# Patient Record
Sex: MALE | Race: WHITE | HISPANIC OR LATINO | Employment: OTHER | URBAN - METROPOLITAN AREA
[De-identification: names, ages, dates, MRNs, and addresses within clinical notes are randomized per-mention and may not be internally consistent; named-entity substitution may affect disease eponyms.]

---

## 2018-08-09 ENCOUNTER — HOSPITAL ENCOUNTER (EMERGENCY)
Facility: CLINIC | Age: 83
Discharge: HOME | End: 2018-08-09
Attending: EMERGENCY MEDICINE | Admitting: EMERGENCY MEDICINE
Payer: MEDICAID

## 2018-08-09 ENCOUNTER — APPOINTMENT (OUTPATIENT)
Dept: ULTRASOUND IMAGING | Facility: CLINIC | Age: 83
End: 2018-08-09
Attending: EMERGENCY MEDICINE
Payer: MEDICAID

## 2018-08-09 ENCOUNTER — APPOINTMENT (OUTPATIENT)
Dept: GENERAL RADIOLOGY | Facility: CLINIC | Age: 83
End: 2018-08-09
Attending: EMERGENCY MEDICINE
Payer: MEDICAID

## 2018-08-09 VITALS
RESPIRATION RATE: 18 BRPM | TEMPERATURE: 98.9 F | SYSTOLIC BLOOD PRESSURE: 119 MMHG | OXYGEN SATURATION: 97 % | DIASTOLIC BLOOD PRESSURE: 65 MMHG

## 2018-08-09 DIAGNOSIS — I50.9 CHRONIC CONGESTIVE HEART FAILURE, UNSPECIFIED CONGESTIVE HEART FAILURE TYPE: ICD-10-CM

## 2018-08-09 DIAGNOSIS — R79.1 SUBTHERAPEUTIC INTERNATIONAL NORMALIZED RATIO (INR): ICD-10-CM

## 2018-08-09 DIAGNOSIS — R60.0 PERIPHERAL EDEMA: ICD-10-CM

## 2018-08-09 LAB
ALBUMIN SERPL-MCNC: 2.6 G/DL (ref 3.4–5)
ALP SERPL-CCNC: 114 U/L (ref 40–150)
ALT SERPL W P-5'-P-CCNC: 20 U/L (ref 0–70)
ANION GAP SERPL CALCULATED.3IONS-SCNC: 5 MMOL/L (ref 3–14)
AST SERPL W P-5'-P-CCNC: 13 U/L (ref 0–45)
BASOPHILS # BLD AUTO: 0 10E9/L (ref 0–0.2)
BASOPHILS NFR BLD AUTO: 0.3 %
BILIRUB SERPL-MCNC: 0.1 MG/DL (ref 0.2–1.3)
BUN SERPL-MCNC: 13 MG/DL (ref 7–30)
CALCIUM SERPL-MCNC: 8 MG/DL (ref 8.5–10.1)
CHLORIDE SERPL-SCNC: 103 MMOL/L (ref 94–109)
CO2 SERPL-SCNC: 30 MMOL/L (ref 20–32)
CREAT SERPL-MCNC: 0.74 MG/DL (ref 0.66–1.25)
DIFFERENTIAL METHOD BLD: ABNORMAL
EOSINOPHIL # BLD AUTO: 0.1 10E9/L (ref 0–0.7)
EOSINOPHIL NFR BLD AUTO: 1.7 %
ERYTHROCYTE [DISTWIDTH] IN BLOOD BY AUTOMATED COUNT: 13.9 % (ref 10–15)
GFR SERPL CREATININE-BSD FRML MDRD: >90 ML/MIN/1.7M2
GLUCOSE SERPL-MCNC: 85 MG/DL (ref 70–99)
HCT VFR BLD AUTO: 32.5 % (ref 40–53)
HGB BLD-MCNC: 10.2 G/DL (ref 13.3–17.7)
IMM GRANULOCYTES # BLD: 0.1 10E9/L (ref 0–0.4)
IMM GRANULOCYTES NFR BLD: 1.8 %
INR PPP: 1.57 (ref 0.86–1.14)
LYMPHOCYTES # BLD AUTO: 1.5 10E9/L (ref 0.8–5.3)
LYMPHOCYTES NFR BLD AUTO: 18.8 %
MCH RBC QN AUTO: 30.2 PG (ref 26.5–33)
MCHC RBC AUTO-ENTMCNC: 31.4 G/DL (ref 31.5–36.5)
MCV RBC AUTO: 96 FL (ref 78–100)
MONOCYTES # BLD AUTO: 1.1 10E9/L (ref 0–1.3)
MONOCYTES NFR BLD AUTO: 14.3 %
NEUTROPHILS # BLD AUTO: 4.9 10E9/L (ref 1.6–8.3)
NEUTROPHILS NFR BLD AUTO: 63.1 %
NRBC # BLD AUTO: 0 10*3/UL
NRBC BLD AUTO-RTO: 0 /100
NT-PROBNP SERPL-MCNC: 1878 PG/ML (ref 0–1800)
PLATELET # BLD AUTO: 343 10E9/L (ref 150–450)
POTASSIUM SERPL-SCNC: 3.5 MMOL/L (ref 3.4–5.3)
PROT SERPL-MCNC: 6 G/DL (ref 6.8–8.8)
RBC # BLD AUTO: 3.38 10E12/L (ref 4.4–5.9)
SODIUM SERPL-SCNC: 138 MMOL/L (ref 133–144)
WBC # BLD AUTO: 7.8 10E9/L (ref 4–11)

## 2018-08-09 PROCEDURE — 99285 EMERGENCY DEPT VISIT HI MDM: CPT | Mod: 25

## 2018-08-09 PROCEDURE — 83880 ASSAY OF NATRIURETIC PEPTIDE: CPT | Performed by: EMERGENCY MEDICINE

## 2018-08-09 PROCEDURE — 96374 THER/PROPH/DIAG INJ IV PUSH: CPT

## 2018-08-09 PROCEDURE — 85025 COMPLETE CBC W/AUTO DIFF WBC: CPT | Performed by: EMERGENCY MEDICINE

## 2018-08-09 PROCEDURE — 93005 ELECTROCARDIOGRAM TRACING: CPT

## 2018-08-09 PROCEDURE — 71046 X-RAY EXAM CHEST 2 VIEWS: CPT

## 2018-08-09 PROCEDURE — 94640 AIRWAY INHALATION TREATMENT: CPT

## 2018-08-09 PROCEDURE — 93970 EXTREMITY STUDY: CPT

## 2018-08-09 PROCEDURE — 85610 PROTHROMBIN TIME: CPT | Performed by: EMERGENCY MEDICINE

## 2018-08-09 PROCEDURE — 25000128 H RX IP 250 OP 636: Performed by: EMERGENCY MEDICINE

## 2018-08-09 PROCEDURE — 80053 COMPREHEN METABOLIC PANEL: CPT | Performed by: EMERGENCY MEDICINE

## 2018-08-09 PROCEDURE — 25000125 ZZHC RX 250: Performed by: EMERGENCY MEDICINE

## 2018-08-09 RX ORDER — IPRATROPIUM BROMIDE AND ALBUTEROL SULFATE 2.5; .5 MG/3ML; MG/3ML
3 SOLUTION RESPIRATORY (INHALATION) ONCE
Status: COMPLETED | OUTPATIENT
Start: 2018-08-09 | End: 2018-08-09

## 2018-08-09 RX ORDER — FUROSEMIDE 10 MG/ML
40 INJECTION INTRAMUSCULAR; INTRAVENOUS ONCE
Status: COMPLETED | OUTPATIENT
Start: 2018-08-09 | End: 2018-08-09

## 2018-08-09 RX ADMIN — FUROSEMIDE 40 MG: 10 INJECTION, SOLUTION INTRAVENOUS at 20:22

## 2018-08-09 RX ADMIN — IPRATROPIUM BROMIDE AND ALBUTEROL SULFATE 3 ML: .5; 3 SOLUTION RESPIRATORY (INHALATION) at 18:03

## 2018-08-09 NOTE — ED AVS SNAPSHOT
[facility] Emergency Department    [address]    [facility] [address]    Phone:  [phone]    Fax:  [phone]                                       Thierry Love   MRN: [id number]    Department:  [facility] Emergency Department   Date of Visit:  8/9/2018           After Visit Summary Signature Page     I have received my discharge instructions, and my questions have been answered. I have discussed any challenges I see with this plan with the nurse or doctor.    ..........................................................................................................................................  Patient/Patient Representative Signature      ..........................................................................................................................................  Patient Representative Print Name and Relationship to Patient    ..................................................               ................................................  Date                                            Time    ..........................................................................................................................................  Reviewed by Signature/Title    ...................................................              ..............................................  Date                                                            Time

## 2018-08-09 NOTE — ED PROVIDER NOTES
History     Chief Complaint:  Fall and Leg Swelling      HPI   Thierry Love is a pleasant 83 year old male  with a history of CHF, COPD with a pacemaker, on Warfarin who presents to the emergency department with his daughter for evaluation of leg swelling. The patient came in [facility] where he lives exactly 1 week ago and has been complaining of right sided leg pain and swelling since the flight to [place]. The patient does have swollen legs at baseline but reports that this is worse than normal. He also notes pain in the right leg at the calf which is intermittent, non-radiating and aggravated by touch. The patient denies known history of blood clot. He takes warfarin daily. He denies any dyspnea or chest pain. He notes that he checks INR every month.     Allergies:  No Known Allergies     Medications:    Losartan  Atorvastatin  KCl  Furosemide  Warfarin  Omeprazole  Albuterol   Microzide  Levaquin     Past Medical History:    COPD  Hypertension     Past Surgical History:    Appendectomy  Cardiac surgery    Family History:    No family history on file.    Social History:  The patient  reports that he has quit smoking. He has never used smokeless tobacco. He reports that he drinks alcohol. He reports that he does not use illicit drugs.   Marital Status:   [2]     Review of Systems   HENT: Negative.    Cardiovascular: Positive for leg swelling.   All other systems reviewed and are negative.    Physical Exam   First Vitals:  BP: 119/68  Heart Rate: 70  Temp: 98.9  F (37.2  C)  Resp: 18  SpO2: 96 %      Physical Exam    General:   Pleasant, age appropriate male resting comfortably.  HEENT:    Oropharynx is moist, without lesions or trismus.  Eyes:    Conjunctiva normal  Neck:    Supple, no meningismus.     CV:     Regular rate and rhythm.      No murmurs, rubs or gallops.       No unilateral leg swelling.       2+ radial pulses bilateral.       2-3+ bilateral lower extremity edema.  PULM:    Trace late  expiratory wheezing     No respiratory distress.      Good air exchange.     No rales     No stridor.  ABD:    Soft, non-tender, non-distended.       No pulsatile masses.       No rebound, guarding or rigidity.  MSK:     No gross deformity to all four extremities.   LYMPH:   No cervical lymphadenopathy.  NEURO:   Alert. Good muscle tone, no atrophy.  Skin:    Warm, dry     Superficial ulceration to the right lower leg and left anterior knee  Psych:    Mood is good and affect is appropriate.        Emergency Department Course   ECG (17:56:01):  Rate 69 bpm. NJ interval *. QRS duration 134. QT/QTc 418/447. P-R-T axes * -47 6. Atrial fibrillation. Left axis deviation. Right bundle branch block. Abnormal EKG. Interpreted at 1758 by Juni Pulido MD.       Imaging:  Chest XR,  PA & LAT   Preliminary Result   IMPRESSION: A single lead pacemaker module implanted over the right   chest with the lead in the right ventricle is again noted without   identifiable change.      Severe cardiomegaly again noted. The lungs are clear. There is no   pleural effusion or pneumothorax. There is no evidence for pulmonary   edema. No evidence for rib fracture on these two views.      US Lower Extremity Venous Duplex Bilateral   Final Result   IMPRESSION: No DVT demonstrated.      YAMEL CUADRA MD             Laboratory:  CBC: RBC 3.38L, HGB 10.2L, *HCT 32.5L, otherwise within normal limits   CMP: Calcium 8.0L, Bilirubin Total 0.1L, Albumin 2.6L, Protein Total 6.0L, otherwise within normal limits  BNP: 1878H  INR: 1.57h    Interventions:  1803: Duo neb 3mL.  2022: Lasix 40mg IV    Emergency Department Course:  Past medical records, nursing notes, and vitals reviewed.  1722: I performed an exam of the patient and obtained history, as documented above.       IV inserted and blood drawn for the above work up to be conducted.     The patient was sent for imaging studies while in the emergency department, findings above.    Rechecked the  patient, findings and plan explained to the patient. Patient discharged home, status improved, with instructions regarding supportive care, medications, and reasons to return as well as the importance of close follow-up was reviewed.    Impression & Plan    Medical Decision Makin-year-old male with history of CHF presented to the ED with his daughter for progressively worsening bilateral lower extremity edema.  He has no evidence of developing liver or renal failure.  Doppler ultrasound done to rule out DVT.  He does have a history of CHF in which he has no evidence of pulmonary edema on chest x-ray.  Peripheral edema is clearly related to his CHF and possible some degree of lymphedema.  Patient is currently on make maintained on 40 mg of Lasix twice a day.  Patient was given additional additional dose of IV Lasix.  He will take 80 mg of Lasix the next 2 mornings and 40 mg at night.  He will be instructed to closely follow-up his primary care physician in the next 2-4 days for recheck of his peripheral edema as well as renal function and electrolytes.  He was incidentally noted to have a subtherapeutic INR.  Patient will take 5 mg of warfarin over the next 3 days then may resume his normal schedule.  Again close follow-up PCP for INR recheck.  Patient safe for discharge home.    Critical Care time:  none    Diagnosis:    ICD-10-CM    1. Chronic congestive heart failure, unspecified congestive heart failure type (H) I50.9    2. Peripheral edema R60.9    3. Subtherapeutic international normalized ratio (INR) R79.1        Disposition:  discharged to home    Jennifer SUN am serving as a scribe at 5:22 PM on 2018 to document services personally performed by Juni Pulido MD based on my observations and the provider's statements to me.    [facility] EMERGENCY DEPARTMENT       Juni Pulido MD  [phone]

## 2018-08-09 NOTE — ED AVS SNAPSHOT
[facility] Emergency Department    [address] [place] [address]    Phone:  [phone]    Fax:  [phone]                                       Thierry Love   MRN: [id number]    Department:  [facility] Emergency Department   Date of Visit:  8/9/2018           Patient Information     Date Of Birth          1935        Your diagnoses for this visit were:     Chronic congestive heart failure, unspecified congestive heart failure type (H)     Peripheral edema     Subtherapeutic international normalized ratio (INR)        You were seen by Juni Pulido MD.        Discharge Instructions       Please take an extra dose of furosemide the next 2 mornings.  You will take 2 tablets of the furosemide on Friday and Saturday morning and continue to take 1 tablet at night then resume your normal schedule.    Your INR was low today at 1.57.  Please take the full dose of 5 mg for the next 3 days.    Please make an appointment to follow up with [facility] [phone] in 2-4 days even if entirely better.        Discharge References/Attachments     HEART FAILURE, WHAT IS (Frisian)    LEG SWELLING IN BOTH LEGS (Frisian)      24 Hour Appointment Hotline       To make an appointment at any [facility], call [phone] ([phone]). If you don't have a family doctor or clinic, we will help you find one. [facility] are conveniently located to serve the needs of you and your family.             Review of your medicines      Our records show that you are taking the medicines listed below. If these are incorrect, please call your family doctor or clinic.        Dose / Directions Last dose taken    albuterol 108 (90 Base) MCG/ACT Inhaler   Commonly known as:  PROAIR HFA/PROVENTIL HFA/VENTOLIN HFA   Dose:  2 puff   Quantity:  1 Inhaler        Inhale 2 puffs into the lungs every 6 hours as needed for shortness of breath / dyspnea   Refills:  0         ALBUTEROL IN   Dose:  2 puff        Inhale 2 puffs into the lungs every 6 hours as needed   Refills:  0        ENALAPRIL MALEATE PO   Dose:  10 mg        Take 10 mg by mouth   Refills:  0        FLUTICASONE-SALMETEROL IN        Refills:  0        FUROSEMIDE PO   Dose:  40 mg        Take 40 mg by mouth   Refills:  0        hydrochlorothiazide 12.5 MG capsule   Commonly known as:  MICROZIDE   Dose:  12.5 mg        Take 12.5 mg by mouth daily   Refills:  0        levofloxacin 750 MG tablet   Commonly known as:  LEVAQUIN   Dose:  750 mg   Quantity:  6 tablet        Take 1 tablet (750 mg) by mouth daily   Refills:  0        LIPITOR PO   Dose:  10 mg        Take 10 mg by mouth   Refills:  0        OMEPRAZOLE PO   Dose:  20 mg        Take 20 mg by mouth   Refills:  0        tiotropium 18 MCG capsule   Commonly known as:  SPIRIVA   Dose:  18 mcg        Inhale 18 mcg into the lungs daily   Refills:  0        WARFARIN SODIUM   Dose:  5 mg        5 mg daily   Refills:  0                Procedures and tests performed during your visit     BNP    CBC + differential    Cardiac Continuous Monitoring    Chest XR,  PA & LAT    Comprehensive metabolic panel    EKG 12 lead    INR    Peripheral IV catheter    US Lower Extremity Venous Duplex Bilateral      Orders Needing Specimen Collection     None      Pending Results     Date and Time Order Name Status Description    8/9/2018 1732 Chest XR,  PA & LAT Preliminary     8/9/2018 1732 EKG 12 lead Preliminary             Pending Culture Results     No orders found from 8/7/2018 to 8/10/2018.            Pending Results Instructions     If you had any lab results that were not finalized at the time of your Discharge, you can call the ED Lab Result RN at [phone]. You will be contacted by this team for any positive Lab results or changes in treatment. The nurses are available 7 days a week from 10A to 6:30P.  You can leave a message 24 hours per day and they will return your call.         Test Results From Your Hospital Stay        8/9/2018  6:12 PM      Component Results     Component Value Ref Range & Units Status    WBC 7.8 4.0 - 11.0 10e9/L Final    RBC Count 3.38 (L) 4.4 - 5.9 10e12/L Final    Hemoglobin 10.2 (L) 13.3 - 17.7 g/dL Final    Hematocrit 32.5 (L) 40.0 - 53.0 % Final    MCV 96 78 - 100 fl Final    MCH 30.2 26.5 - 33.0 pg Final    MCHC 31.4 (L) 31.5 - 36.5 g/dL Final    RDW 13.9 10.0 - 15.0 % Final    Platelet Count 343 150 - 450 10e9/L Final    Diff Method Automated Method  Final    % Neutrophils 63.1 % Final    % Lymphocytes 18.8 % Final    % Monocytes 14.3 % Final    % Eosinophils 1.7 % Final    % Basophils 0.3 % Final    % Immature Granulocytes 1.8 % Final    Nucleated RBCs 0 0 /100 Final    Absolute Neutrophil 4.9 1.6 - 8.3 10e9/L Final    Absolute Lymphocytes 1.5 0.8 - 5.3 10e9/L Final    Absolute Monocytes 1.1 0.0 - 1.3 10e9/L Final    Absolute Eosinophils 0.1 0.0 - 0.7 10e9/L Final    Absolute Basophils 0.0 0.0 - 0.2 10e9/L Final    Abs Immature Granulocytes 0.1 0 - 0.4 10e9/L Final    Absolute Nucleated RBC 0.0  Final         8/9/2018  6:30 PM      Component Results     Component Value Ref Range & Units Status    Sodium 138 133 - 144 mmol/L Final    Potassium 3.5 3.4 - 5.3 mmol/L Final    Chloride 103 94 - 109 mmol/L Final    Carbon Dioxide 30 20 - 32 mmol/L Final    Anion Gap 5 3 - 14 mmol/L Final    Glucose 85 70 - 99 mg/dL Final    Urea Nitrogen 13 7 - 30 mg/dL Final    Creatinine 0.74 0.66 - 1.25 mg/dL Final    GFR Estimate >90 >60 mL/min/1.7m2 Final    Non  GFR Calc    GFR Estimate If Black >90 >60 mL/min/1.7m2 Final    African American GFR Calc    Calcium 8.0 (L) 8.5 - 10.1 mg/dL Final    Bilirubin Total 0.1 (L) 0.2 - 1.3 mg/dL Final    Albumin 2.6 (L) 3.4 - 5.0 g/dL Final    Protein Total 6.0 (L) 6.8 - 8.8 g/dL Final    Alkaline Phosphatase 114 40 - 150 U/L Final    ALT 20 0 - 70 U/L Final    AST 13 0 - 45 U/L Final         8/9/2018  6:30 PM       Component Results     Component Value Ref Range & Units Status    N-Terminal Pro BNP Inpatient 1878 (H) 0 - 1800 pg/mL Final       Reference range shown and results flagged as abnormal are suggested inpatient   cut points for confirming diagnosis if CHF in an acute setting. Establishing a   baseline value for each individual patient is useful for follow-up. An   inpatient or emergency department NT-proPBNP <300 pg/mL effectively rules out   acute CHF, with 99% negative predictive value.  The outpatient non-acute reference range for ruling out CHF is:   0-125 pg/mL (age 18 to less than 75)   0-450 pg/mL (age 75 yrs and older)           8/9/2018  6:29 PM      Component Results     Component Value Ref Range & Units Status    INR 1.57 (H) 0.86 - 1.14 Final         8/9/2018  7:47 PM      Narrative     CHEST TWO VIEWS  8/9/2018 7:08 PM     COMPARISON: Two view chest x-ray 8/15/2013    HISTORY: Fall, left rib pain; dyspnea with history of congestive heart  failure.         Impression     IMPRESSION: A single lead pacemaker module implanted over the right  chest with the lead in the right ventricle is again noted without  identifiable change.    Severe cardiomegaly again noted. The lungs are clear. There is no  pleural effusion or pneumothorax. There is no evidence for pulmonary  edema. No evidence for rib fracture on these two views.         8/9/2018  7:10 PM      Narrative     ULTRASOUND VENOUS LOWER EXTREMITY BILATERAL 8/9/2018 7:05 PM     HISTORY: bilateral leg swelling, bilateral leg swelling;     COMPARISON: None.    TECHNIQUE: Ultrasound gray scale, Color Doppler flow, and spectral  Doppler waveform analysis performed.    FINDINGS: The bilateral common femoral, superficial femoral, popliteal  and posterior tibial veins are patent and fully compressible and  demonstrate normal venous Doppler flow.        Impression     IMPRESSION: No DVT demonstrated.    YAMEL CUADRA MD                Clinical Quality Measure: Blood  Pressure Screening     Your blood pressure was checked while you were in the emergency department today. The last reading we obtained was  BP: 114/59 . Please read the guidelines below about what these numbers mean and what you should do about them.  If your systolic blood pressure (the top number) is less than 120 and your diastolic blood pressure (the bottom number) is less than 80, then your blood pressure is normal. There is nothing more that you need to do about it.  If your systolic blood pressure (the top number) is 120-139 or your diastolic blood pressure (the bottom number) is 80-89, your blood pressure may be higher than it should be. You should have your blood pressure rechecked within a year by a primary care provider.  If your systolic blood pressure (the top number) is 140 or greater or your diastolic blood pressure (the bottom number) is 90 or greater, you may have high blood pressure. High blood pressure is treatable, but if left untreated over time it can put you at risk for heart attack, stroke, or kidney failure. You should have your blood pressure rechecked by a primary care provider within the next 4 weeks.  If your provider in the emergency department today gave you specific instructions to follow-up with your doctor or provider even sooner than that, you should follow that instruction and not wait for up to 4 weeks for your follow-up visit.        Thank you for choosing [facility]       Thank you for choosing [facility] for your care. Our goal is always to provide you with excellent care. Hearing back from our patients is one way we can continue to improve our services. Please take a few minutes to complete the written survey that you may receive in the mail after you visit with us. Thank you!        [employer] Information     [employer] lets you send messages to your doctor, view your test results, renew your prescriptions, schedule appointments and more. To sign up, go to [email or web address]  "Click on \"Log in\" on the left side of the screen, which will take you to the Welcome page. Then click on \"Sign up Now\" on the right side of the page.     You will be asked to enter the access code listed below, as well as some personal information. Please follow the directions to create your username and password.     Your access code is: [email or web address]  Expires: 2018  8:52 PM     Your access code will  in 90 days. If you need help or a new code, please call your [facility] clinic or [phone].        Care EveryWhere ID     This is your Care EveryWhere ID. This could be used by other organizations to access your [facility] medical records  [id number]        Equal Access to Services     JERRY HAWKINS : Bang Dupree, wajusta herrera, gary kaalpj heaton, shabbir kruger. So [facility] [place] [facility] [phone].    ATENCIÓN: Si habla español, tiene a garner disposición servicios gratuitos de asistencia lingüística. Llame al [phone].    We comply with applicable federal civil rights laws and [place] laws. We do not discriminate on the basis of race, color, national origin, age, disability, sex, sexual orientation, or gender identity.            After Visit Summary       This is your record. Keep this with you and show to your community pharmacist(s) and doctor(s) at your next visit.                  "

## 2018-08-10 LAB — INTERPRETATION ECG - MUSE: NORMAL

## 2018-08-10 NOTE — DISCHARGE INSTRUCTIONS
Please take an extra dose of furosemide the next 2 mornings.  You will take 2 tablets of the furosemide on Friday and Saturday morning and continue to take 1 tablet at night then resume your normal schedule.    Your INR was low today at 1.57.  Please take the full dose of 5 mg for the next 3 days.    Please make an appointment to follow up with [facility] [phone] in 2-4 days even if entirely better.

## 2018-08-10 NOTE — ED NOTES
Dr. Pulido requested AMBROCIO hose. However, the daughter is concerned about this plan as the patient does not tolerate even normal socks and especially removal of socks is very uncomfortable. Discussed this with Dr. Pulido who indicates to not do the TEDS because of this. Open wounds on the legs to be dressed by ERT.

## 2018-09-25 ENCOUNTER — AMBULATORY - HEALTHEAST (OUTPATIENT)
Dept: VASCULAR SURGERY | Facility: CLINIC | Age: 83
End: 2018-09-25

## 2018-09-25 DIAGNOSIS — I87.2 DERMATITIS, STASIS: ICD-10-CM

## 2018-09-27 ENCOUNTER — HOSPITAL ENCOUNTER (INPATIENT)
Facility: CLINIC | Age: 83
LOS: 3 days | Discharge: HOME | End: 2018-09-30
Attending: EMERGENCY MEDICINE | Admitting: INTERNAL MEDICINE
Payer: MEDICAID

## 2018-09-27 ENCOUNTER — APPOINTMENT (OUTPATIENT)
Dept: GENERAL RADIOLOGY | Facility: CLINIC | Age: 83
End: 2018-09-27
Attending: EMERGENCY MEDICINE
Payer: MEDICAID

## 2018-09-27 DIAGNOSIS — M62.81 GENERALIZED MUSCLE WEAKNESS: ICD-10-CM

## 2018-09-27 DIAGNOSIS — J18.9 PNEUMONIA OF BOTH LOWER LOBES DUE TO INFECTIOUS ORGANISM: ICD-10-CM

## 2018-09-27 DIAGNOSIS — Z78.9 FAILURE OF OUTPATIENT TREATMENT: ICD-10-CM

## 2018-09-27 DIAGNOSIS — J44.1 COPD EXACERBATION (H): ICD-10-CM

## 2018-09-27 DIAGNOSIS — I50.33 ACUTE ON CHRONIC DIASTOLIC CONGESTIVE HEART FAILURE (H): Primary | ICD-10-CM

## 2018-09-27 DIAGNOSIS — R06.02 SOB (SHORTNESS OF BREATH): ICD-10-CM

## 2018-09-27 DIAGNOSIS — R05.9 COUGH: ICD-10-CM

## 2018-09-27 DIAGNOSIS — H04.123 DRY EYES: ICD-10-CM

## 2018-09-27 LAB
ANION GAP SERPL CALCULATED.3IONS-SCNC: 6 MMOL/L (ref 3–14)
BASOPHILS # BLD AUTO: 0 10E9/L (ref 0–0.2)
BASOPHILS NFR BLD AUTO: 0.3 %
BUN SERPL-MCNC: 14 MG/DL (ref 7–30)
CALCIUM SERPL-MCNC: 8 MG/DL (ref 8.5–10.1)
CHLORIDE SERPL-SCNC: 106 MMOL/L (ref 94–109)
CO2 SERPL-SCNC: 26 MMOL/L (ref 20–32)
CREAT SERPL-MCNC: 0.76 MG/DL (ref 0.66–1.25)
DIFFERENTIAL METHOD BLD: ABNORMAL
EOSINOPHIL # BLD AUTO: 0.3 10E9/L (ref 0–0.7)
EOSINOPHIL NFR BLD AUTO: 4.7 %
ERYTHROCYTE [DISTWIDTH] IN BLOOD BY AUTOMATED COUNT: 14.2 % (ref 10–15)
GFR SERPL CREATININE-BSD FRML MDRD: >90 ML/MIN/1.7M2
GLUCOSE SERPL-MCNC: 131 MG/DL (ref 70–99)
HCT VFR BLD AUTO: 37.2 % (ref 40–53)
HGB BLD-MCNC: 11.5 G/DL (ref 13.3–17.7)
IMM GRANULOCYTES # BLD: 0 10E9/L (ref 0–0.4)
IMM GRANULOCYTES NFR BLD: 0.1 %
INR PPP: 2.07 (ref 0.86–1.14)
LYMPHOCYTES # BLD AUTO: 0.9 10E9/L (ref 0.8–5.3)
LYMPHOCYTES NFR BLD AUTO: 13.1 %
MCH RBC QN AUTO: 29.6 PG (ref 26.5–33)
MCHC RBC AUTO-ENTMCNC: 30.9 G/DL (ref 31.5–36.5)
MCV RBC AUTO: 96 FL (ref 78–100)
MONOCYTES # BLD AUTO: 1 10E9/L (ref 0–1.3)
MONOCYTES NFR BLD AUTO: 13.8 %
NEUTROPHILS # BLD AUTO: 4.9 10E9/L (ref 1.6–8.3)
NEUTROPHILS NFR BLD AUTO: 68 %
NRBC # BLD AUTO: 0 10*3/UL
NRBC BLD AUTO-RTO: 0 /100
NT-PROBNP SERPL-MCNC: 1421 PG/ML (ref 0–1800)
PLATELET # BLD AUTO: 230 10E9/L (ref 150–450)
POTASSIUM SERPL-SCNC: 4.3 MMOL/L (ref 3.4–5.3)
RBC # BLD AUTO: 3.89 10E12/L (ref 4.4–5.9)
SODIUM SERPL-SCNC: 138 MMOL/L (ref 133–144)
TROPONIN I SERPL-MCNC: <0.015 UG/L (ref 0–0.04)
WBC # BLD AUTO: 7.2 10E9/L (ref 4–11)

## 2018-09-27 PROCEDURE — 94640 AIRWAY INHALATION TREATMENT: CPT | Mod: 76

## 2018-09-27 PROCEDURE — 85610 PROTHROMBIN TIME: CPT | Performed by: EMERGENCY MEDICINE

## 2018-09-27 PROCEDURE — 99223 1ST HOSP IP/OBS HIGH 75: CPT | Mod: AI | Performed by: INTERNAL MEDICINE

## 2018-09-27 PROCEDURE — 96365 THER/PROPH/DIAG IV INF INIT: CPT

## 2018-09-27 PROCEDURE — 25000128 H RX IP 250 OP 636: Performed by: INTERNAL MEDICINE

## 2018-09-27 PROCEDURE — 40000275 ZZH STATISTIC RCP TIME EA 10 MIN

## 2018-09-27 PROCEDURE — 25000128 H RX IP 250 OP 636: Performed by: EMERGENCY MEDICINE

## 2018-09-27 PROCEDURE — 93005 ELECTROCARDIOGRAM TRACING: CPT

## 2018-09-27 PROCEDURE — 12000007 ZZH R&B INTERMEDIATE

## 2018-09-27 PROCEDURE — 25000125 ZZHC RX 250: Performed by: EMERGENCY MEDICINE

## 2018-09-27 PROCEDURE — 25000132 ZZH RX MED GY IP 250 OP 250 PS 637: Performed by: INTERNAL MEDICINE

## 2018-09-27 PROCEDURE — 85025 COMPLETE CBC W/AUTO DIFF WBC: CPT | Performed by: EMERGENCY MEDICINE

## 2018-09-27 PROCEDURE — 94640 AIRWAY INHALATION TREATMENT: CPT

## 2018-09-27 PROCEDURE — 25000125 ZZHC RX 250: Performed by: INTERNAL MEDICINE

## 2018-09-27 PROCEDURE — 84484 ASSAY OF TROPONIN QUANT: CPT | Performed by: EMERGENCY MEDICINE

## 2018-09-27 PROCEDURE — 99285 EMERGENCY DEPT VISIT HI MDM: CPT | Mod: 25

## 2018-09-27 PROCEDURE — 40000274 ZZH STATISTIC RCP CONSULT EA 30 MIN

## 2018-09-27 PROCEDURE — 83880 ASSAY OF NATRIURETIC PEPTIDE: CPT | Performed by: EMERGENCY MEDICINE

## 2018-09-27 PROCEDURE — 71046 X-RAY EXAM CHEST 2 VIEWS: CPT

## 2018-09-27 PROCEDURE — 80048 BASIC METABOLIC PNL TOTAL CA: CPT | Performed by: EMERGENCY MEDICINE

## 2018-09-27 PROCEDURE — 99207 ZZC CDG-MDM COMPONENT: MEETS MODERATE - UP CODED: CPT | Performed by: INTERNAL MEDICINE

## 2018-09-27 RX ORDER — AZITHROMYCIN 250 MG/1
TABLET, FILM COATED ORAL
Status: ON HOLD | COMMUNITY
Start: 2018-09-26 | End: 2018-09-30

## 2018-09-27 RX ORDER — ACETAMINOPHEN 325 MG/1
650 TABLET ORAL EVERY 4 HOURS PRN
Status: DISCONTINUED | OUTPATIENT
Start: 2018-09-27 | End: 2018-09-30 | Stop reason: HOSPADM

## 2018-09-27 RX ORDER — IPRATROPIUM BROMIDE AND ALBUTEROL SULFATE 2.5; .5 MG/3ML; MG/3ML
3 SOLUTION RESPIRATORY (INHALATION)
Status: DISCONTINUED | OUTPATIENT
Start: 2018-09-27 | End: 2018-09-28

## 2018-09-27 RX ORDER — DOCUSATE SODIUM 100 MG/1
100 CAPSULE, LIQUID FILLED ORAL 2 TIMES DAILY PRN
Status: DISCONTINUED | OUTPATIENT
Start: 2018-09-27 | End: 2018-09-30 | Stop reason: HOSPADM

## 2018-09-27 RX ORDER — WARFARIN SODIUM 5 MG/1
5 TABLET ORAL
Status: ON HOLD | COMMUNITY
End: 2021-05-26

## 2018-09-27 RX ORDER — POTASSIUM CL/LIDO/0.9 % NACL 10MEQ/0.1L
10 INTRAVENOUS SOLUTION, PIGGYBACK (ML) INTRAVENOUS
Status: DISCONTINUED | OUTPATIENT
Start: 2018-09-27 | End: 2018-09-30 | Stop reason: HOSPADM

## 2018-09-27 RX ORDER — ALBUTEROL SULFATE 0.83 MG/ML
2.5 SOLUTION RESPIRATORY (INHALATION)
Status: COMPLETED | OUTPATIENT
Start: 2018-09-27 | End: 2018-09-27

## 2018-09-27 RX ORDER — ONDANSETRON 4 MG/1
4 TABLET, ORALLY DISINTEGRATING ORAL EVERY 6 HOURS PRN
Status: DISCONTINUED | OUTPATIENT
Start: 2018-09-27 | End: 2018-09-30 | Stop reason: HOSPADM

## 2018-09-27 RX ORDER — WARFARIN SODIUM 5 MG/1
5 TABLET ORAL
Status: DISCONTINUED | OUTPATIENT
Start: 2018-09-27 | End: 2018-09-27

## 2018-09-27 RX ORDER — LOSARTAN POTASSIUM 25 MG/1
25 TABLET ORAL DAILY
COMMUNITY
End: 2021-08-30

## 2018-09-27 RX ORDER — NALOXONE HYDROCHLORIDE 0.4 MG/ML
.1-.4 INJECTION, SOLUTION INTRAMUSCULAR; INTRAVENOUS; SUBCUTANEOUS
Status: DISCONTINUED | OUTPATIENT
Start: 2018-09-27 | End: 2018-09-30 | Stop reason: HOSPADM

## 2018-09-27 RX ORDER — FUROSEMIDE 10 MG/ML
40 INJECTION INTRAMUSCULAR; INTRAVENOUS EVERY 12 HOURS
Status: DISCONTINUED | OUTPATIENT
Start: 2018-09-27 | End: 2018-09-29

## 2018-09-27 RX ORDER — ALBUTEROL SULFATE 0.83 MG/ML
2.5 SOLUTION RESPIRATORY (INHALATION)
Status: DISCONTINUED | OUTPATIENT
Start: 2018-09-27 | End: 2018-09-30 | Stop reason: HOSPADM

## 2018-09-27 RX ORDER — WARFARIN SODIUM 2.5 MG/1
2.5 TABLET ORAL
Status: COMPLETED | OUTPATIENT
Start: 2018-09-27 | End: 2018-09-27

## 2018-09-27 RX ORDER — POTASSIUM CHLORIDE 1500 MG/1
20-40 TABLET, EXTENDED RELEASE ORAL
Status: DISCONTINUED | OUTPATIENT
Start: 2018-09-27 | End: 2018-09-30 | Stop reason: HOSPADM

## 2018-09-27 RX ORDER — POTASSIUM CHLORIDE 29.8 MG/ML
20 INJECTION INTRAVENOUS
Status: DISCONTINUED | OUTPATIENT
Start: 2018-09-27 | End: 2018-09-30 | Stop reason: HOSPADM

## 2018-09-27 RX ORDER — ONDANSETRON 2 MG/ML
4 INJECTION INTRAMUSCULAR; INTRAVENOUS EVERY 6 HOURS PRN
Status: DISCONTINUED | OUTPATIENT
Start: 2018-09-27 | End: 2018-09-30 | Stop reason: HOSPADM

## 2018-09-27 RX ORDER — WARFARIN SODIUM 2.5 MG/1
2.5-5 TABLET ORAL DAILY
Status: ON HOLD | COMMUNITY
End: 2021-05-28

## 2018-09-27 RX ORDER — POTASSIUM CHLORIDE 7.45 MG/ML
10 INJECTION INTRAVENOUS
Status: DISCONTINUED | OUTPATIENT
Start: 2018-09-27 | End: 2018-09-30 | Stop reason: HOSPADM

## 2018-09-27 RX ORDER — CEFTRIAXONE 2 G/1
2 INJECTION, POWDER, FOR SOLUTION INTRAMUSCULAR; INTRAVENOUS ONCE
Status: COMPLETED | OUTPATIENT
Start: 2018-09-27 | End: 2018-09-27

## 2018-09-27 RX ORDER — ALBUTEROL SULFATE 0.83 MG/ML
2.5 SOLUTION RESPIRATORY (INHALATION) EVERY 6 HOURS PRN
Status: ON HOLD | COMMUNITY
End: 2021-05-26

## 2018-09-27 RX ORDER — AZITHROMYCIN 250 MG/1
250 TABLET, FILM COATED ORAL DAILY
Status: COMPLETED | OUTPATIENT
Start: 2018-09-28 | End: 2018-09-30

## 2018-09-27 RX ORDER — CEFTRIAXONE 1 G/1
1 INJECTION, POWDER, FOR SOLUTION INTRAMUSCULAR; INTRAVENOUS EVERY 24 HOURS
Status: DISCONTINUED | OUTPATIENT
Start: 2018-09-28 | End: 2018-09-30 | Stop reason: HOSPADM

## 2018-09-27 RX ORDER — POTASSIUM CHLORIDE 1.5 G/1.58G
20-40 POWDER, FOR SOLUTION ORAL
Status: DISCONTINUED | OUTPATIENT
Start: 2018-09-27 | End: 2018-09-30 | Stop reason: HOSPADM

## 2018-09-27 RX ADMIN — ALBUTEROL SULFATE 2.5 MG: 2.5 SOLUTION RESPIRATORY (INHALATION) at 12:52

## 2018-09-27 RX ADMIN — WARFARIN SODIUM 2.5 MG: 2.5 TABLET ORAL at 17:24

## 2018-09-27 RX ADMIN — IPRATROPIUM BROMIDE AND ALBUTEROL SULFATE 3 ML: .5; 3 SOLUTION RESPIRATORY (INHALATION) at 19:14

## 2018-09-27 RX ADMIN — CEFTRIAXONE SODIUM 2 G: 2 INJECTION, POWDER, FOR SOLUTION INTRAMUSCULAR; INTRAVENOUS at 13:28

## 2018-09-27 RX ADMIN — ALBUTEROL SULFATE 2.5 MG: 2.5 SOLUTION RESPIRATORY (INHALATION) at 12:47

## 2018-09-27 RX ADMIN — FUROSEMIDE 40 MG: 10 INJECTION, SOLUTION INTRAVENOUS at 17:24

## 2018-09-27 RX ADMIN — ALBUTEROL SULFATE 2.5 MG: 2.5 SOLUTION RESPIRATORY (INHALATION) at 11:17

## 2018-09-27 RX ADMIN — IPRATROPIUM BROMIDE AND ALBUTEROL SULFATE 3 ML: .5; 3 SOLUTION RESPIRATORY (INHALATION) at 17:30

## 2018-09-27 ASSESSMENT — ENCOUNTER SYMPTOMS
COUGH: 0
UNEXPECTED WEIGHT CHANGE: 0
DIARRHEA: 0
FEVER: 0
ABDOMINAL PAIN: 0
VOMITING: 0

## 2018-09-27 ASSESSMENT — ACTIVITIES OF DAILY LIVING (ADL): ADLS_ACUITY_SCORE: 16

## 2018-09-27 NOTE — LETTER
Transition Communication Hand-off for Care Transitions to Next Level of Care Provider    Hand-off for Care Transitions to Next Level of Care Provider  Name: Thierry Samuel  : 1935  MRN #: [id number]  Reason for Hospitalization:  Cough [R05]  SOB (shortness of breath) [R06.02]  Generalized muscle weakness [M62.81]  COPD exacerbation (H) [J44.1]  Failure of outpatient treatment [Z78.9]  Pneumonia of both lower lobes due to infectious organism [J18.9]  Admit Date/Time: 2018 10:29 AM  Discharge Date: 2018    Reason for Communication Hand-off Referral: Admission diagnoses: CHF    Discharge Plan:  Discharged to: Home with support                   Patient agreeable to post-hospital support suggestions:  Yes    Patient is on new medications:   Yes    MTM follow up recommended: No    Tel-Assurance program:  Already recommended a TA program    Follow-up specialty is recommended: Yes.  Follow up with [facility] as scheduled. Also, follow up with [facility] enrollment as scheduled.     Follow-up plan:  Future Appointments  Date Time Provider Department Center   10/5/2018 7:30 AM [facility]   10/5/2018 8:10 AM [facility]   10/24/2018 8:15 AM RU LAB RUL[facility]   10/24/2018 9:45 AM Deion Huffman MD [facility]     Any outstanding tests or procedures:  No. Recommend a repeat INR in 2-3 days.       Key Recommendations:  Pt admits to indiscretions with low sodium diet. Discussed benefits of fresh/frozen vs prepackaged items. He has a working scale so educated pt/dtr on daily weights and what to watch for if retaining fluids. He would benefit in reinforcement of participating at [facility].     Communicated handoff via [employer] to [facility]'s CC at [phone].     Malu Mix, RN, BSN, CTS  [facility]  [phone]    AVS/Discharge Summary is the source of truth; this is a  helpful guide for improved communication of patient story

## 2018-09-27 NOTE — ED PROVIDER NOTES
History     Chief Complaint:  Shortness of Breath      HPI   History is provided by the patient's daughter who is present at the bed side. The patient does not speak any English but his daughter translates for the patient.     Thierry Love is a pleasant 83 year old male  with a history of CHF, COPD with a pacemaker, on Warfarin who presents to the emergency department with his daughter for evaluation of shortness of breath and a productive cough with blood in the sputum. The patient started to notice blood in his sputum on Monday, today being Thursday, and went in to have his INR checked the following day. The daughter reports that this came back therapeutic at 3.0 . The patient was advised to eat more vegetables and fruit in an attempt to bring this number down. The patient's warfarin has been held since yesterday by his family because they are concerned about this bleeding. He has not taken his Warfarin yesterday and today but bleeding has continued. Additionally the patient has been more weak than his baseline. He has not had any new weight changes. Yesterday, the patient had X rays and his blood was checked both of which came back without any significant acute findings (per his daughter). He denies fevers, abdominal pain, nausea, vomiting or diarrhea. The patient has not had any weight changes. Of note: The patient sleeps slightly elevated due to this shortness of breath last night. The patient has been using his inhalers .      Allergies:  No Known Allergies      Medications:    Albuterol   Lipitor PO  Enalapril Maleate PO  Fluticasone-Salmeterol In  Furosemide PO  Microzide  Levaquin  Omeprazole PO  Spiriva  Warfarin Sodium     Past Medical History:    COPD  CHF  Chronic leg swelling  Hypertension      Past Surgical History:    Appendectomy  Cardiac surgery     Family History:    No family history on file.       Social History:  The patient  reports that he quit smoking. He has never used smokeless tobacco.  He reports that he drinks alcohol. He reports that he does not use illicit drugs.   Marital Status:   [2]      Review of Systems   Constitutional: Negative for fever and unexpected weight change.   Respiratory: Negative for cough (blood in sputum).    Gastrointestinal: Negative for abdominal pain, diarrhea and vomiting.   All other systems reviewed and are negative.      Physical Exam   First Vitals:  BP: 129/60  Pulse: 65  Heart Rate: 63  Temp: 98.4  F (36.9  C)  Resp: 22  SpO2: 96 %      Physical Exam  General: Resting on the bed.  Head: No obvious trauma to head.  Ears, Nose, Throat:  External ears normal.  Nose normal.    Eyes:  Conjunctivae clear.  Pupils are equal, round, and reactive.   Neck: Normal range of motion.  Neck supple.   CV: Regular rate and rhythm.  No murmurs.      Respiratory: Effort normal with diffuse bilateral wheezing  Gastrointestinal: Soft.  No distension. There is no tenderness.    Musculoskeletal: bilateral pitting edema, non tender  Neuro: Alert. Moving all extremities appropriately.  Normal speech.    Skin: Skin is warm and dry.  No rash noted.   Emergency Department Course   ECG (10:54:38):  Rate 66 bpm. OH interval *. QRS duration 130. QT/QTc 418/438. P-R-T axes * -51 -6. Atrial fibrillation with frequent ventricular-paced complexes. Left axis deviation. Right bundle branch block. Inferior infarct, age undetermined. Abnormal EKG. Interpreted at 1130 by Melissa Anglin MD.     Imaging:  XR Chest 2 Views   Final Result   IMPRESSION: Mild bibasilar atelectasis/consolidation, may represent   aspiration or infection. Possible trace bilateral pleural effusions.   No pneumothorax. Implanted cardiac pacer over the RIGHT chest in   unchanged position.      KAYLYN KEYES MD         I communicated the results of the imaging studies with the patient's daughter who expressed understanding of these findings.  The patient's daughter translated these findings to the patient who also  expressed understanding of these findings.     Laboratory:  CBC: RBC 3.89, HGB 11.5, 'HCT 37.2, MCHC 30.9, otherwise within normal limits   BMP: Glucose 131, Ca 8.0, otherwise within normal limits   Troponin <0.015  INR 2.07  Nt probnp inpatient 1421    Interventions:  1117: Albuterol neb 2.5mg   1328: Rocephin 2g IV    Emergency Department Course:  Past medical records, nursing notes, and vitals reviewed.  1038: I performed an exam of the patient and obtained history, as documented above.       The patient was sent for a CXR while in the emergency department, findings above.    Findings and plan explained to the Patient and daughter who consents to admission. The daughter translates the plan to the patient.     1329: Discussed the patient with Dr. Sorto, who will admit the patient to an Adult Med / surg bed for further monitoring, evaluation, and treatment.    Impression & Plan    Medical Decision Makin-year-old male with history of COPD, CHF, A. fib on warfarin presenting with shortness of breath and bloody sputum.  Vital signs unremarkable.  Broad differential pursued including not limited to COPD, supratherapeutic warfarin, electrolyte metabolic or renal dysfunction, sepsis, CHF, acute coronary syndrome, etc.  Considered PE but patient is therapeutic on his warfarin does not appear to have any pleuritic chest pain, this is not appear to be significant for PE.  INR is within normal range.  EKG shows A. fib versus a flutter, no acute ischemic changes, rhythm and rate controlled.  Patient does have a pacemaker.  Troponin is negative.  Patient denies any chest pain.  BNP is within normal limits not concerning for acute CHF.  BMP shows no acute electrolyte metabolic or renal dysfunction.  CBC shows no leukocytosis and chronic ongoing anemia.  Platelets are normal.  Chest x-ray shows mild bibasilar consolidation.  Given that patient is having cough that is productive of nasty sputum, worsening shortness of  breath, symptoms consistent with likely pneumonia.  He is recently taken azithromycin and has taken 3 tablets of this.  At this point he appears to have failed outpatient management.  Suspect that there is some underlying COPD exacerbation as well.  3 duo nebs were administered in addition to ceftriaxone.  We will not give further atypical coverage as patient has Jose taking this.  Given the patient is feeling weak and cough and his age opted to admit, hospitalist graciously accepted.  Critical Care time:  none    Diagnosis:    ICD-10-CM    1. Pneumonia of both lower lobes due to infectious organism J18.9    2. COPD exacerbation (H) J44.1    3. Generalized muscle weakness M62.81    4. Failure of outpatient treatment Z78.9    5. SOB (shortness of breath) R06.02    6. Cough R05        Disposition:  Admitted to Hospitalist service.     Jennifer SUN, am serving as a scribe at 10:38 AM on 9/27/2018 to document services personally performed by Melissa Anglin MD based on my observations and the provider's statements to me.    [facility] EMERGENCY DEPARTMENT       Melissa Anglin MD  09/27/18 [id number]

## 2018-09-27 NOTE — PROGRESS NOTES
"[facility] RCAT     Date: 9/27/18  Admission Dx:  CHF  Pulmonary History: COPD  Home Nebulizer/MDI Use: Albuterol Q6 prn/ MDI, Spiriva Qd  Home Oxygen: NA  Acuity Level (RCAT flow sheet):  3  Aerosol Therapy initiated: Duoneb QID, Albuterol Q2 prn      Pulmonary Hygiene initiated: Deep breathe and cough- TID      Volume Expansion initiated: IS- TID      Current Oxygen Requirements:  Room air   Current SpO2: 98%    Re-evaluation date: 9/30/18    Patient Education: Will explain RCAT process to patient.      See \"RT Assessments\" flow sheet for patient assessment scoring and Acuity Level Details.             "

## 2018-09-27 NOTE — PHARMACY-ANTICOAGULATION SERVICE
Clinical Pharmacy - Warfarin Dosing Consult     Pharmacy has been consulted to manage this patient s warfarin therapy.  Indication: Atrial Fibrillation  Therapy Goal: INR 2-2.5  Warfarin Prior to Admission: Yes  Warfarin PTA Regimen: 2.5mg on Sat/Sun/Wed  Significant drug interactions: Azithromycin  Recent documented change in oral intake/nutrition: Unknown  Dose Comments: Per chart review previous INR goal was 2-3 back in 2013.     INR   Date Value Ref Range Status   09/27/2018 2.07 (H) 0.86 - 1.14 Final   08/09/2018 1.57 (H) 0.86 - 1.14 Final       Recommend warfarin 2.5 mg today.  Pharmacy will monitor Thierry Teliz Lizzie daily and order warfarin doses to achieve specified goal.      Please contact pharmacy as soon as possible if the warfarin needs to be held for a procedure or if the warfarin goals change.

## 2018-09-27 NOTE — ED NOTES
[facility]  ED Nurse Handoff Report    Thierry Samuel is a 83 year old male   ED Chief complaint: Shortness of Breath  . ED Diagnosis:   Final diagnoses:   Pneumonia of both lower lobes due to infectious organism   COPD exacerbation (H)   Generalized muscle weakness   Failure of outpatient treatment   SOB (shortness of breath)   Cough     Allergies: No Known Allergies    Code Status: Full Code  Activity level - Baseline/Home:  Stand with Assist. Activity Level - Current:   Stand with Assist. Lift room needed: No. Bariatric: No   Needed: Yes   Isolation: No. Infection: Not Applicable.     Vital Signs:   Vitals:    09/27/18 1430 09/27/18 1445 09/27/18 1500 09/27/18 1515   BP: 112/56 125/61 117/58 120/69   Pulse:       Resp: 17 11 16 23   Temp:       TempSrc:       SpO2: 94% 93% 95% 95%       Cardiac Rhythm:  ,      Pain level: 0-10 Pain Scale: 0  Patient confused: No. Patient Falls Risk: Yes.   Elimination Status: Has voided   Patient Report - Initial Complaint: shortness of breath. Focused Assessment: respTests Performed:   Labs Ordered and Resulted from Time of ED Arrival Up to the Time of Departure from the ED   CBC WITH PLATELETS DIFFERENTIAL - Abnormal; Notable for the following:        Result Value    RBC Count 3.89 (*)     Hemoglobin 11.5 (*)     Hematocrit 37.2 (*)     MCHC 30.9 (*)     All other components within normal limits   BASIC METABOLIC PANEL - Abnormal; Notable for the following:     Glucose 131 (*)     Calcium 8.0 (*)     All other components within normal limits   INR - Abnormal; Notable for the following:     INR 2.07 (*)     All other components within normal limits   TROPONIN I   NT PROBNP INPATIENT   Abnormal Results: bnp   Treatments provided: x3 resp tx, cxr, labs iv abx   Family Comments: daughter virginia will interpret  OBS brochure/video discussed/provided to patient:  No  ED Medications:   Medications   albuterol neb solution 2.5 mg (2.5 mg Nebulization  Given 9/27/18 1252)   cefTRIAXone (ROCEPHIN) 2 g vial to attach to  ml bag for ADULTS or NS 50 ml bag for PEDS (0 g Intravenous Stopped 9/27/18 1422)     Drips infusing:  Yes  For the majority of the shift, the patient's behavior Green. Interventions performed were nebs, abx, cxr.     Severe Sepsis OR Septic Shock Diagnosis Present: No    Patient has a wound on right lower leg that daughter has been doing dressing changes . Patient could benefit from wound WOC nurse consult      ED Nurse Name/Phone Number: Viola Lott,   3:20 PM  RECEIVING UNIT ED HANDOFF REVIEW    Above ED Nurse Handoff Report was reviewed: Yes  Reviewed by: TIFFANIE ALVARADO on September 27, 2018 at 4:10 PM

## 2018-09-27 NOTE — LETTER
Transition Communication Hand-off for Care Transitions to Next Level of Care Provider    Name: Thierry Samuel  : 1935  MRN #: [id number]  Primary Care Provider: [facility]  Primary Care MD Name: Daquan RUIZ  Primary Clinic: [address] [place] [id number]  Primary Care Clinic Name: [facility]  Reason for Hospitalization:  Cough [R05]  SOB (shortness of breath) [R06.02]  Generalized muscle weakness [M62.81]  COPD exacerbation (H) [J44.1]  Failure of outpatient treatment [Z78.9]  Pneumonia of both lower lobes due to infectious organism [J18.9]  Admit Date/Time: 2018 10:29 AM    Key Recommendations:  Pt admits to indiscretions with low sodium diet. Discussed benefits of fresh/frozen vs prepackaged items. He has a working scale so educated pt/dtr on daily weights and what to watch for if retaining fluids.     Ellen Mix    AVS/Discharge Summary is the source of truth; this is a helpful guide for improved communication of patient story

## 2018-09-27 NOTE — PHARMACY-ADMISSION MEDICATION HISTORY
Admission medication history interview status for this patient is complete. See [facility] admission navigator for allergy information, prior to admission medications and immunization status.     Medication history interview source(s):Patient and Family  Medication history resources (including written lists, pill bottles, clinic record):pill bottles    Changes made to PTA medication list:  Added: azithromycin, losartan, Serflu inhaler, potassium chloride  Deleted: enalapril, hydrochlorothiazide, levaquin, omeprazole  Changed: warfarin from 5 mg daily to 5 mg on Mon, Tues, Thurs, Fri and 2.5 mg on Sun, Wed, Sat    Actions taken by pharmacist (provider contacted, etc):None     Additional medication history information:Pt's daughter went through medications and stated that both spiriva and Serflu were 1 puff once a day. There was no prescription label on the inhalers.     Medication reconciliation/reorder completed by provider prior to medication history? No    Do you take OTC medications (eg tylenol, ibuprofen, fish oil, eye/ear drops, etc)? N(Y/N)    For patients on insulin therapy: N (Y/N)    Time spent in this activity: 20 min    Prior to Admission medications    Medication Sig Last Dose Taking? Auth Provider   albuterol (2.5 MG/3ML) 0.083% neb solution Take 2.5 mg by nebulization every 6 hours as needed for shortness of breath / dyspnea or wheezing  at prn Yes Unknown, Entered By History   albuterol (PROAIR HFA, PROVENTIL HFA, VENTOLIN HFA) 108 (90 BASE) MCG/ACT inhaler Inhale 2 puffs into the lungs every 6 hours as needed for shortness of breath / dyspnea  at prn Yes Daquan Pickens MD   Atorvastatin Calcium (LIPITOR PO) Take 10 mg by mouth daily  9/27/2018 at am Yes Reported, Patient   azithromycin (ZITHROMAX) 250 MG tablet Take two tablets (500 mg) on day one and 1 tablet (250 mg) daily on days 2-5 9/27/2018 at am Yes Unknown, Entered By History   FUROSEMIDE PO Take 40 mg by mouth 2 times daily In the morning and the  afternoon 9/27/2018 at am Yes Reported, Patient   losartan (COZAAR) 25 MG tablet Take 25 mg by mouth daily 9/27/2018 at am Yes Unknown, Entered By History   POTASSIUM CHLORIDE PO Take 500 mg by mouth daily (medication name is cloruro de potasico 500 mg) 9/27/2018 at am Yes Unknown, Entered By History   tiotropium (SPIRIVA) 18 MCG inhalation capsule Inhale 18 mcg into the lungs daily 9/27/2018 at am Yes Reported, Patient   UNABLE TO FIND MEDICATION NAME: Serflu' (salmeterol xinafoate 25 mcg fluticasone propionate 250 mcg)  Inhaler 1 puff every morning 9/27/2018 at am Yes Unknown, Entered By History   warfarin (COUMADIN) 2.5 MG tablet Take 2.5 mg by mouth Take 2.5 mg (1/2 of 5 mg tablet) on Sun, Wed and Sat 9/23/2018 Yes Unknown, Entered By History   warfarin (COUMADIN) 5 MG tablet Take 5 mg by mouth Take 5 mg on Mon, Tue, Thurs, Fri 9/25/2018 Yes Unknown, Entered By History

## 2018-09-27 NOTE — IP AVS SNAPSHOT
[place] [address] [facility]    [address]    [facility] [address]    Phone:  [phone]    Fax:  [phone]                                       After Visit Summary   9/27/2018    Thierry Samuel    MRN: [id number]           After Visit Summary Signature Page     I have received my discharge instructions, and my questions have been answered. I have discussed any challenges I see with this plan with the nurse or doctor.    ..........................................................................................................................................  Patient/Patient Representative Signature      ..........................................................................................................................................  Patient Representative Print Name and Relationship to Patient    ..................................................               ................................................  Date                                   Time    ..........................................................................................................................................  Reviewed by Signature/Title    ...................................................              ..............................................  Date                                               Time          22EPIC Rev 08/18

## 2018-09-27 NOTE — IP AVS SNAPSHOT
MRN:[id number]                      After Visit Summary   9/27/2018    Thierry Samuel    MRN: [id number]           Thank you!     Thank you for choosing [facility] for your care. Our goal is always to provide you with excellent care. Hearing back from our patients is one way we can continue to improve our services. Please take a few minutes to complete the written survey that you may receive in the mail after you visit. If you would like to speak to someone directly about your visit please contact Patient Relations at [phone]. Thank you!          Patient Information     Date Of Birth          1935        Designated Caregiver       Most Recent Value    Caregiver    Will someone help with your care after discharge? yes    Name of designated caregiver [place]    Phone number of caregiver [phone]    Caregiver address SAME AS PT      About your hospital stay     You were admitted on:  September 27, 2018 You last received care in the:  [place] [address] [facility]    You were discharged on:  September 30, 2018        Reason for your hospital stay       Acute on chronic diastolic CHF exacerbation                  Who to Call     For medical emergencies, please call 911.  For non-urgent questions about your medical care, please call your primary care provider or clinic, [phone]          Attending Provider     Provider Specialty    Melissa Anglin MD Emergency Medicine    [facility], Rocio Romeo MD Internal Medicine       Primary Care Provider Office Phone # Fax #    [facility] [phone]      After Care Instructions     Activity       Your activity upon discharge: activity as tolerated            Diet       Follow this diet upon discharge: Orders Placed This Encounter      Fluid restriction 1500 ML FLUID      2 Gram Sodium Diet No Caffeine for 24 hours (once tests completed, may have caffeine)                   Follow-up Appointments     Follow-up and recommended labs and tests        Follow up with primary care provider, [facility], within 7 days  Outpatient pulmonary evaluation  Follow up INR in 2-3 days  Follow up with CORE clinic in two weeks                  Your next 10 appointments already scheduled     Oct 05, 2018  7:30 AM CDT   LAB with COTTER LAB   [facility] ([facility])    [address]  [place] [address]   [phone]           Please do not eat 10-12 hours before your appointment if you are coming in fasting for labs on lipids, cholesterol, or glucose (sugar). This does not apply to pregnant women. Water, hot tea and black coffee (with nothing added) are okay. Do not drink other fluids, diet soda or chew gum.            Oct 05, 2018  8:10 AM CDT   CORE Enrollment with Carrie De Los Santos PA-C   [facility] ([facility])    [address]  [place] [address]   [phone] OPT 2            Oct 24, 2018  8:15 AM CDT   LAB with RU LAB   [facility] ([facility])    [address]  [place] [address] [phone]           Please do not eat 10-12 hours before your appointment if you are coming in fasting for labs on lipids, cholesterol, or glucose (sugar). This does not apply to pregnant women. Water, hot tea and black coffee (with nothing added) are okay. Do not drink other fluids, diet soda or chew gum.            Oct 24, 2018  9:45 AM CDT   Core MD New with Deion Huffman MD   [facility] ([facility])    [address]  [place] [address]   [phone]              Warfarin Instruction     You have started taking a medicine called warfarin. This is a blood-thinning medicine (anticoagulant). It helps  "prevent and treat blood clots.      Before leaving the hospital, make sure you know how much to take and how long to take it.      You will need regular blood tests to make sure your blood is clotting safely. It is very important to see your doctor for regular blood tests.    Talk to your doctor before taking any new medicine (this includes over-the-counter drugs and herbal products). Many medicines can interact with warfarin. This may cause more bleeding or too much clotting.     Eating a lot of vitamin K--found in green, leafy vegetables--can change the way warfarin works in your body. Do NOT avoid these foods. Instead, try to eat the same amount each day.     Bleeding is the most common side-effect of warfarin. You may notice bleeding gums, a bloody nose, bruises and bleeding longer when you cut yourself. See a doctor at once if:   o You cough up blood  o You find blood in your stool (poop)  o You have a deep cut, or a cut that bleeds longer than 10 minutes   o You have a bad cut, hard fall, accident or hit your head (go to urgent care or the emergency room).    For women who can get pregnant: This medicine can harm an unborn baby. Be very careful not to get pregnant while taking this medicine. If you think you might be pregnant, call your doctor right away.    For more information, read \"Guide to Warfarin Therapy,  the booklet you received in the hospital.        Pending Results     Date and Time Order Name Status Description    9/29/2018 1324 Sputum Culture Aerobic Bacterial Preliminary             Statement of Approval     Ordered          09/30/18 2751  I have reviewed and agree with all the recommendations and orders detailed in this document.  EFFECTIVE NOW     Approved and electronically signed by:  Dane Leon MD           09/30/18 8196  I have reviewed and agree with all the recommendations and orders detailed in this document.  EFFECTIVE NOW     Approved and electronically signed by:  " "Dane Leon MD             Admission Information     Date & Time Provider Department Dept. Phone    2018 Rocio Sorto MD [place] [address] [facility] [phone]      Your Vitals Were     Blood Pressure Pulse Temperature Respirations Weight Pulse Oximetry    117/63 (BP Location: Left arm) 69 97.8  F (36.6  C) (Oral) 20 80.2 kg (176 lb 12.9 oz) 98%    BMI (Body Mass Index)                   32.34 kg/m2           [facility] Information     [employer] lets you send messages to your doctor, view your test results, renew your prescriptions, schedule appointments and more. To sign up, go to [email or web address] . Click on \"Log in\" on the left side of the screen, which will take you to the Welcome page. Then click on \"Sign up Now\" on the right side of the page.     You will be asked to enter the access code listed below, as well as some personal information. Please follow the directions to create your username and password.     Your access code is: [email or web address]  Expires: 2018  8:52 PM     Your access code will  in 90 days. If you need help or a new code, please call your [place] clinic or [phone].        Care EveryWhere ID     This is your Care EveryWhere ID. This could be used by other organizations to access your [facility] medical records  [id number]        Equal Access to Services     JERRY HAWKINS AH: Hadii maninder Dupree, waaxda lukristineadaha, qaybta kaalmada florina, shabbir kruger. So [facility] [phone].    ATENCIÓN: Si habla español, tiene a garner disposición servicios gratuitos de asistencia lingüística. Llame al [phone].    We comply with applicable federal civil rights laws and [place] laws. We do not discriminate on the basis of race, color, national origin, age, disability, sex, sexual orientation, or gender identity.               Review of your medicines      START taking        Dose / Directions    cephALEXin 500 MG " capsule   Commonly known as:  KEFLEX   Used for:  Pneumonia of both lower lobes due to infectious organism        Dose:  500 mg   Take 1 capsule (500 mg) by mouth 2 times daily for 3 days   Quantity:  6 capsule   Refills:  0       guaiFENesin 20 mg/mL Soln solution   Commonly known as:  ROBITUSSIN   Used for:  Cough        Dose:  10 mL   Take 10 mLs by mouth every 4 hours as needed for cough   Quantity:  118 mL   Refills:  0       hypromellose-dextran 0.1-0.3 % Soln ophthalmic solution   Commonly known as:  ARTIFICAL TEARS   Used for:  Dry eyes        Dose:  2-3 drop   Apply 2-3 drops to eye every hour as needed for dry eyes   Quantity:  30 mL   Refills:  0       predniSONE 20 MG tablet   Commonly known as:  DELTASONE   Used for:  COPD exacerbation (H)        Dose:  20 mg   Start taking on:  10/1/2018   Take 1 tablet (20 mg) by mouth daily for 3 days   Quantity:  3 tablet   Refills:  0         CONTINUE these medicines which may have CHANGED, or have new prescriptions. If we are uncertain of the size of tablets/capsules you have at home, strength may be listed as something that might have changed.        Dose / Directions    furosemide 80 MG tablet   Commonly known as:  LASIX   This may have changed:    - medication strength  - how much to take  - when to take this  - additional instructions        Dose:  80 mg   Take 1 tablet (80 mg) by mouth 2 times daily   Quantity:  30 tablet   Refills:  0         CONTINUE these medicines which have NOT CHANGED        Dose / Directions    * albuterol (2.5 MG/3ML) 0.083% neb solution        Dose:  2.5 mg   Take 2.5 mg by nebulization every 6 hours as needed for shortness of breath / dyspnea or wheezing   Refills:  0       * albuterol 108 (90 Base) MCG/ACT inhaler   Commonly known as:  PROAIR HFA/PROVENTIL HFA/VENTOLIN HFA        Dose:  2 puff   Inhale 2 puffs into the lungs every 6 hours as needed for shortness of breath / dyspnea   Quantity:  1 Inhaler   Refills:  0       LIPITOR  PO        Dose:  10 mg   Take 10 mg by mouth daily   Refills:  0       losartan 25 MG tablet   Commonly known as:  COZAAR        Dose:  25 mg   Take 25 mg by mouth daily   Refills:  0       POTASSIUM CHLORIDE PO        Dose:  500 mg   Take 500 mg by mouth daily (medication name is cloruro de potasico 500 mg)   Refills:  0       tiotropium 18 MCG capsule   Commonly known as:  SPIRIVA        Dose:  18 mcg   Inhale 18 mcg into the lungs daily   Refills:  0       UNABLE TO FIND        MEDICATION NAME: Serflu' (salmeterol xinafoate 25 mcg fluticasone propionate 250 mcg) Inhaler 1 puff every morning   Refills:  0       * warfarin 5 MG tablet   Commonly known as:  COUMADIN        Dose:  5 mg   Take 5 mg by mouth Take 5 mg on Mon, Tue, Thurs, Fri   Refills:  0       * warfarin 2.5 MG tablet   Commonly known as:  COUMADIN        Dose:  2.5 mg   Take 2.5 mg by mouth Take 2.5 mg (1/2 of 5 mg tablet) on Sun, Wed and Sat   Refills:  0       * Notice:  This list has 4 medication(s) that are the same as other medications prescribed for you. Read the directions carefully, and ask your doctor or other care provider to review them with you.      STOP taking     azithromycin 250 MG tablet   Commonly known as:  ZITHROMAX                Where to get your medicines      Some of these will need a paper prescription and others can be bought over the counter. Ask your nurse if you have questions.     Bring a paper prescription for each of these medications     cephALEXin 500 MG capsule    furosemide 80 MG tablet    guaiFENesin 20 mg/mL Soln solution    hypromellose-dextran 0.1-0.3 % Soln ophthalmic solution    predniSONE 20 MG tablet                Protect others around you: Learn how to safely use, store and throw away your medicines at www.disposemymeds.org.        ANTIBIOTIC INSTRUCTION     You've Been Prescribed an Antibiotic - Now What?  Your healthcare team thinks that you or your loved one might have an infection. Some infections can  be treated with antibiotics, which are powerful, life-saving drugs. Like all medications, antibiotics have side effects and should only be used when necessary. There are some important things you should know about your antibiotic treatment.      Your healthcare team may run tests before you start taking an antibiotic.    Your team may take samples (e.g., from your blood, urine or other areas) to run tests to look for bacteria. These test can be important to determine if you need an antibiotic at all and, if you do, which antibiotic will work best.      Within a few days, your healthcare team might change or even stop your antibiotic.    Your team may start you on an antibiotic while they are working to find out what is making you sick.    Your team might change your antibiotic because test results show that a different antibiotic would be better to treat your infection.    In some cases, once your team has more information, they learn that you do not need an antibiotic at all. They may find out that you don't have an infection, or that the antibiotic you're taking won't work against your infection. For example, an infection caused by a virus can't be treated with antibiotics. Staying on an antibiotic when you don't need it is more likely to be harmful than helpful.      You may experience side effects from your antibiotic.    Like all medications, antibiotics have side effects. Some of these can be serious.    Let you healthcare team know if you have any known allergies when you are admitted to the hospital.    One significant side effect of nearly all antibiotics is the risk of severe and sometimes deadly diarrhea caused by Clostridium difficile (C. Difficile). This occurs when a person takes antibiotics because some good germs are destroyed. Antibiotic use allows C. diificile to take over, putting patients at high risk for this serious infection.    As a patient or caregiver, it is important to understand your or  your loved one's antibiotic treatment. It is especially important for caregivers to speak up when patients can't speak for themselves. Here are some important questions to ask your healthcare team.    What infection is this antibiotic treating and how do you know I have that infection?    What side effects might occur from this antibiotic?    How long will I need to take this antibiotic?    Is it safe to take this antibiotic with other medications or supplements (e.g., vitamins) that I am taking?     Are there any special directions I need to know about taking this antibiotic? For example, should I take it with food?    How will I be monitored to know whether my infection is responding to the antibiotic?    What tests may help to make sure the right antibiotic is prescribed for me?      Information provided by:  www.cdc.gov/getsmart  U.S. Department of Health and Human Services  Centers for disease Control and Prevention  National Center for Emerging and Zoonotic Infectious Diseases  Division of Healthcare Quality Promotion             Medication List: This is a list of all your medications and when to take them. Check marks below indicate your daily home schedule. Keep this list as a reference.      Medications           Morning Afternoon Evening Bedtime As Needed    * albuterol (2.5 MG/3ML) 0.083% neb solution   Take 2.5 mg by nebulization every 6 hours as needed for shortness of breath / dyspnea or wheezing   Last time this was given:  2.5 mg on 9/30/2018 11:13 AM                                   * albuterol 108 (90 Base) MCG/ACT inhaler   Commonly known as:  PROAIR HFA/PROVENTIL HFA/VENTOLIN HFA   Inhale 2 puffs into the lungs every 6 hours as needed for shortness of breath / dyspnea                                   cephALEXin 500 MG capsule   Commonly known as:  KEFLEX   Take 1 capsule (500 mg) by mouth 2 times daily for 3 days                                      furosemide 80 MG tablet   Commonly known as:   LASIX   Take 1 tablet (80 mg) by mouth 2 times daily   Last time this was given:  80 mg on 9/30/2018  8:51 AM                                      guaiFENesin 20 mg/mL Soln solution   Commonly known as:  ROBITUSSIN   Take 10 mLs by mouth every 4 hours as needed for cough   Last time this was given:  10 mLs on 9/30/2018  8:54 AM                                   hypromellose-dextran 0.1-0.3 % Soln ophthalmic solution   Commonly known as:  ARTIFICAL TEARS   Apply 2-3 drops to eye every hour as needed for dry eyes                                   LIPITOR PO   Take 10 mg by mouth daily   Last time this was given:  10 mg on 9/30/2018  8:51 AM                                   losartan 25 MG tablet   Commonly known as:  COZAAR   Take 25 mg by mouth daily   Last time this was given:  25 mg on 9/30/2018  8:52 AM                                   POTASSIUM CHLORIDE PO   Take 500 mg by mouth daily (medication name is cloruro de potasico 500 mg)                                   predniSONE 20 MG tablet   Commonly known as:  DELTASONE   Take 1 tablet (20 mg) by mouth daily for 3 days   Start taking on:  10/1/2018   Last time this was given:  20 mg on 9/30/2018  8:51 AM                                   tiotropium 18 MCG capsule   Commonly known as:  SPIRIVA   Inhale 18 mcg into the lungs daily                                   UNABLE TO FIND   MEDICATION NAME: Serflu' (salmeterol xinafoate 25 mcg fluticasone propionate 250 mcg) Inhaler 1 puff every morning                                   * warfarin 5 MG tablet   Commonly known as:  COUMADIN   Take 5 mg by mouth Take 5 mg on Mon, Tue, Thurs, Fri   Last time this was given:  6 mg on 9/29/2018  4:02 PM                                   * warfarin 2.5 MG tablet   Commonly known as:  COUMADIN   Take 2.5 mg by mouth Take 2.5 mg (1/2 of 5 mg tablet) on Sun, Wed and Sat   Last time this was given:  6 mg on 9/29/2018  4:02 PM                                   * Notice:  This list has  4 medication(s) that are the same as other medications prescribed for you. Read the directions carefully, and ask your doctor or other care provider to review them with you.

## 2018-09-27 NOTE — H&P
Admitted:     09/27/2018      PRIMARY CARE CLINIC:  [facility]      The patient is non-English speaking.  Information obtained from his daughter, [place] at his bedside, who is the .        CHIEF COMPLAINT:  Increased shortness of breath, cough and bloody phlegm.      HISTORY OF PRESENT ILLNESS:  Mr. Ivan Samuel is an 83-year-old gentleman who is a resident of [facility] and has been [place] visiting [place] since 08/2018.  He has a past history significant for COPD, atrial fibrillation, status post permanent pacemaker on chronic anticoagulation, congestive heart failure, hypertension, dyslipidemia, chronic leg swelling, ex-smoker, who was last evaluated in the emergency room a month ago for increased leg swelling, diagnosed as congestive heart failure and prescribed increased Lasix that he was taking 80 mg twice a day up to 2 weeks ago and now is on 40 mg twice a day.  The patient has had chronic cough with clear phlegm until about 2 days ago when he started to have increase in his cough with bloody phlegm and increased shortness of breath associated with orthopnea.  His daughter does admit that the patient was up [place] visiting friends and [place]  She denies any excessive salt on that, but does admit that he ate a sandwich with ham early this morning.      PAST MEDICAL HISTORY:   1.  COPD.     2.  Obstructive sleep apnea, on CPAP.   3.  Congestive heart failure/cardiomegaly, details not known.   4.  Status post pacemaker for arrhythmias, placed about 10 years ago.   5.  Obesity.   6.  History of tobacco use, quit in 1971.   7.  Chronic leg swelling.   8.  Dyslipidemia.   9.  Atrial fibrillation on chronic anticoagulation with warfarin.   10.  Hypertension.   11.  History of GI bleed, status post EGD back home in 06/2018, was told he had old ulcers with no active bleeding.  He was advised omeprazole, which his physician here at the [facility] advised him to stop it.   12.  Right lower extremity wound.      PAST SURGICAL HISTORY:   1.  Appendectomy.     2.  Permanent pacemaker.   3.  Left ankle surgery for a chronic nonhealing wound years ago.      SOCIAL HISTORY:     1.  He is , has 3 children, 1 of his daughters lives in the [place].  He is from [facility] and is in the [place] visiting his daughter since 08/2018.     2.  History of tobacco use, quit in 1971, smoked 2 packs per day for about 15 years.     3.  Positive alcohol use, about 1 alcoholic drink twice a week.      CODE STATUS:  FULL CODE.      FAMILY HISTORY:  Noncontributory.      MEDICATIONS:   1.  Lasix 40 mg twice a day.   2.  Lipitor 10 mg daily.   3.  Losartan 50 mg daily.   4.  Warfarin daily, last dose was on Tuesday when his INR was 3.   5.  Spiriva once a day.   6.  Albuterol inhaler 4 times a day.   7.  Albuterol nebulizer p.r.n.   8.  Potassium daily.   9.  Fluticasone salmeterol inhaler daily.      ALLERGIES TO MEDICATIONS:  NO KNOWN DRUG ALLERGIES.      REVIEW OF SYSTEMS:  As in history of present illness, otherwise the 10-point review of systems was reviewed and includes patient usually uses either a walker or a cane at home.  He used to walk around about a block about a week ago and now his difficulty ambulating due to shortness of breath.      PHYSICAL EXAMINATION:   GENERAL:  The patient is obese, awake, alert, oriented to time, place and person, in no acute respiratory distress, lying on the  gurney with the head of bed elevated at 30 degrees.  Speech is coherent.   VITAL SIGNS:  Temperature 98.4, pulse 65, respirations 22, blood pressure 129/60, O2 saturation on room air is 96%.   SKIN:  Warm, dry.  He has a well-healed scar over the medial aspect of his left foot.  There is stasis dermatitis in both lower extremities with superficial nonhealing wound over the lower 3rd of his right leg.   NECK:  Short, difficult to visualize elevated JVD.  I do not  feel thyromegaly or lymphadenopathy.   EYES:  Anicteric, PERRLA, EOMI.   EARS, NOSE AND THROAT:  Clear.   LUNGS:  Rales at bilateral lower 1/3 without any wheezing heard.   HEART:  Irregular S1, S2, without any murmurs, gallops or rubs.   ABDOMEN:  Distended, soft, nontender, difficult to feel any organomegaly or masses.  Bowel sounds are present.   EXTREMITIES:  There is 1+ pitting edema, especially on the right compared to the left, which is chronic, and a nonhealing superficial wound on the right lower leg as described above.      LABORATORY DATA:  EKG showed atrial fibrillation with frequent PVCs, left axis deviation, right bundle branch block, old inferior infarct.  Chest x-ray reported as mild bibasilar atelectasis or consolidation may represent aspiration or infection.  Possible trace bilateral pleural effusions, no pneumothorax.  There is an implanted cardiac pacer over the right chest.  Troponin is less than 0.015.  ProBNP is 1421, creatinine 0.76.  White count is 7.2, hemoglobin 11.5.  INR is 2.07.      IMPRESSION AND PLAN:  Mr. Ivan aSmuel is an 83-year-old gentleman who is visiting this country from [facility] since the past 3 months.  He has a history of COPD, atrial fibrillation on chronic anticoagulation, status post pacemaker, congestive heart failure, chronic leg swelling, hypertension, obstructive sleep apnea on CPAP.  He was last seen a month ago for increased leg edema and diagnosed with congestive heart failure and advised to increase dose of Lasix until about 2 weeks ago.  He now presents with increased shortness of breath, cough and bloody phlegm.   1.  Acute exacerbation of chronic congestive heart failure.  Question if this is due to inadequate medications associated with dietary noncompliance with salt.  ProBNP is elevated.  He does have underlying congestive heart failure, cardiomegaly, unclear etiology.  His EKG shows chronic atrial fibrillation with a controlled ventricular rate.  His  troponin is normal.  The patient will be admitted to receive IV Lasix 40 mg IV b.i.d. in place of his oral 40 mg b.i.d. and continue prior to admission losartan.  We will get an echo in the morning, thyroid function tests, place him on a 1500 mL fluid with strict I's and O's, daily weights and CHF education.     2.  Possible bilateral community-acquired pneumonia, as his chest x-ray shows bibasilar atelectasis or consolidation.  We will continue with antibiotic treatment started in the emergency room with IV Rocephin and prior to admission azithromycin and complete the course.  He will have DuoNeb given 4 times a day.  Continue prior to admission fluticasone salmeterol inhaler, hold the Spiriva for now and have albuterol nebulizer available every 2 hours p.r.n.  I do not think he needs a steroid burst, but we will consider that.  Encourage incentive spirometry.   3.  Chronic atrial fibrillation with a controlled ventricular rate, status post permanent pacemaker.  The patient will continue with warfarin, with pharmacy to dose.  His last dose of Coumadin was 2 days ago when his INR was supratherapeutic.  INR today is 2.07.   4.  Hypertension.  Blood pressure is acceptable.  Continue his prior to admission losartan, but we will hold if systolic less than 100, as we are diuresing him.   5.  Hemoptysis, likely secondary to his CHF and/or respiratory infection in the presence of chronic anticoagulation.  Given his history of tobacco use, concern for possible underlying cancer is always a possibility.  My plan is to treat the congestive heart failure and pneumonia and repeat his chest x-ray and/or do a CT of the chest as an outpatient.   6.  Obstructive sleep apnea, on CPAP, continue.   7.  Dyslipidemia.  Continue prior to admission atorvastatin.   8.  History of GI bleed, status post endoscopy showing healed ulcers in 06/2018, currently asymptomatic.  Continue to monitor.   9.  DVT prophylaxis, on chronic anticoagulation  with Coumadin.   10.  CODE STATUS:  FULL CODE as discussed with the patient and his daughter who is interpreting for us.        DISPOSITION:  The patient will be admitted as an inpatient, as he requires more than 2 midnight stay.  We will have PT to evaluate for discharge planning.         RONN ZUNIGA MD             D: 2018   T: 2018   MT: GERMAN      Name:     MONSERRAT MILLER   MRN:      [id number]        Account:      [id number]   :      1935        Admitted:     2018                   Document: [id number]       cc: [facility]

## 2018-09-28 ENCOUNTER — APPOINTMENT (OUTPATIENT)
Dept: CARDIOLOGY | Facility: CLINIC | Age: 83
End: 2018-09-28
Attending: INTERNAL MEDICINE
Payer: MEDICAID

## 2018-09-28 ENCOUNTER — OFFICE VISIT (OUTPATIENT)
Dept: INTERPRETER SERVICES | Facility: CLINIC | Age: 83
End: 2018-09-28
Payer: MEDICAID

## 2018-09-28 ENCOUNTER — CARE COORDINATION (OUTPATIENT)
Dept: CARDIOLOGY | Facility: CLINIC | Age: 83
End: 2018-09-28

## 2018-09-28 DIAGNOSIS — R06.02 SOB (SHORTNESS OF BREATH): ICD-10-CM

## 2018-09-28 DIAGNOSIS — I50.32 CHRONIC DIASTOLIC CONGESTIVE HEART FAILURE (H): Primary | ICD-10-CM

## 2018-09-28 LAB
ANION GAP SERPL CALCULATED.3IONS-SCNC: 6 MMOL/L (ref 3–14)
BUN SERPL-MCNC: 15 MG/DL (ref 7–30)
CALCIUM SERPL-MCNC: 8.7 MG/DL (ref 8.5–10.1)
CHLORIDE SERPL-SCNC: 104 MMOL/L (ref 94–109)
CO2 SERPL-SCNC: 27 MMOL/L (ref 20–32)
CREAT SERPL-MCNC: 0.75 MG/DL (ref 0.66–1.25)
GFR SERPL CREATININE-BSD FRML MDRD: >90 ML/MIN/1.7M2
GLUCOSE SERPL-MCNC: 86 MG/DL (ref 70–99)
INR PPP: 1.71 (ref 0.86–1.14)
INTERPRETATION ECG - MUSE: NORMAL
POTASSIUM SERPL-SCNC: 4.3 MMOL/L (ref 3.4–5.3)
SODIUM SERPL-SCNC: 137 MMOL/L (ref 133–144)
TSH SERPL DL<=0.005 MIU/L-ACNC: 1.21 MU/L (ref 0.4–4)

## 2018-09-28 PROCEDURE — 25000132 ZZH RX MED GY IP 250 OP 250 PS 637: Performed by: INTERNAL MEDICINE

## 2018-09-28 PROCEDURE — 40000275 ZZH STATISTIC RCP TIME EA 10 MIN

## 2018-09-28 PROCEDURE — 25000128 H RX IP 250 OP 636: Performed by: INTERNAL MEDICINE

## 2018-09-28 PROCEDURE — 12000007 ZZH R&B INTERMEDIATE

## 2018-09-28 PROCEDURE — 36415 COLL VENOUS BLD VENIPUNCTURE: CPT | Performed by: INTERNAL MEDICINE

## 2018-09-28 PROCEDURE — T1013 SIGN LANG/ORAL INTERPRETER: HCPCS | Mod: U3

## 2018-09-28 PROCEDURE — 99207 ZZC CDG-MDM COMPONENT: MEETS MODERATE - UP CODED: CPT | Performed by: INTERNAL MEDICINE

## 2018-09-28 PROCEDURE — G0463 HOSPITAL OUTPT CLINIC VISIT: HCPCS | Mod: 25

## 2018-09-28 PROCEDURE — 25500064 ZZH RX 255 OP 636: Performed by: INTERNAL MEDICINE

## 2018-09-28 PROCEDURE — 94640 AIRWAY INHALATION TREATMENT: CPT

## 2018-09-28 PROCEDURE — 94640 AIRWAY INHALATION TREATMENT: CPT | Mod: 76

## 2018-09-28 PROCEDURE — 97602 WOUND(S) CARE NON-SELECTIVE: CPT

## 2018-09-28 PROCEDURE — 85610 PROTHROMBIN TIME: CPT | Performed by: INTERNAL MEDICINE

## 2018-09-28 PROCEDURE — 84443 ASSAY THYROID STIM HORMONE: CPT | Performed by: INTERNAL MEDICINE

## 2018-09-28 PROCEDURE — 80048 BASIC METABOLIC PNL TOTAL CA: CPT | Performed by: INTERNAL MEDICINE

## 2018-09-28 PROCEDURE — 99233 SBSQ HOSP IP/OBS HIGH 50: CPT | Performed by: INTERNAL MEDICINE

## 2018-09-28 PROCEDURE — 40000893 ZZH STATISTIC PT IP EVAL DEFER: Performed by: PHYSICAL THERAPIST

## 2018-09-28 PROCEDURE — 25000125 ZZHC RX 250: Performed by: INTERNAL MEDICINE

## 2018-09-28 PROCEDURE — 93306 TTE W/DOPPLER COMPLETE: CPT | Mod: 26 | Performed by: INTERNAL MEDICINE

## 2018-09-28 RX ORDER — LOSARTAN POTASSIUM 25 MG/1
25 TABLET ORAL DAILY
Status: DISCONTINUED | OUTPATIENT
Start: 2018-09-28 | End: 2018-09-30 | Stop reason: HOSPADM

## 2018-09-28 RX ORDER — ALBUTEROL SULFATE 0.83 MG/ML
2.5 SOLUTION RESPIRATORY (INHALATION) 4 TIMES DAILY
Status: DISCONTINUED | OUTPATIENT
Start: 2018-09-28 | End: 2018-09-30 | Stop reason: HOSPADM

## 2018-09-28 RX ORDER — ATORVASTATIN CALCIUM 10 MG/1
10 TABLET, FILM COATED ORAL DAILY
Status: DISCONTINUED | OUTPATIENT
Start: 2018-09-28 | End: 2018-09-30 | Stop reason: HOSPADM

## 2018-09-28 RX ORDER — PREDNISONE 20 MG/1
20 TABLET ORAL DAILY
Status: DISCONTINUED | OUTPATIENT
Start: 2018-09-28 | End: 2018-09-30 | Stop reason: HOSPADM

## 2018-09-28 RX ORDER — SILVER SULFADIAZINE 10 MG/G
CREAM TOPICAL DAILY
Status: DISCONTINUED | OUTPATIENT
Start: 2018-09-28 | End: 2018-09-30 | Stop reason: HOSPADM

## 2018-09-28 RX ORDER — WARFARIN SODIUM 5 MG/1
5 TABLET ORAL
Status: COMPLETED | OUTPATIENT
Start: 2018-09-28 | End: 2018-09-28

## 2018-09-28 RX ADMIN — ALBUTEROL SULFATE 2.5 MG: 2.5 SOLUTION RESPIRATORY (INHALATION) at 15:42

## 2018-09-28 RX ADMIN — FUROSEMIDE 40 MG: 10 INJECTION, SOLUTION INTRAVENOUS at 05:40

## 2018-09-28 RX ADMIN — ALBUTEROL SULFATE 2.5 MG: 2.5 SOLUTION RESPIRATORY (INHALATION) at 12:23

## 2018-09-28 RX ADMIN — Medication: at 12:06

## 2018-09-28 RX ADMIN — IPRATROPIUM BROMIDE AND ALBUTEROL SULFATE 3 ML: .5; 3 SOLUTION RESPIRATORY (INHALATION) at 08:04

## 2018-09-28 RX ADMIN — ATORVASTATIN CALCIUM 10 MG: 10 TABLET, FILM COATED ORAL at 08:59

## 2018-09-28 RX ADMIN — LOSARTAN POTASSIUM 25 MG: 25 TABLET ORAL at 08:59

## 2018-09-28 RX ADMIN — WARFARIN SODIUM 5 MG: 5 TABLET ORAL at 17:21

## 2018-09-28 RX ADMIN — CEFTRIAXONE SODIUM 1 G: 1 INJECTION, POWDER, FOR SOLUTION INTRAMUSCULAR; INTRAVENOUS at 12:07

## 2018-09-28 RX ADMIN — ALBUTEROL SULFATE 2.5 MG: 2.5 SOLUTION RESPIRATORY (INHALATION) at 06:29

## 2018-09-28 RX ADMIN — PREDNISONE 20 MG: 20 TABLET ORAL at 12:07

## 2018-09-28 RX ADMIN — SILVER SULFADIAZINE: 10 CREAM TOPICAL at 13:00

## 2018-09-28 RX ADMIN — HUMAN ALBUMIN MICROSPHERES AND PERFLUTREN 3 ML: 10; .22 INJECTION, SOLUTION INTRAVENOUS at 09:00

## 2018-09-28 RX ADMIN — ALBUTEROL SULFATE 2.5 MG: 2.5 SOLUTION RESPIRATORY (INHALATION) at 19:15

## 2018-09-28 RX ADMIN — AZITHROMYCIN MONOHYDRATE 250 MG: 250 TABLET ORAL at 08:59

## 2018-09-28 ASSESSMENT — ACTIVITIES OF DAILY LIVING (ADL)
ADLS_ACUITY_SCORE: 15
ADLS_ACUITY_SCORE: 15
ADLS_ACUITY_SCORE: 16
ADLS_ACUITY_SCORE: 15

## 2018-09-28 NOTE — PLAN OF CARE
Problem: Patient Care Overview  Goal: Plan of Care/Patient Progress Review      Discharge Planner PT   Patient plan for discharge: Home with daughter  Current status: PT/CR: PT and cardiac rehab orders received. Per chart review, discussion with Rn, and discussion with pt the pt has no mobility concerns at this time and is mobilizing at/near baseline. Pt does use a walker or cane when feeling weak at home, but declines the need for either at this time. Re: cardiac rehab the pt's CHF is not a new diagnosis and does not require IP cardiac rehab at this time. Pt and daughter in agreement.   Barriers to return to prior living situation: None from a mobility point of view  Recommendations for discharge: Home with daughter  Rationale for recommendations: Pt appears to mobilizing at/near baseline. NO IPPT needs identified. Will complete orders.        Entered by: Carrie Barfield 09/28/2018 11:33 AM

## 2018-09-28 NOTE — PLAN OF CARE
Problem: Cardiac: Heart Failure (Adult)  Goal: Signs and Symptoms of Listed Potential Problems Will be Absent, Minimized or Managed (Cardiac: Heart Failure)  Signs and symptoms of listed potential problems will be absent, minimized or managed by discharge/transition of care (reference Cardiac: Heart Failure (Adult) CPG).   Outcome: Improving  Heart Failure Care Pathway  GOALS TO BE MET BEFORE DISCHARGE:    1. Decrease congestion and/or edema with diuretic therapy to achieve near      optimal volume status.            Dyspnea improved:  Yes improving but still sl. SOB, wears CPAP when asleep/napping             Edema improved:     Yes        Net I/O and Weights since admission:          08/29 2300 - 09/28 2259  In: 1325 [P.O.:1325]  Out: 3270 [Urine:3270]  Net: -1945            Vitals:    09/27/18 1633 09/28/18 0647   Weight: 81.6 kg (180 lb) 78.9 kg (173 lb 15.1 oz)       2.  O2 sats > 92% on RA or at prior home O2 therapy level.          Current oxygenation status:       SpO2: 94 %         O2 Device: None (Room air),            Able to wean O2 this shift to keep sats > 92%:  Yes       Does patient use Home O2? No    3.  Tolerates ambulation and mobility near baseline: No, please explain: still Sl. SOB but improving , up in room , PT ordered but pretty much at baseline         How many times did the patient ambulate with nursing staff this shift? 1- up with daughter   [place] [facility]  ( spouse in [place]- had a stroke & is with other kids)   Care cord. Consulting for ongoing /F/[facility] .   Wound RN consulting for rt shin cellulitis wound-   Cards consulting for CHF     Please review the Heart Failure Care Pathway for additional HF goal outcomes.    Nia Desai RN  9/28/2018

## 2018-09-28 NOTE — PLAN OF CARE
Problem: Patient Care Overview  Goal: Plan of Care/Patient Progress Review  Outcome: No Change  A&O, language barrier present, daughter at bedside. 94% on CPAP at night, O2 sat drops with ambulation, c/o SOB, PRN neb given, VSS. Tele V-Paced with underlying afib. LS coarse. 1+/2+ BLE edema. 1500 mL fluid restriction. On IV lasix, good UOP. Up with SBA. [facility] clinic, cardiac rehab, PT following. Plan for echo today, continue with POC.    /62 (BP Location: Left arm)  Pulse 65  Temp 98.5  F (36.9  C) (Axillary)  Resp 20  Wt 78.9 kg (173 lb 15.1 oz)  SpO2 96%  BMI 31.81 kg/m2     Heart Failure Care Pathway  GOALS TO BE MET BEFORE DISCHARGE:    1. Decrease congestion and/or edema with diuretic therapy to achieve near      optimal volume status.            Dyspnea improved:  No, please explain: SOB            Edema improved:     No, please explain: 1+/2+ BLE edema        Net I/O and Weights since admission:          08/29 1500 - 09/28 1459  In: 625 [P.O.:625]  Out: 2170 [Urine:2170]  Net: -1545            Vitals:    09/27/18 1633 09/28/18 0647   Weight: 81.6 kg (180 lb) 78.9 kg (173 lb 15.1 oz)       2.  O2 sats > 92% on RA or at prior home O2 therapy level.          Current oxygenation status:       SpO2: 96 %         O2 Device: None (Room air),            Able to wean O2 this shift to keep sats > 92%:  Yes       Does patient use Home O2? No    3.  Tolerates ambulation and mobility near baseline: SBA          How many times did the patient ambulate with nursing staff this shift? 2    Please review the Heart Failure Care Pathway for additional HF goal outcomes.    Elayne Severino RN  9/28/2018

## 2018-09-28 NOTE — CONSULTS
"NUTRITION ASSESSMENT & EDUCATION NOTE    REASON FOR ASSESSMENT  Thierry Samuel is a 83 year old male seen by Registered Dietitian for Provider Order - Nutrition Education - Congestive Heart Failure (CHF) - Dietitian to instruct patient on 2 gram sodium diet     Nutrition History  Nutrition History:  - Information obtained from patient's daughter at bedside, EMR.   - [place] [facility] [place] [facility] been [place] visiting [place] since 8/2018. PMH COPD, afib, s/p permanent pacemaker on chronic anticoagulation, CHF, hypertension, chronic leg swelling, ex smoker.   - Daughter does the grocery shopping and food prep.   - She notes that they have not gotten much education on the low sodium diet and do not really understand the recommendations. They have not been using the salt shaker but aren't sure what else needs to be changed. She was surprised to hear that a ham sandwich was high in salt.   - Thierry and his family eat ham sandwiches, beans, rice, veggies. They usually use dry rice but may use canned beans to cook with as well.   - No salt shaker, though she admits to using some seasonings with salt.   - She does not read labels  - they would like additional resources to help follow a low salt diet.   - Plan to discharge back to daughter's home.     CURRENT DIET ORDER  Diet:  2000 mg Sodium  Intake/Tolerance:  100% intakes recorded for one meal since admission.     Anthropometrics  Height: 5' 2\" (previous encounter)  Weight: 78.9 kg   BMI Calculated: 31.6 kg/m^2  IBW:  53.6 kg  Weight Status:  Obesity Grade I BMI 30-34.9  % IBW:  147%  Weight History:   Wt Readings from Last 10 Encounters:   09/28/18 78.9 kg (173 lb 15.1 oz)   08/20/13 78.9 kg (173 lb 14.4 oz)       LABS  Reviewed     MEDICATIONS  Reviewed     ASSESSED NUTRITION NEEDS PER APPROVED PRACTICE GUIDELINES: Dosing weight = 59.9 kg (adj dw)   Estimated Energy Needs: 1658-2353 kcals (25-30 Kcal/Kg)  Justification: maintenance  Estimated Protein Needs: " 72-90 grams protein (1.2-1.5 g pro/Kg)  Justification: preservation of lean body mass  Estimated Fluid Needs: 1500 mL restriction (1 mL/Kcal)  Justification: maintenance    MALNUTRITION:  Patient does not meet two of the following criteria necessary for diagnosing malnutrition: significant weight loss, reduced intake, subcutaneous fat loss, muscle loss or fluid retention    NUTRITION DIAGNOSIS    Food- and nutrition- related knowledge deficit R/t reduced sodium nutrition therapy AEB patient's daughter expresses need for diet education and materials.      INTERVENTIONS    Nutrition Prescription:  2g Na diet      Implementation    Assessed learning needs, learning preferences, and willingness to learn.    Nutrition Education (Content):  a) Provided handout   a. Reduced sodium nutrition therapy  b. Lower sodium foods likst  b) Discussed   a. Rationale for diet  b. Foods to avoid  c. Encouraged fresh foods, avoid boxed products   d. Avoid salt shaker    Nutrition Education (Application):  a) Discussed eating habits and recommended alternative food choices  b) Use fresh meats  c) Dry beans and rice when able  d) No salt seasonings    Anticipate good compliance    Diet Education - refer to Education Flowsheet    Goals    Patient's daughter verbalizes understanding of diet     All the above goals met during education session    Evaluation/Monitoring     Recommend Out-Patient Nutrition Referral if further diet instructions are needed    Will re-evaluate in 7 days, on sooner, if nutrition status changes    Hailey Groves RD, LD  3rd floor/ICU: [phone]  All other floors: [phone]  Weekend/holiday: [phone]  Office: [phone]

## 2018-09-28 NOTE — CONSULTS
[facility] referral received from Dr Sorto.     Patient is not currently established in the [facility].      Hospital nurse, please give pt [facility]/HF education.       Suggest hospitalist to order Care Coordinator to help with discharge planning.     Please suggest to hospitalist to consult cardiology    [facility] appointment made for:  Friday Oct 5 2018 at 7:30 am for labs and 8:00 am with Starla Lee PA-C. THIS IS IN [place]  Wednesday Oct 24 2018 at 8:15 am for labs and 9:00 am with Dr Huffman. This is in [place].     We look forward to seeing Thierry in the clinic.   Please call with questions.     Rocío Robledo RN  [facility] Care Coordinator  [facility]  [phone]      [facility]: Cardiomyopathy, Optimization, Rehabilitation, Education   The [facility] Clinic is a heart failure specialty clinic within the [facility] where you will work with your cardiologist, nurse practitioners, physician assistants and registered nurses who specialize in heart failure care. They are dedicated to helping patients with heart failure to carefully adjust medications, receive education, and learn who and when to call if symptoms develop. They specialize in helping you better understand your condition, slow the progression of your disease, improve the length and quality of your life, help you detect future heart problems before they become life threatening, and avoid hospitalizations.

## 2018-09-28 NOTE — PLAN OF CARE
Problem: Cardiac: Heart Failure (Adult)  Goal: Signs and Symptoms of Listed Potential Problems Will be Absent, Minimized or Managed (Cardiac: Heart Failure)  Signs and symptoms of listed potential problems will be absent, minimized or managed by discharge/transition of care (reference Cardiac: Heart Failure (Adult) CPG).  Outcome: No Change      Heart Failure Care Pathway  GOALS TO BE MET BEFORE DISCHARGE:    1. Decrease congestion and/or edema with diuretic therapy to achieve near      optimal volume status.            Dyspnea improved:  Yes, with scheduled nebulizer            Edema improved:     No, please explain: no changes        Net I/O and Weights since admission:          08/28 2300 - 09/27 2259  In: 200 [P.O.:200]  Out: 770 [Urine:770]  Net: -570            Vitals:    09/27/18 1633   Weight: 81.6 kg (180 lb)       2.  O2 sats > 92% on RA or at prior home O2 therapy level.          Current oxygenation status:       SpO2: 99 %         O2 Device: None (Room air),            Able to wean O2 this shift to keep sats > 92%:  No, please explain: pt does not use Oxygen       Does patient use Home O2? No    3.  Tolerates ambulation and mobility near baseline: Yes        How many times did the patient ambulate with nursing staff this shift? 1        Pt non English speaking, daughter present at bedside,  requested, LS coarse, GUNTER, denies pain, Tele Afib CVR, on Lasix IV, voiding well, up with SBA, Rocephin IV and Zithro po for poss PNA, 1500FR will continue with POC  Please review the Heart Failure Care Pathway for additional HF goal outcomes.    TIFFANIE ALVARADO RN  9/27/2018

## 2018-09-28 NOTE — PROGRESS NOTES
Discharge Planner   Discharge Plans in progress: Yes  Barriers to discharge plan: IV antibiotic & diuretic  Follow up plan: F/U with Daquan RUIZ at [facility]. Family to make appointment.        Entered by: Ellen Mix 09/28/2018 11:33 AM

## 2018-09-28 NOTE — PROGRESS NOTES
[facility] Nurse Inpatient Wound Assessment     Assessment of wound(s) on pt's:   RLE        Data:   Patient History:      per MD note(s):  83-year-old gentleman who is a resident of [facility] and has been [place] since [place].  He has a past history significant for COPD, atrial fibrillation, status post permanent pacemaker on chronic anticoagulation, congestive heart failure, hypertension, dyslipidemia, chronic leg swelling, ex-smoker, who was last evaluated in the emergency room a month ago for increased leg swelling, diagnosed as congestive heart failure and prescribed increased Lasix that he was taking 80 mg twice a day up to 2 weeks ago and now is on 40 mg twice a day.  The patient has had chronic cough with clear phlegm until about 2 days ago when he started to have increase in his cough with bloody phlegm and increased shortness of breath associated with orthopnea.  His daughter does admit that [place]  She denies any excessive salt on that, but does admit that he ate a sandwich with ham early this morning.        Current Diet / Nutrition:           Active Diet Order      2 Gram Sodium Diet No Caffeine for 24 hours (once tests completed, may have caffeine)            Kenney Assessment and sub scores:   Kenney Score  Av  Min: 18  Max: 18     Mattress:  Standard , Atmos Air mattress    Labs:         Recent Labs   Lab Test  18   0619  18   1106  18   1758   ALBUMIN   --    --   2.6*   HGB   --   11.5*  10.2*   RBC   --   3.89*  3.38*   WBC   --   7.2  7.8   PLT   --   230  343   INR  1.71*  2.07*  1.57*          Wound Assessment (location #1):   RLE anterior lower  Wound History:  Pt reports traumatic injury last February    Specific Dimensions (length x width x depth, in cm):   Irregular shape 7 x 5cm    Wound Base: adhered yellow slough 95% at center with edges 5% pink   moist      Periwound Skin: intact dry flaky skin with scar tissue indicative of larger wound now healing,      Drainage:    Amount: small,  Color: serous and yellow    Odor: none    Pain:  absent ,           Intervention:     Patient's chart evaluated.      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   Family had been using products from [facility] antibiotic ointment and  Calamine wrap dressing.   Recommend to start using Silvadene cream daily to gently debride and cleanse wound. Daughter is a nurse  In [facility] and has  Excellent wound care knowledge.           Plan:     Nursing to notify the Provider(s) and re-consult the [facility] Nurse if wound(s) deteriorate(s) or if the wound care plan needs reevaluation.    Plan of care for wound located on RLE: Daily    1. Wash leg with soap and water,  Rinse with microKlenz spray    2. Apply layer of Sween cream to  Intact moist skin right up to wound edge and foot    3. Place small amount  Of Silvadene cream to Adaptic, spread across dressing and place on wound    4. Cover with ABD or Gauze dressing (may change outside dressing according to drainage PRN)    5. Secure  With Flex Net stocking    6. Encourage elevation of legs often during the day and pump the foot for improved circulation.        WO Nurse will return: weekly

## 2018-09-28 NOTE — PROGRESS NOTES
Pt dewitt not been seen by cardiology during hospital stay. Request hospitalist for pt to see cardiology.   Appts made with Starla 10/5 (no sooner openings in [place]) and kane/Dr Huffman 10/24 ( no sooner openings). Floor nurse to verify with pt and daughter if appts would be ok.   Orders placed per [facility] recommendations.   Rocío Robledo RN 5:49 PM 09/28/18

## 2018-09-28 NOTE — PROGRESS NOTES
Patient's demographics show no insurance. CM contacted Financial Counselor's office [phone]) to request follow up with patient re: insurance.     Malu Mix RN, BSN, CTS  [facility]  [phone]

## 2018-09-28 NOTE — PROGRESS NOTES
[facility]  Hospitalist Progress Note for 9/28/2018:          Assessment and Plan:    Mr. Ivan Samuel is an 83-year-old gentleman who is visiting this country from [facility] since the past 3 months.  He has a history of COPD, atrial fibrillation on chronic anticoagulation, status post pacemaker, congestive heart failure, chronic leg swelling, hypertension, obstructive sleep apnea on CPAP.  He was last seen a month ago for increased leg edema and diagnosed with congestive heart failure and advised to increase dose of Lasix until about 2 weeks ago.  He now presents with increased shortness of breath, cough and bloody phlegm.      Acute exacerbation of chronic congestive heart failure:  Has a hx chronically feeling SOB. Presented with increased symptoms of SOB, cough & hemoptysis  Question if this is due to decrease in diuretics  associated with dietary noncompliance.  ProBNP is elevated. EKG shows chronic atrial fibrillation with a controlled ventricular rate. Troponin is normal.    - continue ESOB (possible underling deconditioning,? Cardiomyopathy,COPD) despite diuresis, I/os -ve 1,545 &  wt down 6 lb  from yest  - continue current IV Lasix, I/Os , daily wts, tele,1500 mL fluid restriction. CHF education  - resume PTA Losartan with hold parameters  - ECHO pending, check TFTs      Possible bilateral community-acquired pneumonia:   COPD, with mild exacerbation due to CHF:   chest x-ray shows bibasilar atelectasis or consolidation. Afebrile, nl WBC.  - continue AB started in the emergency room with IV Rocephin and PTA  azithromycin and complete the course.  - continue DuoNeb 4 times a day. Albuterol neb prn  - continue PTA fluticasone salmeterol inhaler, hold the Spiriva for now while on Neb  - due to intermittent wheezing & continue SOB start a 5 day steroid burst, but we may need to consider if SOB not improving. Encourage incentive spirometry.      Hemoptysis:    Chronic dyspnea, possible  deconditioning:   likely secondary to his CHF and/or respiratory infection in the presence of chronic anticoagulation & recent supra therapeutic INR.Sputum appears rust colored.  - treat as above ,monitor. Resume PTA warfarin as INR today 2.07 (goal INR 2-2.5).  - Given his history of tobacco use, concern for possible underlying cancer is always a possibility.    - plan is to treat the congestive heart failure and pneumonia and repeat CXR in 8 weeks to see resolution or consider as outpt vs  CT chest while here if he does not improve.     Chronic atrial fibrillation with a controlled ventricular rate ,s/p PPM:   On chronic warfarin, dose held x 2 days due to INR> 3 earlier this week.  - INR today is 2.07,resume warfarin, request pharmacy to dose.     Hypertension:   Blood pressure is acceptable. Continue PTA losartan, with hold if systolic less than 100, as we are diuresing him.      SUSY on CPAP:  - continue.      Dyslipidemia:    Continue PTA atorvastatin.      History of GI bleed:   s/p endoscopy showing healed ulcers in 06/2018, currently asymptomatic, recently taken off PPI by PCP.    - Continue to monitor.      Chronic Leg wound:  - likely associated with ch edema. No sign of infection  -  Request [facility]      DVT prophylaxis: on  Coumadin.    CODE STATUS:  FULL CODE   DISPOSITION:  2-3 more days. PT to evaluate for discharge planning.         Rocio Sorto MD.  Hospitalist [phone] (7am -6 pm)                 Interval History:   Continued SOB. Cough /hemoptysis better.              Medications:       atorvastatin (LIPITOR) tablet 10 mg  10 mg Oral Daily     azithromycin  250 mg Oral Daily     cefTRIAXone  1 g Intravenous Q24H     furosemide  40 mg Intravenous Q12H     ipratropium - albuterol 0.5 mg/2.5 mg/3 mL  3 mL Nebulization 4x daily     losartan  25 mg Oral Daily     acetaminophen, albuterol, Continuing ACE inhibitor/ARB/ARNI from home medication list OR ACE inhibitor/ARB order already placed during  this visit, docusate sodium, HOLD MEDICATION, magnesium hydroxide, melatonin, naloxone, ondansetron **OR** ondansetron, - MEDICATION INSTRUCTIONS -, potassium chloride, potassium chloride with lidocaine, potassium chloride, potassium chloride, potassium chloride, Reason beta blocker order not selected, Warfarin Therapy Reminder               Physical Exam:   Blood pressure 135/62, pulse 65, temperature 98.5  F (36.9  C), temperature source Axillary, resp. rate 20, weight 78.9 kg (173 lb 15.1 oz), SpO2 96 %.  Wt Readings from Last 4 Encounters:   18 78.9 kg (173 lb 15.1 oz)   13 78.9 kg (173 lb 14.4 oz)         Vital Sign Ranges  Temperature Temp  Av  F (36.7  C)  Min: 97  F (36.1  C)  Max: 98.5  F (36.9  C)   Blood pressure Systolic (24hrs), Av , Min:110 , Max:135        Diastolic (24hrs), Av, Min:56, Max:82      Pulse Pulse  Av  Min: 65  Max: 65   Respirations Resp  Av.1  Min: 8  Max: 31   Pulse oximetry SpO2  Av.3 %  Min: 93 %  Max: 99 %         Intake/Output Summary (Last 24 hours) at 18 0804  Last data filed at 18 0657   Gross per 24 hour   Intake              625 ml   Output             2170 ml   Net            -1545 ml     Pt's daughter here to interpert ( pt does not speak English)  Constitutional: Awake, alert, cooperative, no apparent distress, although he feels SOB   Lungs: Clear to auscultation bilaterally, no crackles or wheezing    Cardiovascular: irregular rate and rhythm, normal S1 and S2, and no murmur noted   Abdomen: Obese, soft, non-distended, non-tender   Skin: No rashes, no cyanosis, no edema   Neuro:                Data:   All laboratory data reviewed

## 2018-09-28 NOTE — CONSULTS
Care Transition Initial Assessment - RN    Reason For Consult: care coordination/care conference, discharge planning   Met with: Patient, Family and , [place].  DATA   Active Problems:    CHF (congestive heart failure) (H)     CHF Action Plan in both English & Indonesian discussed with daughter & pt.   Cognitive Status: awake, alert and oriented.  Primary Care Clinic Name: [facility]  Primary Care MD Name: Daquan RUIZ  Contact information and PCP information verified: Yes  Lives With: child(fareed), adult, grandchild(fareed)  Living Arrangements: house     Description of Support System: Supportive, Involved   Who is your support system?: Children       Insurance concerns: Pt has completed application for EMA. No other insurance. Discussed with pt & dtr through .     ASSESSMENT  Patient currently receives the following services:  None. His daughter & grandchildren assist him as needed.         Identified issues/concerns regarding health management: Pt admits to indiscretions with low sodium diet. Discussed benefits of fresh/frozen vs prepackaged items. He has a working scale so educated pt/dtr on daily weights and what to watch for if retaining fluids.     PLAN  Financial costs for the patient include hospital stay & medications as he has no insurance.   Patient given options and choices for discharge.  Patient/family is agreeable to the plan?  Yes  Patient anticipates discharging back to daughter's house.     Other Resources: Other (see comment) (CHF Action Plan)  Patient anticipates needs for home equipment: No  Plan/Disposition: Home   Appointments: Recommended f/u appointment with pt's clinic after discharge. Daughter will make appointment.     CM will continue to follow patient until discharge for any additional needs.     Malu Mix, RN, BSN, CTS  [facility]  [phone]

## 2018-09-29 LAB
GRAM STN SPEC: NORMAL
INR PPP: 1.72 (ref 0.86–1.14)
Lab: NORMAL
SPECIMEN SOURCE: NORMAL

## 2018-09-29 PROCEDURE — 87070 CULTURE OTHR SPECIMN AEROBIC: CPT | Performed by: INTERNAL MEDICINE

## 2018-09-29 PROCEDURE — 40000275 ZZH STATISTIC RCP TIME EA 10 MIN

## 2018-09-29 PROCEDURE — 87205 SMEAR GRAM STAIN: CPT | Performed by: INTERNAL MEDICINE

## 2018-09-29 PROCEDURE — 25000132 ZZH RX MED GY IP 250 OP 250 PS 637: Performed by: INTERNAL MEDICINE

## 2018-09-29 PROCEDURE — 94640 AIRWAY INHALATION TREATMENT: CPT

## 2018-09-29 PROCEDURE — 25000125 ZZHC RX 250: Performed by: INTERNAL MEDICINE

## 2018-09-29 PROCEDURE — 25000128 H RX IP 250 OP 636: Performed by: INTERNAL MEDICINE

## 2018-09-29 PROCEDURE — 94640 AIRWAY INHALATION TREATMENT: CPT | Mod: 76

## 2018-09-29 PROCEDURE — 99233 SBSQ HOSP IP/OBS HIGH 50: CPT | Performed by: INTERNAL MEDICINE

## 2018-09-29 PROCEDURE — 36415 COLL VENOUS BLD VENIPUNCTURE: CPT | Performed by: INTERNAL MEDICINE

## 2018-09-29 PROCEDURE — 99222 1ST HOSP IP/OBS MODERATE 55: CPT | Performed by: INTERNAL MEDICINE

## 2018-09-29 PROCEDURE — 12000007 ZZH R&B INTERMEDIATE

## 2018-09-29 PROCEDURE — 85610 PROTHROMBIN TIME: CPT | Performed by: INTERNAL MEDICINE

## 2018-09-29 RX ORDER — WARFARIN SODIUM 3 MG/1
6 TABLET ORAL
Status: COMPLETED | OUTPATIENT
Start: 2018-09-29 | End: 2018-09-29

## 2018-09-29 RX ORDER — FUROSEMIDE 40 MG
80 TABLET ORAL
Status: DISCONTINUED | OUTPATIENT
Start: 2018-09-29 | End: 2018-09-30 | Stop reason: HOSPADM

## 2018-09-29 RX ADMIN — ALBUTEROL SULFATE 2.5 MG: 2.5 SOLUTION RESPIRATORY (INHALATION) at 01:58

## 2018-09-29 RX ADMIN — WARFARIN SODIUM 6 MG: 3 TABLET ORAL at 16:02

## 2018-09-29 RX ADMIN — FUROSEMIDE 80 MG: 40 TABLET ORAL at 15:53

## 2018-09-29 RX ADMIN — LOSARTAN POTASSIUM 25 MG: 25 TABLET ORAL at 08:28

## 2018-09-29 RX ADMIN — ALBUTEROL SULFATE 2.5 MG: 2.5 SOLUTION RESPIRATORY (INHALATION) at 20:43

## 2018-09-29 RX ADMIN — ATORVASTATIN CALCIUM 10 MG: 10 TABLET, FILM COATED ORAL at 08:28

## 2018-09-29 RX ADMIN — ALBUTEROL SULFATE 2.5 MG: 2.5 SOLUTION RESPIRATORY (INHALATION) at 07:23

## 2018-09-29 RX ADMIN — FUROSEMIDE 40 MG: 10 INJECTION, SOLUTION INTRAVENOUS at 06:02

## 2018-09-29 RX ADMIN — ALBUTEROL SULFATE 2.5 MG: 2.5 SOLUTION RESPIRATORY (INHALATION) at 16:13

## 2018-09-29 RX ADMIN — GUAIFENESIN 10 ML: 100 SOLUTION ORAL at 06:21

## 2018-09-29 RX ADMIN — GUAIFENESIN 10 ML: 100 SOLUTION ORAL at 16:02

## 2018-09-29 RX ADMIN — ACETAMINOPHEN 650 MG: 325 TABLET, FILM COATED ORAL at 20:02

## 2018-09-29 RX ADMIN — GUAIFENESIN 10 ML: 100 SOLUTION ORAL at 01:38

## 2018-09-29 RX ADMIN — AZITHROMYCIN MONOHYDRATE 250 MG: 250 TABLET ORAL at 08:28

## 2018-09-29 RX ADMIN — SILVER SULFADIAZINE: 10 CREAM TOPICAL at 13:15

## 2018-09-29 RX ADMIN — Medication: at 19:58

## 2018-09-29 RX ADMIN — ALBUTEROL SULFATE 2.5 MG: 2.5 SOLUTION RESPIRATORY (INHALATION) at 06:09

## 2018-09-29 RX ADMIN — GUAIFENESIN 10 ML: 100 SOLUTION ORAL at 20:05

## 2018-09-29 RX ADMIN — ALBUTEROL SULFATE 2.5 MG: 2.5 SOLUTION RESPIRATORY (INHALATION) at 11:43

## 2018-09-29 RX ADMIN — CEFTRIAXONE SODIUM 1 G: 1 INJECTION, POWDER, FOR SOLUTION INTRAMUSCULAR; INTRAVENOUS at 13:15

## 2018-09-29 RX ADMIN — PREDNISONE 20 MG: 20 TABLET ORAL at 08:28

## 2018-09-29 RX ADMIN — ACETAMINOPHEN 650 MG: 325 TABLET, FILM COATED ORAL at 06:21

## 2018-09-29 ASSESSMENT — ACTIVITIES OF DAILY LIVING (ADL)
ADLS_ACUITY_SCORE: 15
ADLS_ACUITY_SCORE: 15
ADLS_ACUITY_SCORE: 16
ADLS_ACUITY_SCORE: 16
ADLS_ACUITY_SCORE: 15
ADLS_ACUITY_SCORE: 15

## 2018-09-29 NOTE — CONSULTS
Consult Date:  09/29/2018      CARDIOLOGY CONSULTATION       REQUESTING PHYSICIAN:  Dr. Leon for CHF exacerbation.        CHIEF COMPLAINT:  Fatigue, dyspnea on exertion, cough with sputum production and hemoptysis.      HISTORY OF PRESENT ILLNESS:  Mr. Ivan Samuel is a very pleasant 83-year-old gentleman originally from [facility] who has been visiting his daughter for the last 2 months in [place] with history of chronic atrial fibrillation, history of pacemaker implantation 10 years ago indication not known, on chronic Coumadin therapy, diagnosis of COPD made about a year ago, hypertension, obstructive sleep apnea on CPAP, dyslipidemia, chronic lower extremity wounds, who is admitted with complaints of fatigue, cough with sputum and hemoptysis and dyspnea on exertion  An official  is used for this review.        The patient tells me that for the last several months he has been having dyspnea on exertion, in fact it dates back to about a year ago when he was diagnosed with COPD.  The patient denies any chest discomfort.  He denies orthopnea, although his daughter tells me that he sometimes complains of shortness of breath when lying down, although through  he denied that.  The main issue is that he gets fatigued even at rest.  He also gets dyspnea on exertion which is longstanding for the last several months.  He recently had a visit to the ER and was diagnosed with CHF, started on Lasix 80 mg b.i.d.  This was decreased to 40 mg b.i.d.  He had some weight gain in response to that.  Chest x-ray is suggestive of possible consolidation.  He has been started on antibiotics for possible community-acquired pneumonia.        Echocardiogram done yesterday showed normal LV and RV systolic function, severe biatrial enlargement.  The E/e' prime ratio was in an indeterminate to evaluate left sided filling pressures,  IVC was normal.  No significant valvular disease.  Pulmonary hypertension  was noted with RVSP of 47 mmHg plus RA.  EKG showed atrial fibrillation with intermittent ventricular pacing.  He was started on IV Lasix and so far had good diuresis.  He has lost about 4-7 pounds.      REVIEW OF SYSTEMS:  A complete review of systems was performed and was negative except in HPI.      PAST MEDICAL HISTORY:   1.  COPD diagnosed a year ago in [facility].   2.  Chronic atrial fibrillation on Coumadin.  His ventricular rates appear to be well controlled without any need for AV blocking agents.   3.  History of pacemaker implantation 10 years ago.  The patient tells me that he recently had a checkup  regarding his heart and pacemaker in [facility] and it was normal.   4.  Hypertension.   5.  Obstructive sleep apnea on CPAP.      FAMILY HISTORY:  Reviewed and noncontributory.      SOCIAL HISTORY:  He quit tobacco about 40 years ago.      CURRENT MEDICATIONS:  Lasix 40 mg IV b.i.d., Lipitor 10 mg daily, ceftriaxone, azithromycin, Coumadin, prednisone, losartan.      PHYSICAL EXAMINATION:   VITAL SIGNS:  Blood pressure 112/60, heart rate 69 and irregular, respiratory rate 20, 95% on room air.   GENERAL:  The patient appears pleasant, comfortable.   NECK:  JVP appears normal, negative hepatojugular reflux.   CARDIOVASCULAR:  Irregular, no murmur, rub or gallop.   RESPIRATORY:  A few crackles and decreased bilateral air entry and rhonchi.   ABDOMEN:  Soft, nontender.   EXTREMITIES:  Minimal pitting pedal edema.   PSYCHIATRIC:  Normal affect.   SKIN:  No obvious rash.   NEUROLOGIC:  Negative.   HEENT:  No pallor or icterus.      LABORATORY:  NT-proBNP 1421 with upper normal limits of 1800.  Troponin less than 0.015.  Hemoglobin 11.5, platelets 230.  BMP essentially normal.  INR 2.07 at admission.        Chest x-ray shows mild basilar atelectasis, consolidation, may represent aspiration pneumonia.  Trace bilateral pleural effusion.        EKG as noted above.        Echocardiogram as noted above.      IMPRESSION: AND  PLAN:  A pleasant 83-year-old gentleman originally from [facility] with history of chronic atrial fibrillation status post pacemaker implantation, chronic obstructive pulmonary disease, obstructive sleep apnea, who is now admitted with cough with sputum production with hemoptysis with longstanding dyspnea on exertion.  Echocardiogram showed normal LV and RV systolic function.  There is evidence of pulmonary hypertension.  IVC was normal.  Left-sided filling pressures were indeterminate.  Today on my evaluation, there was bilateral decreased air entry and rhonchi on auscultation.  My feeling is that part if not predominant symptoms are due to underlying lung issues, COPD, possible COPD exacerbation and pneumonia.  At this time, he appears pretty close to euvolemic and I recommend that he can be switched to oral Lasix.  I strongly recommended the patient to have a formal pulmonary evaluation with PFTs as he tells me that inhalers make him feel better regarding dyspnea on exertion.  He may have some element of preserved ejection congestive heart failure, although proBNP is not beyond the normal limits.  There is evidence of pulmonary hypertension, likely because of underlying COPD.   1.  Symptoms of dyspnea on exertion for several months, now with cough with sputum production with possible hemoptysis.  Overall, symptoms feel pulmonary etiology than  cardiac etiology at this time.  There may be some element of preserved ejection congestive heart failure, but as of now he appears pretty close to euvolemic.   2.  Chronic atrial fibrillation.  Ventricular rates well controlled.  CHADS2-VASc score of 3, on Coumadin for stroke prophylaxis.   3.  Status post placement of permanent pacemaker implantation 10 years ago, indication not known.  According to patient, was recently checked in [facility] and was told to be normal.   4.  Obstructive sleep apnea on CPAP.   5.  COPD.      RECOMMENDATIONS:   1.  Agree with diuretics can be  switched to oral 80 mg b.i.d. of Lasix.   2.  Recommend formal pulmonary workup including PFTs as I feel part of the symptoms, if not predominantly his symptoms may be from underlying lung issues.   3.  Routine CHF cares of salt-restricted diet, daily weights and strict I's  and O's.      Thank you for involving me in the care of Mr. Love.  Cardiology will sign off at this time, but please feel free to call with any questions.         MANGO WILSON MD             D: 2018   T: 2018   MT: KYLIE      Name:     MONSERRAT MILLER   MRN:      [id number]        Account:       [id number]   :      1935           Consult Date:  2018      Document: [id number]       cc: [facility]

## 2018-09-29 NOTE — PLAN OF CARE
Problem: Cardiac: Heart Failure (Adult)  Intervention: Gradually Correct Positive Fluid Balance  PRIMARY DIAGNOSIS: CONGESTIVE HEART FAILURE  OUTPATIENT/OBSERVATION GOALS TO BE MET BEFORE DISCHARGE:  1. Dyspnea improved and O2 sats >88% at RA or at prior home O2 therapy level: Yes        SpO2: 96 %, O2 Device: None (Room air)  Vitals:    09/27/18 1633 09/28/18 0647   Weight: 81.6 kg (180 lb) 78.9 kg (173 lb 15.1 oz)        2. ECHO and other diagnostic testing complete (if applicable): Yes    3. Return to near baseline physical activity: Yes    Discharge Planner Nurse   Safe discharge environment identified: Yes  Barriers to discharge: Yes       Entered by: Alessandra Ayala 09/29/2018 4:35 AM    /80 (BP Location: Right arm)  Pulse 69  Temp 97.8  F (36.6  C) (Oral)  Resp 16  Wt 78.9 kg (173 lb 15.1 oz)  SpO2 96%  BMI 31.81 kg/m2  On Lasix IV. SOB, coughing, congested. Paged for cough med and gave prn cough med, RT gave neb. Daughter in room assisting pt. Pt does not speak english. Pt voiding less. PM shift held lasix d/t low BP, has another dose at 0500. On Rocephin for possible pneumonia. Has wound on right leg that wound nurse has new dress which is CDI.  ON Tele A-Fib CVR. Will monitor per POC.  Please review provider order for any additional goals.   Nurse to notify provider when observation goals have been met and patient is ready for discharge.

## 2018-09-29 NOTE — PLAN OF CARE
Problem: Cardiac: Heart Failure (Adult)  Goal: Signs and Symptoms of Listed Potential Problems Will be Absent, Minimized or Managed (Cardiac: Heart Failure)  Signs and symptoms of listed potential problems will be absent, minimized or managed by discharge/transition of care (reference Cardiac: Heart Failure (Adult) CPG).   Outcome: Improving  Heart Failure Care Pathway  GOALS TO BE MET BEFORE DISCHARGE:    1. Decrease congestion and/or edema with diuretic therapy to achieve near      optimal volume status.            Dyspnea improved:  Yes- is at RA- USING  IS to IS, sent sputum for cult.             Edema improved:     Yes, pt/family state much improved since admit - wt down about 8 lbs  Since admit         Net I/O and Weights since admission:          08/30 2300 - 09/29 2259  In: 2085 [P.O.:2085]  Out: 4520 [Urine:4520]  Net: -2435            Vitals:    09/27/18 1633 09/28/18 0647 09/29/18 0621   Weight: 81.6 kg (180 lb) 78.9 kg (173 lb 15.1 oz) 80 kg (176 lb 5.9 oz)       2.  O2 sats > 92% on RA or at prior home O2 therapy level.          Current oxygenation status:       SpO2: 97 %         O2 Device: None (Room air),            Able to wean O2 this shift to keep sats > 92%:  Yes       Does patient use Home O2? No    3.  Tolerates ambulation and mobility near baseline: Yes        How many times did the patient ambulate with nursing staff this shift? 2    Please review the Heart Failure Care Pathway for additional HF goal outcomes.  DX:  CHF/Pneumonia on underlying COPD,  HX: COPD, SUSY -CPAP, Vent Pacer ,afib -on coumadin ,rt shoulder pain ,HTN  ASSESS: Up in room with SBA, daughter at bedside. Using IS to 1000. Able to cough up tan/pinkish sputum>sent for cult.  TELE: chronic afib- occ.vent paced beats.  TEACHING: gave handouts to pt/eriker  On all meds--pt speaks U[place] & daughter is one who sets up/gives meds  Will give CORE book & discuss over W/E  SOCIAL: here from [place]- living with Daughter in  [place].   PLAN: Establishing  Care with free clinic in [facility]. Care Cord.& cardiology consulting . Diuresis , Wound cares to chronic rt  Shin wound, [facility] CLINIC at discharge , on Rocephin & Rogers ( pneumonia )      Nia Desai RN  9/29/2018

## 2018-09-29 NOTE — PROGRESS NOTES
[facility]  Hospitalist Progress Note for 9/28/2018  Mr. Ivan Samuel is an 83-year-old gentleman who is visiting this country from [facility] since the past 3 months.  He has a history of COPD, atrial fibrillation on chronic anticoagulation, status post pacemaker, congestive heart failure, chronic leg swelling, hypertension, obstructive sleep apnea on CPAP.  He was last seen a month ago for increased leg edema and diagnosed with congestive heart failure and advised to increase dose of Lasix until about 2 weeks ago.  He now presents with increased shortness of breath, cough and bloody phlegm.      1. Acute exacerbation of chronic congestive heart failure:  -- increased symptoms of SOB, cough & hemoptysis. Question of dietary noncompliance.   --ProBNP is elevated. EKG shows chronic atrial fibrillation with a controlled ventricular rate. Troponin is normal.    --Possible underling deconditioning,? Cardiomyopathy,COPD  --Continue current IV Lasix 40 mg twice daily. Renal function stable  --Monitor input/output, daily weight, BMP.  --CORE clinic recommending cardiology consult.  Consult placed.     2. Possible bilateral community-acquired pneumonia:   COPD, with mild exacerbation due to CHF:   --chest x-ray shows bibasilar atelectasis or consolidation. Afebrile, nl WBC.  - continue AB started in the emergency room with IV Rocephin and PTA  azithromycin and complete the course.  - continue DuoNeb 4 times a day. Albuterol neb prn  - continue PTA fluticasone salmeterol inhaler, hold the Spiriva for now while on Neb  -Continue steroids to complete 5-day course       3. Hemoptysis:    Chronic dyspnea, possible deconditioning:  --Likely multifactorial including CHF, pneumonia, COPD  --treat as above ,monitor. Resume PTA warfarin as INR today 2.07 (goal INR 2-2.5).  --Given his history of tobacco use, concern for possible underlying cancer is always a possibility.    --Continue treatment for pneumonia.    4.  Chronic atrial fibrillation with a controlled ventricular rate ,s/p PPM:   --On chronic warfarin, dose held x 2 days due to INR> 3 earlier this week.  --INR today is 2.07,resume warfarin, request pharmacy to dose.    5. Hypertension:   --Blood pressure is acceptable. Continue PTA losartan, with hold if systolic less than 100, as we are diuresing him.      6. SUSY on CPAP:  - continue.      7. Dyslipidemia:    Continue PTA atorvastatin.      8. History of GI bleed:   s/p endoscopy showing healed ulcers in 06/2018, currently asymptomatic, recently taken off PPI by PCP.    - Continue to monitor.      9. Chronic Leg wound:  - likely associated with ch edema. No sign of infection  -  Request WOC      DVT prophylaxis: on  Coumadin.    CODE STATUS:  FULL CODE   DISPOSITION: anticipate discharge in 1-2 days if he continues to improve          Interval History:   Admitted for shortness of breath. He has no fever. He denies chest pain or fever.        Medications:       albuterol  2.5 mg Nebulization 4x Daily     atorvastatin (LIPITOR) tablet 10 mg  10 mg Oral Daily     azithromycin  250 mg Oral Daily     cefTRIAXone  1 g Intravenous Q24H     furosemide  80 mg Oral BID     losartan  25 mg Oral Daily     predniSONE  20 mg Oral Daily     silver sulfADIAZINE   Topical Daily     umeclidinium  1 puff Inhalation Daily     acetaminophen, albuterol, Continuing ACE inhibitor/ARB/ARNI from home medication list OR ACE inhibitor/ARB order already placed during this visit, docusate sodium, guaiFENesin, magnesium hydroxide, melatonin, menthol (Topical Analgesic) 2.5%, naloxone, ondansetron **OR** ondansetron, - MEDICATION INSTRUCTIONS -, potassium chloride, potassium chloride with lidocaine, potassium chloride, potassium chloride, potassium chloride, Reason beta blocker order not selected, Warfarin Therapy Reminder               Physical Exam:   Blood pressure 112/60, pulse 69, temperature 98.7  F (37.1  C), temperature source Oral, resp.  rate 20, weight 80 kg (176 lb 5.9 oz), SpO2 95 %.  Wt Readings from Last 4 Encounters:   18 80 kg (176 lb 5.9 oz)   13 78.9 kg (173 lb 14.4 oz)         Vital Sign Ranges  Temperature Temp  Av  F (36.7  C)  Min: 97  F (36.1  C)  Max: 98.5  F (36.9  C)   Blood pressure Systolic (24hrs), Av , Min:110 , Max:135        Diastolic (24hrs), Av, Min:56, Max:82      Pulse Pulse  Av  Min: 65  Max: 65   Respirations Resp  Av.1  Min: 8  Max: 31   Pulse oximetry SpO2  Av.3 %  Min: 93 %  Max: 99 %         Intake/Output Summary (Last 24 hours) at 18 0804  Last data filed at 18 0657   Gross per 24 hour   Intake              625 ml   Output             2170 ml   Net            -1545 ml     Pt's daughter here to interpert ( pt does not speak English)  Constitutional: Awake, alert, cooperative, no apparent distress, although he feels SOB   Lungs: Clear to auscultation bilaterally, no crackles or wheezing    Cardiovascular: irregular rate and rhythm, normal S1 and S2, and no murmur noted   Abdomen: Obese, soft, non-distended, non-tender   Skin: No rashes, no cyanosis, no edema   Neuro:                Data:   All laboratory data reviewed

## 2018-09-29 NOTE — PLAN OF CARE
Problem: Cardiac: Heart Failure (Adult)  Goal: Signs and Symptoms of Listed Potential Problems Will be Absent, Minimized or Managed (Cardiac: Heart Failure)  Signs and symptoms of listed potential problems will be absent, minimized or managed by discharge/transition of care (reference Cardiac: Heart Failure (Adult) CPG).   Outcome: Improving  Heart Failure Care Pathway  GOALS TO BE MET BEFORE DISCHARGE:    1. Decrease congestion and/or edema with diuretic therapy to achieve near      optimal volume status.            Dyspnea improved:  Yes            Edema improved:     Yes        Net I/O and Weights since admission:          08/29 2300 - 09/28 2259  In: 1325 [P.O.:1325]  Out: 3370 [Urine:3370]  Net: -2045            Vitals:    09/27/18 1633 09/28/18 0647   Weight: 81.6 kg (180 lb) 78.9 kg (173 lb 15.1 oz)       2.  O2 sats > 92% on RA or at prior home O2 therapy level.          Current oxygenation status:       SpO2: 95 %         O2 Device: None (Room air),            Able to wean O2 this shift to keep sats > 92%:  Yes       Does patient use Home O2? No    3.  Tolerates ambulation and mobility near baseline: Yes        How many times did the patient ambulate with nursing staff this shift? 1    Please review the Heart Failure Care Pathway for additional HF goal outcomes.    Pt AOx4. Cymraes speaking - daughter at beside. Tranfers SBA. Tolerating 2gm diet  And 1500 FR well. Uses urinal at bedside. Held IV lasix d/t soft BP. Tele: a.fib CVR. LS crackles to bilateral bases. IV to right AC saline locked. Dressing to RLE intact. 1+ edema to BLE. PT signed off today - mobility at baseline. WOC following. Plan: Continue to diuresis w/ IV lasix. Discharge home w/ daughter in 2-3 days.     Mari Hull RN  9/28/2018

## 2018-09-30 VITALS
HEART RATE: 69 BPM | BODY MASS INDEX: 32.34 KG/M2 | TEMPERATURE: 98.1 F | SYSTOLIC BLOOD PRESSURE: 117 MMHG | RESPIRATION RATE: 20 BRPM | OXYGEN SATURATION: 98 % | DIASTOLIC BLOOD PRESSURE: 63 MMHG | WEIGHT: 176.81 LBS

## 2018-09-30 LAB
ANION GAP SERPL CALCULATED.3IONS-SCNC: 6 MMOL/L (ref 3–14)
BASOPHILS # BLD AUTO: 0.1 10E9/L (ref 0–0.2)
BASOPHILS NFR BLD AUTO: 0.7 %
BUN SERPL-MCNC: 17 MG/DL (ref 7–30)
CALCIUM SERPL-MCNC: 8.3 MG/DL (ref 8.5–10.1)
CHLORIDE SERPL-SCNC: 103 MMOL/L (ref 94–109)
CO2 SERPL-SCNC: 28 MMOL/L (ref 20–32)
CREAT SERPL-MCNC: 0.79 MG/DL (ref 0.66–1.25)
DIFFERENTIAL METHOD BLD: ABNORMAL
EOSINOPHIL # BLD AUTO: 0.3 10E9/L (ref 0–0.7)
EOSINOPHIL NFR BLD AUTO: 3.5 %
ERYTHROCYTE [DISTWIDTH] IN BLOOD BY AUTOMATED COUNT: 13.8 % (ref 10–15)
GFR SERPL CREATININE-BSD FRML MDRD: >90 ML/MIN/1.7M2
GLUCOSE SERPL-MCNC: 80 MG/DL (ref 70–99)
HCT VFR BLD AUTO: 39.3 % (ref 40–53)
HGB BLD-MCNC: 12.4 G/DL (ref 13.3–17.7)
IMM GRANULOCYTES # BLD: 0 10E9/L (ref 0–0.4)
IMM GRANULOCYTES NFR BLD: 0.4 %
INR PPP: 2 (ref 0.86–1.14)
LYMPHOCYTES # BLD AUTO: 1.9 10E9/L (ref 0.8–5.3)
LYMPHOCYTES NFR BLD AUTO: 26.6 %
MCH RBC QN AUTO: 29.7 PG (ref 26.5–33)
MCHC RBC AUTO-ENTMCNC: 31.6 G/DL (ref 31.5–36.5)
MCV RBC AUTO: 94 FL (ref 78–100)
MONOCYTES # BLD AUTO: 1.1 10E9/L (ref 0–1.3)
MONOCYTES NFR BLD AUTO: 15.4 %
NEUTROPHILS # BLD AUTO: 3.9 10E9/L (ref 1.6–8.3)
NEUTROPHILS NFR BLD AUTO: 53.4 %
NRBC # BLD AUTO: 0 10*3/UL
NRBC BLD AUTO-RTO: 0 /100
PLATELET # BLD AUTO: 234 10E9/L (ref 150–450)
POTASSIUM SERPL-SCNC: 4.1 MMOL/L (ref 3.4–5.3)
RBC # BLD AUTO: 4.18 10E12/L (ref 4.4–5.9)
SODIUM SERPL-SCNC: 137 MMOL/L (ref 133–144)
WBC # BLD AUTO: 7.2 10E9/L (ref 4–11)

## 2018-09-30 PROCEDURE — 25000125 ZZHC RX 250: Performed by: INTERNAL MEDICINE

## 2018-09-30 PROCEDURE — 25000132 ZZH RX MED GY IP 250 OP 250 PS 637: Performed by: INTERNAL MEDICINE

## 2018-09-30 PROCEDURE — 94640 AIRWAY INHALATION TREATMENT: CPT | Mod: 76

## 2018-09-30 PROCEDURE — 99239 HOSP IP/OBS DSCHRG MGMT >30: CPT | Performed by: INTERNAL MEDICINE

## 2018-09-30 PROCEDURE — 90662 IIV NO PRSV INCREASED AG IM: CPT | Performed by: INTERNAL MEDICINE

## 2018-09-30 PROCEDURE — 25000128 H RX IP 250 OP 636: Performed by: INTERNAL MEDICINE

## 2018-09-30 PROCEDURE — 94640 AIRWAY INHALATION TREATMENT: CPT

## 2018-09-30 PROCEDURE — 40000275 ZZH STATISTIC RCP TIME EA 10 MIN

## 2018-09-30 PROCEDURE — 85610 PROTHROMBIN TIME: CPT | Performed by: INTERNAL MEDICINE

## 2018-09-30 PROCEDURE — 80048 BASIC METABOLIC PNL TOTAL CA: CPT | Performed by: INTERNAL MEDICINE

## 2018-09-30 PROCEDURE — 36415 COLL VENOUS BLD VENIPUNCTURE: CPT | Performed by: INTERNAL MEDICINE

## 2018-09-30 PROCEDURE — 85025 COMPLETE CBC W/AUTO DIFF WBC: CPT | Performed by: INTERNAL MEDICINE

## 2018-09-30 RX ORDER — FUROSEMIDE 80 MG
80 TABLET ORAL
Qty: 30 TABLET | Refills: 0 | Status: ON HOLD | OUTPATIENT
Start: 2018-09-30 | End: 2021-05-26

## 2018-09-30 RX ORDER — CEPHALEXIN 500 MG/1
500 CAPSULE ORAL 2 TIMES DAILY
Qty: 6 CAPSULE | Refills: 0 | Status: SHIPPED | OUTPATIENT
Start: 2018-09-30 | End: 2018-10-03

## 2018-09-30 RX ORDER — WARFARIN SODIUM 2.5 MG/1
2.5 TABLET ORAL
Status: DISCONTINUED | OUTPATIENT
Start: 2018-09-30 | End: 2018-09-30 | Stop reason: HOSPADM

## 2018-09-30 RX ORDER — PREDNISONE 20 MG/1
20 TABLET ORAL DAILY
Qty: 3 TABLET | Refills: 0 | Status: SHIPPED | OUTPATIENT
Start: 2018-10-01 | End: 2018-10-04

## 2018-09-30 RX ADMIN — FUROSEMIDE 80 MG: 40 TABLET ORAL at 08:51

## 2018-09-30 RX ADMIN — ALBUTEROL SULFATE 2.5 MG: 2.5 SOLUTION RESPIRATORY (INHALATION) at 07:19

## 2018-09-30 RX ADMIN — ALBUTEROL SULFATE 2.5 MG: 2.5 SOLUTION RESPIRATORY (INHALATION) at 11:13

## 2018-09-30 RX ADMIN — INFLUENZA A VIRUS A/MICHIGAN/45/2015 X-275 (H1N1) ANTIGEN (FORMALDEHYDE INACTIVATED), INFLUENZA A VIRUS A/SINGAPORE/INFIMH-16-0019/2016 IVR-186 (H3N2) ANTIGEN (FORMALDEHYDE INACTIVATED), AND INFLUENZA B VIRUS B/MARYLAND/15/2016 BX-69A (A B/COLORADO/6/2017-LIKE VIRUS) ANTIGEN (FORMALDEHYDE INACTIVATED) 0.5 ML: 60; 60; 60 INJECTION, SUSPENSION INTRAMUSCULAR at 11:27

## 2018-09-30 RX ADMIN — PREDNISONE 20 MG: 20 TABLET ORAL at 08:51

## 2018-09-30 RX ADMIN — GUAIFENESIN 10 ML: 100 SOLUTION ORAL at 02:48

## 2018-09-30 RX ADMIN — LOSARTAN POTASSIUM 25 MG: 25 TABLET ORAL at 08:52

## 2018-09-30 RX ADMIN — AZITHROMYCIN MONOHYDRATE 250 MG: 250 TABLET ORAL at 08:51

## 2018-09-30 RX ADMIN — ATORVASTATIN CALCIUM 10 MG: 10 TABLET, FILM COATED ORAL at 08:51

## 2018-09-30 RX ADMIN — SILVER SULFADIAZINE: 10 CREAM TOPICAL at 11:27

## 2018-09-30 RX ADMIN — GUAIFENESIN 10 ML: 100 SOLUTION ORAL at 08:54

## 2018-09-30 ASSESSMENT — ACTIVITIES OF DAILY LIVING (ADL)
ADLS_ACUITY_SCORE: 15

## 2018-09-30 NOTE — PROGRESS NOTES
Hand-off for Care Transitions to Next Level of Care Provider  Name: Thierry aSmuel  : 1935  MRN #: [id number]  Reason for Hospitalization:  Cough [R05]  SOB (shortness of breath) [R06.02]  Generalized muscle weakness [M62.81]  COPD exacerbation (H) [J44.1]  Failure of outpatient treatment [Z78.9]  Pneumonia of both lower lobes due to infectious organism [J18.9]  Admit Date/Time: 2018 10:29 AM  Discharge Date: 2018    Reason for Communication Hand-off Referral: Admission diagnoses: CHF    Discharge Plan:  Discharged to: Home with support                   Patient agreeable to post-hospital support suggestions:  Yes    Patient is on new medications:   Yes    MTM follow up recommended: No    Tel-Assurance program:  Already recommended a TA program    Follow-up specialty is recommended: Yes.  Follow up with [facility] as scheduled. Also, follow up with [facility] enrollment as scheduled.     Follow-up plan:  Future Appointments  Date Time Provider Department Center   10/5/2018 7:30 AM COTTRE LAB SULAB [facility] [place] [facility]   10/5/2018 8:10 AM Carrie De Los Santos PA-C [facility]   10/24/2018 8:15 AM RU LAB RULAB [facility] [place] [facility]   10/24/2018 9:45 AM Deion Huffman MD [facility]     Any outstanding tests or procedures:  No. Recommend a repeat INR in 2-3 days.       Key Recommendations:  Pt admits to indiscretions with low sodium diet. Discussed benefits of fresh/frozen vs prepackaged items. He has a working scale so educated pt/dtr on daily weights and what to watch for if retaining fluids. He would benefit in reinforcement of participating at [facility].     Communicated handoff via [employer] to [facility]'s CC at [phone].     Malu Mix RN, BSN, CTS  [facility]  [phone]

## 2018-09-30 NOTE — PHARMACY-ANTICOAGULATION SERVICE
Clinical Pharmacy- Warfarin Discharge Note  This patient is currently on warfarin for the treatment of Atrial fibrillation.  INR Goal= 2-2.5  Expected length of therapy undetermined.    Warfarin PTA Regimen:  5 mg on Mon, Tues, Thurs and Fri and 2.5 mg on Sun, Wed, Sat.      Anticoagulation Dose History     Recent Dosing and Labs Latest Ref Rng & Units 8/15/2013 8/20/2013 8/9/2018 9/27/2018 9/28/2018 9/29/2018 9/30/2018    Warfarin 2.5 mg - - - - 2.5 mg - - -    Warfarin 3 mg - - - - - - 6 mg -    Warfarin 5 mg - - - - - 5 mg - -    INR 0.86 - 1.14 1.75(H) - 1.57(H) 2.07(H) 1.71(H) 1.72(H) 2.00(H)    INR 0.86 - 1.14 - 3.0(A)  - - - - -          Agree with discharge orders to continue home regimen and to have INR checked in 2 to 3 days.

## 2018-09-30 NOTE — DISCHARGE SUMMARY
[facility]    Discharge Summary  Hospitalist    Date of Admission:  9/27/2018  Date of Discharge:  9/30/2018  Discharging Provider: Dane Leon MD  Date of Service (when I saw the patient): 09/30/18    Discharge Diagnoses      1. Acute exacerbation of chronic congestive heart failure      2. Possible bilateral community-acquired pneumonia    3.  COPD, with mild exacerbation due to CHF     4. . Hemoptysis    5. Chronic dyspnea, possible deconditioning     6. Chronic atrial fibrillation with a controlled ventricular rate ,s/p PPM     7. Hypertension    8. SUSY on CPAP     9. Dyslipidemia     10. History of GI bleed     11. Chronic Leg wound     History of Present Illness   Thierry Samuel is an 83 year old male who presented with shortness of breath. Please see H&P and for details.     Hospital Course   Mr. Ivan Samuel is an 83-year-old gentleman who is visiting this country from [place] since the past 3 months.  He has a history of COPD, atrial fibrillation on chronic anticoagulation, status post pacemaker, congestive heart failure, chronic leg swelling, hypertension, obstructive sleep apnea on CPAP.  He was last seen a month ago for increased leg edema and diagnosed with congestive heart failure and advised to increase dose of Lasix until about 2 weeks ago.  He presented  with increased shortness of breath, cough and bloody phlegm.  Per his daughter, patient has chronic cough with phlegm production due to his COPD.  At times he is sputum is blood-tinged, per his daughter.  Patient had no fever      1. Acute exacerbation of chronic congestive heart failure:  Patient had symptoms of SOB, cough & hemoptysis. Question of dietary noncompliance.ProBNP is elevated. EKG shows chronic atrial fibrillation with a controlled ventricular rate. Troponin is normal.  Possible underling deconditioning,? Cardiomyopathy,COPD  Patient was initially put on  IV Lasix 40 mg twice daily. Cardiology was  consulted and his Lasix was changed to 80 mg p.o. twice daily.  Patient remained stable. His breathing improved. His leg swelling also improved.      2. Possible bilateral community-acquired pneumonia:   COPD, with mild exacerbation due to CHF:   Chest x-ray shows bibasilar atelectasis or consolidation. Afebrile, nl WBC.  Patient was put on IV Rocephin.  He was also continued on p.o. azithromycin to complete course of treatment.  He was given DuoNeb 4 times daily and as needed albuterol.  He was placed on short course of oral prednisone.  His symptoms improved significantly.  On discharge, he was put on oral Keflex to complete 7-day course of treatment.  He was advised to follow-up with pulmonary as outpatient      3. Hemoptysis:    Chronic dyspnea, possible deconditioning:  --Likely multifactorial including CHF, pneumonia, COPD.  Pharmacy was consulted for Coumadin dosing.  --Given his history of tobacco use, concern for possible underlying cancer is always a possibility.    --He was treated for pneumonia.  He was advised to follow-up with pulmonary as outpatient for his chronic dyspnea.     4. Chronic atrial fibrillation with a controlled ventricular rate ,s/p PPM:   --On chronic warfarin, dose held x 2 days due to INR> 3 earlier this week.  --He was continued on Coumadin during hospital course.  On discharge, he was advised to follow-up with his primary care physician in a week for further Coumadin dosing and INR monitoring.     5. Hypertension:   --Blood pressure is acceptable. Continue PTA losartan, with hold if systolic less than 100, as we are diuresing him.       6. SUSY on CPAP:  - continued on CPAP.       7. Dyslipidemia:     Continued on PTA atorvastatin.       8. History of GI bleed:   s/p endoscopy showing healed ulcers in 06/2018, currently asymptomatic, recently taken off PPI by PCP.        9. Chronic Leg wound:  - likely associated with ch edema. No sign of infection  -  Request WOC      Significant  Results and Procedures   No results found for this or any previous visit (from the past 24 hour(s)).    Pending Results   None  Code Status   Full Code       Primary Care Physician   [facility]    Discharge Disposition   Discharged to home  Condition at discharge: Stable    Consultations This Hospital Stay   [facility] EVALUATION IP CONSULT  CARDIAC REHAB IP CONSULT  CARE COORDINATOR IP CONSULT  NUTRITION SERVICES ADULT IP CONSULT  PHYSICAL THERAPY ADULT IP CONSULT  PHARMACY TO DOSE MEDICATION  PHARMACY TO DOSE WARFARIN  WOUND OSTOMY CONTINENCE NURSE  IP CONSULT  SOCIAL WORK IP CONSULT  CARDIOLOGY IP CONSULT    Time Spent on this Encounter   I, Dane Leon MD, personally saw the patient today and spent greater than 30 minutes discharging this patient.    Discharge Orders     Reason for your hospital stay   Acute on chronic diastolic CHF exacerbation     Activity   Your activity upon discharge: activity as tolerated     Follow-up and recommended labs and tests    Follow up with primary care provider, [facility], within 7 days  Outpatient pulmonary evaluation  Follow up INR in 2-3 days  Follow up with [facility] in two weeks     Diet   Follow this diet upon discharge: Orders Placed This Encounter     Fluid restriction 1500 ML FLUID     2 Gram Sodium Diet No Caffeine for 24 hours (once tests completed, may have caffeine)       Discharge Medications   Current Discharge Medication List      START taking these medications    Details   cephALEXin (KEFLEX) 500 MG capsule Take 1 capsule (500 mg) by mouth 2 times daily for 3 days  Qty: 6 capsule, Refills: 0    Associated Diagnoses: Pneumonia of both lower lobes due to infectious organism      guaiFENesin (ROBITUSSIN) 20 mg/mL SOLN solution Take 10 mLs by mouth every 4 hours as needed for cough  Qty: 118 mL, Refills: 0    Associated Diagnoses: Cough      hypromellose-dextran (ARTIFICAL TEARS) 0.1-0.3 % SOLN ophthalmic  solution Apply 2-3 drops to eye every hour as needed for dry eyes  Qty: 30 mL, Refills: 0    Associated Diagnoses: Dry eyes      predniSONE (DELTASONE) 20 MG tablet Take 1 tablet (20 mg) by mouth daily for 3 days  Qty: 3 tablet, Refills: 0    Associated Diagnoses: COPD exacerbation (H)         CONTINUE these medications which have CHANGED    Details   furosemide (LASIX) 80 MG tablet Take 1 tablet (80 mg) by mouth 2 times daily  Qty: 30 tablet, Refills: 0    Associated Diagnoses: Acute on chronic diastolic congestive heart failure (H)         CONTINUE these medications which have NOT CHANGED    Details   albuterol (2.5 MG/3ML) 0.083% neb solution Take 2.5 mg by nebulization every 6 hours as needed for shortness of breath / dyspnea or wheezing      albuterol (PROAIR HFA, PROVENTIL HFA, VENTOLIN HFA) 108 (90 BASE) MCG/ACT inhaler Inhale 2 puffs into the lungs every 6 hours as needed for shortness of breath / dyspnea  Qty: 1 Inhaler, Refills: 0      Atorvastatin Calcium (LIPITOR PO) Take 10 mg by mouth daily       losartan (COZAAR) 25 MG tablet Take 25 mg by mouth daily      POTASSIUM CHLORIDE PO Take 500 mg by mouth daily (medication name is cloruro de potasico 500 mg)      tiotropium (SPIRIVA) 18 MCG inhalation capsule Inhale 18 mcg into the lungs daily      UNABLE TO FIND MEDICATION NAME: Serflu' (salmeterol xinafoate 25 mcg fluticasone propionate 250 mcg)  Inhaler 1 puff every morning      !! warfarin (COUMADIN) 2.5 MG tablet Take 2.5 mg by mouth Take 2.5 mg (1/2 of 5 mg tablet) on Sun, Wed and Sat      !! warfarin (COUMADIN) 5 MG tablet Take 5 mg by mouth Take 5 mg on Mon, Tue, Thurs, Fri       !! - Potential duplicate medications found. Please discuss with provider.      STOP taking these medications       azithromycin (ZITHROMAX) 250 MG tablet Comments:   Reason for Stopping:             # Pain Assessment:  Current Pain Score 9/29/2018   Patient currently in pain? yes   Pain score (0-10) 5   Pain location  Shoulder   Pain descriptors Aching   Thierry s pain level was assessed and he currently denies pain.      Allergies   No Known Allergies  Data   Most Recent 3 CBC's:  Recent Labs   Lab Test  09/30/18   0644  09/27/18   1106  08/09/18   1758   WBC  7.2  7.2  7.8   HGB  12.4*  11.5*  10.2*   MCV  94  96  96   PLT  234  230  343      Most Recent 3 BMP's:  Recent Labs   Lab Test  09/30/18   0644  09/28/18   0619  09/27/18   1106   NA  137  137  138   POTASSIUM  4.1  4.3  4.3   CHLORIDE  103  104  106   CO2  28  27  26   BUN  17  15  14   CR  0.79  0.75  0.76   ANIONGAP  6  6  6   CAMPOS  8.3*  8.7  8.0*   GLC  80  86  131*     Most Recent 2 LFT's:  Recent Labs   Lab Test  08/09/18   1758  08/15/13   1905   AST  13  24   ALT  20  36   ALKPHOS  114  68   BILITOTAL  0.1*  0.3     Most Recent INR's and Anticoagulation Dosing History:  Anticoagulation Dose History     Recent Dosing and Labs Latest Ref Rng & Units 8/15/2013 8/20/2013 8/9/2018 9/27/2018 9/28/2018 9/29/2018 9/30/2018    Warfarin 2.5 mg - - - - 2.5 mg - - -    Warfarin 3 mg - - - - - - 6 mg -    Warfarin 5 mg - - - - - 5 mg - -    INR 0.86 - 1.14 1.75(H) - 1.57(H) 2.07(H) 1.71(H) 1.72(H) 2.00(H)    INR 0.86 - 1.14 - 3.0(A)  - - - - -        Most Recent 3 Troponin's:  Recent Labs   Lab Test  09/27/18   1106  08/15/13   1914   TROPI  <0.015   --    TROPONIN   --   0.01     Most Recent Cholesterol Panel:No lab results found.  Most Recent 6 Bacteria Isolates From Any Culture (See EPIC Reports for Culture Details):No lab results found.  Most Recent TSH, T4 and A1c Labs:  Recent Labs   Lab Test  09/28/18 0619   TSH  1.21

## 2018-09-30 NOTE — PLAN OF CARE
Problem: Patient Care Overview  Goal: Plan of Care/Patient Progress Review  Outcome: No Change  [id number]: Pt resting comfortably with family at the bedside. Family assisting with interpreting. A&Ox4. Received Tylenol and Robitussin with improvement.  Tele reads A-fib. Encouraging I.S. productive cough. On 1500 FR. On 2gm Na diet. AUO. Transfers with SBA. R shin wound dressing CDI. Will continue to monitor.

## 2018-09-30 NOTE — PROGRESS NOTES
Heart Failure Care Pathway  GOALS TO BE MET BEFORE DISCHARGE:    1. Decrease congestion and/or edema with diuretic therapy to achieve near      optimal volume status.            Dyspnea improved:  Yes            Edema improved:     Yes        Net I/O and Weights since admission:          08/31 1500 - 09/30 1459  In: 3175 [P.O.:3175]  Out: 5545 [Urine:5545]  Net: -2370            Vitals:    09/27/18 1633 09/28/18 0647 09/29/18 0621 09/30/18 0655   Weight: 81.6 kg (180 lb) 78.9 kg (173 lb 15.1 oz) 80 kg (176 lb 5.9 oz) 80.2 kg (176 lb 12.9 oz)       2.  O2 sats > 92% on RA or at prior home O2 therapy level.          Current oxygenation status:       SpO2: 98 %         O2 Device: None (Room air),            Able to wean O2 this shift to keep sats > 92%:  Yes- has not had 02 on fora few days.Wears CPAP at HS        Does patient use Home O2? No    3.  Tolerates ambulation and mobility near baseline: Yes        How many times did the patient ambulate with nursing staff this shift? 2, daughter assisted with shower     Please review the Heart Failure Care Pathway for additional HF goal outcomes.  Gave pt/daughter [facility] CHF book & reviewed AVS with them - has F/u [facility] appt 10/5 but daughter plans to reschedule it as  They can't make that day. Has RX's that she will have filled at local RX. Also plan to schedule appt at the [facility].     Nia Desai RN  9/30/2018

## 2018-10-01 LAB
BACTERIA SPEC CULT: NORMAL
SPECIMEN SOURCE: NORMAL

## 2021-05-25 PROCEDURE — 96375 TX/PRO/DX INJ NEW DRUG ADDON: CPT

## 2021-05-25 PROCEDURE — 5A09357 ASSISTANCE WITH RESPIRATORY VENTILATION, LESS THAN 24 CONSECUTIVE HOURS, CONTINUOUS POSITIVE AIRWAY PRESSURE: ICD-10-PCS | Performed by: EMERGENCY MEDICINE

## 2021-05-25 PROCEDURE — 999N000105 HC STATISTIC NO DOCUMENTATION TO SUPPORT CHARGE

## 2021-05-25 PROCEDURE — C9803 HOPD COVID-19 SPEC COLLECT: HCPCS

## 2021-05-25 PROCEDURE — 99285 EMERGENCY DEPT VISIT HI MDM: CPT | Mod: 25

## 2021-05-25 PROCEDURE — 96361 HYDRATE IV INFUSION ADD-ON: CPT

## 2021-05-25 PROCEDURE — 96374 THER/PROPH/DIAG INJ IV PUSH: CPT | Mod: 59

## 2021-05-25 PROCEDURE — 93005 ELECTROCARDIOGRAM TRACING: CPT | Performed by: INTERNAL MEDICINE

## 2021-05-26 ENCOUNTER — APPOINTMENT (OUTPATIENT)
Dept: CT IMAGING | Facility: CLINIC | Age: 86
End: 2021-05-26
Attending: EMERGENCY MEDICINE
Payer: MEDICAID

## 2021-05-26 ENCOUNTER — HOSPITAL ENCOUNTER (INPATIENT)
Facility: CLINIC | Age: 86
LOS: 2 days | Discharge: HOME | End: 2021-05-28
Attending: EMERGENCY MEDICINE | Admitting: INTERNAL MEDICINE
Payer: MEDICAID

## 2021-05-26 ENCOUNTER — APPOINTMENT (OUTPATIENT)
Dept: GENERAL RADIOLOGY | Facility: CLINIC | Age: 86
End: 2021-05-26
Attending: EMERGENCY MEDICINE
Payer: MEDICAID

## 2021-05-26 DIAGNOSIS — S00.03XA CONTUSION OF FACE, SCALP AND NECK, INITIAL ENCOUNTER: ICD-10-CM

## 2021-05-26 DIAGNOSIS — J44.1 COPD EXACERBATION (H): ICD-10-CM

## 2021-05-26 DIAGNOSIS — I48.20 CHRONIC ATRIAL FIBRILLATION (H): Primary | ICD-10-CM

## 2021-05-26 DIAGNOSIS — S00.83XA CONTUSION OF FACE, SCALP AND NECK, INITIAL ENCOUNTER: ICD-10-CM

## 2021-05-26 DIAGNOSIS — S10.93XA CONTUSION OF FACE, SCALP AND NECK, INITIAL ENCOUNTER: ICD-10-CM

## 2021-05-26 LAB
ALBUMIN SERPL-MCNC: 3.6 G/DL (ref 3.4–5)
ALBUMIN UR-MCNC: NEGATIVE MG/DL
ALP SERPL-CCNC: 110 U/L (ref 40–150)
ALT SERPL W P-5'-P-CCNC: 24 U/L (ref 0–70)
ANION GAP SERPL CALCULATED.3IONS-SCNC: 3 MMOL/L (ref 3–14)
APPEARANCE UR: CLEAR
AST SERPL W P-5'-P-CCNC: 16 U/L (ref 0–45)
BASE EXCESS BLDV CALC-SCNC: 1.4 MMOL/L
BASOPHILS # BLD AUTO: 0 10E9/L (ref 0–0.2)
BASOPHILS NFR BLD AUTO: 0.3 %
BILIRUB SERPL-MCNC: 0.4 MG/DL (ref 0.2–1.3)
BILIRUB UR QL STRIP: NEGATIVE
BUN SERPL-MCNC: 21 MG/DL (ref 7–30)
CALCIUM SERPL-MCNC: 8.4 MG/DL (ref 8.5–10.1)
CHLORIDE SERPL-SCNC: 105 MMOL/L (ref 94–109)
CO2 SERPL-SCNC: 30 MMOL/L (ref 20–32)
COLOR UR AUTO: NORMAL
CREAT SERPL-MCNC: 0.72 MG/DL (ref 0.66–1.25)
CREAT SERPL-MCNC: 1.02 MG/DL (ref 0.66–1.25)
DIFFERENTIAL METHOD BLD: ABNORMAL
EOSINOPHIL # BLD AUTO: 0.5 10E9/L (ref 0–0.7)
EOSINOPHIL NFR BLD AUTO: 8.3 %
ERYTHROCYTE [DISTWIDTH] IN BLOOD BY AUTOMATED COUNT: 13.3 % (ref 10–15)
GFR SERPL CREATININE-BSD FRML MDRD: 67 ML/MIN/{1.73_M2}
GFR SERPL CREATININE-BSD FRML MDRD: 85 ML/MIN/{1.73_M2}
GLUCOSE BLDC GLUCOMTR-MCNC: 131 MG/DL (ref 70–99)
GLUCOSE BLDC GLUCOMTR-MCNC: 141 MG/DL (ref 70–99)
GLUCOSE BLDC GLUCOMTR-MCNC: 145 MG/DL (ref 70–99)
GLUCOSE BLDC GLUCOMTR-MCNC: 149 MG/DL (ref 70–99)
GLUCOSE BLDC GLUCOMTR-MCNC: 152 MG/DL (ref 70–99)
GLUCOSE SERPL-MCNC: 119 MG/DL (ref 70–99)
GLUCOSE UR STRIP-MCNC: NEGATIVE MG/DL
HCO3 BLDV-SCNC: 28 MMOL/L (ref 21–28)
HCT VFR BLD AUTO: 43.7 % (ref 40–53)
HGB BLD-MCNC: 13.6 G/DL (ref 13.3–17.7)
HGB UR QL STRIP: NEGATIVE
HYALINE CASTS #/AREA URNS LPF: 1 /LPF (ref 0–2)
IMM GRANULOCYTES # BLD: 0 10E9/L (ref 0–0.4)
IMM GRANULOCYTES NFR BLD: 0.3 %
INR PPP: 1.45 (ref 0.86–1.14)
INTERPRETATION ECG - MUSE: NORMAL
KETONES UR STRIP-MCNC: NEGATIVE MG/DL
LABORATORY COMMENT REPORT: NORMAL
LEUKOCYTE ESTERASE UR QL STRIP: NEGATIVE
LYMPHOCYTES # BLD AUTO: 1.3 10E9/L (ref 0.8–5.3)
LYMPHOCYTES NFR BLD AUTO: 20.5 %
MAGNESIUM SERPL-MCNC: 2.3 MG/DL (ref 1.6–2.3)
MCH RBC QN AUTO: 31.3 PG (ref 26.5–33)
MCHC RBC AUTO-ENTMCNC: 31.1 G/DL (ref 31.5–36.5)
MCV RBC AUTO: 101 FL (ref 78–100)
MONOCYTES # BLD AUTO: 0.9 10E9/L (ref 0–1.3)
MONOCYTES NFR BLD AUTO: 14.6 %
NEUTROPHILS # BLD AUTO: 3.4 10E9/L (ref 1.6–8.3)
NEUTROPHILS NFR BLD AUTO: 56 %
NITRATE UR QL: NEGATIVE
NRBC # BLD AUTO: 0 10*3/UL
NRBC BLD AUTO-RTO: 0 /100
NT-PROBNP SERPL-MCNC: 872 PG/ML (ref 0–1800)
O2/TOTAL GAS SETTING VFR VENT: ABNORMAL %
OXYHGB MFR BLDV: 84 %
PCO2 BLDV: 51 MM HG (ref 40–50)
PH BLDV: 7.35 PH (ref 7.32–7.43)
PH UR STRIP: 5 PH (ref 5–7)
PLATELET # BLD AUTO: 250 10E9/L (ref 150–450)
PO2 BLDV: 52 MM HG (ref 25–47)
POTASSIUM SERPL-SCNC: 4 MMOL/L (ref 3.4–5.3)
PROT SERPL-MCNC: 7 G/DL (ref 6.8–8.8)
RBC # BLD AUTO: 4.34 10E12/L (ref 4.4–5.9)
RBC #/AREA URNS AUTO: 1 /HPF (ref 0–2)
SARS-COV-2 RNA RESP QL NAA+PROBE: NEGATIVE
SODIUM SERPL-SCNC: 138 MMOL/L (ref 133–144)
SOURCE: NORMAL
SP GR UR STRIP: 1.02 (ref 1–1.03)
SPECIMEN SOURCE: NORMAL
TROPONIN I SERPL-MCNC: <0.015 UG/L (ref 0–0.04)
UROBILINOGEN UR STRIP-MCNC: NORMAL MG/DL (ref 0–2)
WBC # BLD AUTO: 6.2 10E9/L (ref 4–11)
WBC #/AREA URNS AUTO: 1 /HPF (ref 0–5)

## 2021-05-26 PROCEDURE — 250N000011 HC RX IP 250 OP 636: Performed by: EMERGENCY MEDICINE

## 2021-05-26 PROCEDURE — 80053 COMPREHEN METABOLIC PANEL: CPT | Performed by: EMERGENCY MEDICINE

## 2021-05-26 PROCEDURE — 70450 CT HEAD/BRAIN W/O DYE: CPT

## 2021-05-26 PROCEDURE — 258N000003 HC RX IP 258 OP 636: Performed by: EMERGENCY MEDICINE

## 2021-05-26 PROCEDURE — 71045 X-RAY EXAM CHEST 1 VIEW: CPT

## 2021-05-26 PROCEDURE — 200N000001 HC R&B ICU

## 2021-05-26 PROCEDURE — 83735 ASSAY OF MAGNESIUM: CPT | Performed by: EMERGENCY MEDICINE

## 2021-05-26 PROCEDURE — 250N000011 HC RX IP 250 OP 636: Performed by: INTERNAL MEDICINE

## 2021-05-26 PROCEDURE — 94640 AIRWAY INHALATION TREATMENT: CPT | Mod: 76

## 2021-05-26 PROCEDURE — 83880 ASSAY OF NATRIURETIC PEPTIDE: CPT | Performed by: EMERGENCY MEDICINE

## 2021-05-26 PROCEDURE — 85025 COMPLETE CBC W/AUTO DIFF WBC: CPT | Performed by: EMERGENCY MEDICINE

## 2021-05-26 PROCEDURE — 250N000013 HC RX MED GY IP 250 OP 250 PS 637: Performed by: INTERNAL MEDICINE

## 2021-05-26 PROCEDURE — 999N000185 HC STATISTIC TRANSPORT TIME EA 15 MIN

## 2021-05-26 PROCEDURE — 85610 PROTHROMBIN TIME: CPT | Performed by: EMERGENCY MEDICINE

## 2021-05-26 PROCEDURE — 250N000009 HC RX 250: Performed by: INTERNAL MEDICINE

## 2021-05-26 PROCEDURE — 36415 COLL VENOUS BLD VENIPUNCTURE: CPT | Performed by: INTERNAL MEDICINE

## 2021-05-26 PROCEDURE — 999N000157 HC STATISTIC RCP TIME EA 10 MIN

## 2021-05-26 PROCEDURE — 82565 ASSAY OF CREATININE: CPT | Performed by: INTERNAL MEDICINE

## 2021-05-26 PROCEDURE — 94640 AIRWAY INHALATION TREATMENT: CPT

## 2021-05-26 PROCEDURE — 87635 SARS-COV-2 COVID-19 AMP PRB: CPT | Performed by: EMERGENCY MEDICINE

## 2021-05-26 PROCEDURE — 250N000009 HC RX 250

## 2021-05-26 PROCEDURE — 81001 URINALYSIS AUTO W/SCOPE: CPT | Performed by: EMERGENCY MEDICINE

## 2021-05-26 PROCEDURE — 999N001017 HC STATISTIC GLUCOSE BY METER IP

## 2021-05-26 PROCEDURE — 94660 CPAP INITIATION&MGMT: CPT

## 2021-05-26 PROCEDURE — 71275 CT ANGIOGRAPHY CHEST: CPT

## 2021-05-26 PROCEDURE — 82805 BLOOD GASES W/O2 SATURATION: CPT | Performed by: INTERNAL MEDICINE

## 2021-05-26 PROCEDURE — 84484 ASSAY OF TROPONIN QUANT: CPT | Performed by: EMERGENCY MEDICINE

## 2021-05-26 PROCEDURE — 250N000009 HC RX 250: Performed by: EMERGENCY MEDICINE

## 2021-05-26 PROCEDURE — 99223 1ST HOSP IP/OBS HIGH 75: CPT | Mod: AI | Performed by: INTERNAL MEDICINE

## 2021-05-26 RX ORDER — ONDANSETRON 2 MG/ML
4 INJECTION INTRAMUSCULAR; INTRAVENOUS EVERY 6 HOURS PRN
Status: DISCONTINUED | OUTPATIENT
Start: 2021-05-26 | End: 2021-05-28 | Stop reason: HOSPADM

## 2021-05-26 RX ORDER — IPRATROPIUM BROMIDE AND ALBUTEROL SULFATE 2.5; .5 MG/3ML; MG/3ML
3 SOLUTION RESPIRATORY (INHALATION)
Status: COMPLETED | OUTPATIENT
Start: 2021-05-26 | End: 2021-05-26

## 2021-05-26 RX ORDER — ONDANSETRON 4 MG/1
4 TABLET, ORALLY DISINTEGRATING ORAL EVERY 6 HOURS PRN
Status: DISCONTINUED | OUTPATIENT
Start: 2021-05-26 | End: 2021-05-28 | Stop reason: HOSPADM

## 2021-05-26 RX ORDER — AMOXICILLIN 250 MG
2 CAPSULE ORAL 2 TIMES DAILY PRN
Status: DISCONTINUED | OUTPATIENT
Start: 2021-05-26 | End: 2021-05-28 | Stop reason: HOSPADM

## 2021-05-26 RX ORDER — IOPAMIDOL 755 MG/ML
500 INJECTION, SOLUTION INTRAVASCULAR ONCE
Status: COMPLETED | OUTPATIENT
Start: 2021-05-26 | End: 2021-05-26

## 2021-05-26 RX ORDER — HYDROMORPHONE HYDROCHLORIDE 1 MG/ML
0.5 INJECTION, SOLUTION INTRAMUSCULAR; INTRAVENOUS; SUBCUTANEOUS
Status: COMPLETED | OUTPATIENT
Start: 2021-05-26 | End: 2021-05-26

## 2021-05-26 RX ORDER — POLYETHYLENE GLYCOL 3350 17 G/17G
17 POWDER, FOR SOLUTION ORAL DAILY PRN
Status: DISCONTINUED | OUTPATIENT
Start: 2021-05-26 | End: 2021-05-28 | Stop reason: HOSPADM

## 2021-05-26 RX ORDER — IPRATROPIUM BROMIDE AND ALBUTEROL SULFATE 2.5; .5 MG/3ML; MG/3ML
6 SOLUTION RESPIRATORY (INHALATION) ONCE
Status: COMPLETED | OUTPATIENT
Start: 2021-05-26 | End: 2021-05-26

## 2021-05-26 RX ORDER — FUROSEMIDE 80 MG
80 TABLET ORAL EVERY MORNING
COMMUNITY
End: 2021-12-20

## 2021-05-26 RX ORDER — PREDNISONE 20 MG/1
40 TABLET ORAL DAILY
Status: DISCONTINUED | OUTPATIENT
Start: 2021-05-27 | End: 2021-05-28 | Stop reason: HOSPADM

## 2021-05-26 RX ORDER — NICOTINE POLACRILEX 4 MG
15-30 LOZENGE BUCCAL
Status: DISCONTINUED | OUTPATIENT
Start: 2021-05-26 | End: 2021-05-28 | Stop reason: HOSPADM

## 2021-05-26 RX ORDER — LOSARTAN POTASSIUM 50 MG/1
50 TABLET ORAL DAILY
Status: DISCONTINUED | OUTPATIENT
Start: 2021-05-26 | End: 2021-05-28 | Stop reason: HOSPADM

## 2021-05-26 RX ORDER — GLIPIZIDE 10 MG/1
2-3 TABLET ORAL
Status: DISCONTINUED | OUTPATIENT
Start: 2021-05-26 | End: 2021-05-28 | Stop reason: HOSPADM

## 2021-05-26 RX ORDER — ALBUTEROL SULFATE 0.83 MG/ML
2.5 SOLUTION RESPIRATORY (INHALATION)
Status: DISCONTINUED | OUTPATIENT
Start: 2021-05-26 | End: 2021-05-28 | Stop reason: HOSPADM

## 2021-05-26 RX ORDER — IPRATROPIUM BROMIDE AND ALBUTEROL SULFATE 2.5; .5 MG/3ML; MG/3ML
SOLUTION RESPIRATORY (INHALATION)
Status: COMPLETED
Start: 2021-05-26 | End: 2021-05-26

## 2021-05-26 RX ORDER — IPRATROPIUM BROMIDE AND ALBUTEROL SULFATE 2.5; .5 MG/3ML; MG/3ML
3 SOLUTION RESPIRATORY (INHALATION)
Status: DISCONTINUED | OUTPATIENT
Start: 2021-05-26 | End: 2021-05-28 | Stop reason: HOSPADM

## 2021-05-26 RX ORDER — FAMOTIDINE 20 MG/1
40 TABLET, FILM COATED ORAL DAILY
Status: DISCONTINUED | OUTPATIENT
Start: 2021-05-26 | End: 2021-05-26

## 2021-05-26 RX ORDER — METHYLPREDNISOLONE SODIUM SUCCINATE 125 MG/2ML
125 INJECTION, POWDER, LYOPHILIZED, FOR SOLUTION INTRAMUSCULAR; INTRAVENOUS ONCE
Status: COMPLETED | OUTPATIENT
Start: 2021-05-26 | End: 2021-05-26

## 2021-05-26 RX ORDER — DEXTROSE MONOHYDRATE 25 G/50ML
25-50 INJECTION, SOLUTION INTRAVENOUS
Status: DISCONTINUED | OUTPATIENT
Start: 2021-05-26 | End: 2021-05-28 | Stop reason: HOSPADM

## 2021-05-26 RX ORDER — FUROSEMIDE 40 MG
80 TABLET ORAL EVERY 8 HOURS
Status: DISCONTINUED | OUTPATIENT
Start: 2021-05-26 | End: 2021-05-27

## 2021-05-26 RX ORDER — WARFARIN SODIUM 5 MG/1
5 TABLET ORAL
Status: COMPLETED | OUTPATIENT
Start: 2021-05-26 | End: 2021-05-26

## 2021-05-26 RX ORDER — AMOXICILLIN 250 MG
1 CAPSULE ORAL 2 TIMES DAILY PRN
Status: DISCONTINUED | OUTPATIENT
Start: 2021-05-26 | End: 2021-05-28 | Stop reason: HOSPADM

## 2021-05-26 RX ORDER — ATORVASTATIN CALCIUM 10 MG/1
20 TABLET, FILM COATED ORAL DAILY
Status: DISCONTINUED | OUTPATIENT
Start: 2021-05-26 | End: 2021-05-28 | Stop reason: HOSPADM

## 2021-05-26 RX ORDER — METHYLPREDNISOLONE SODIUM SUCCINATE 125 MG/2ML
60 INJECTION, POWDER, LYOPHILIZED, FOR SOLUTION INTRAMUSCULAR; INTRAVENOUS EVERY 12 HOURS
Status: DISCONTINUED | OUTPATIENT
Start: 2021-05-26 | End: 2021-05-26

## 2021-05-26 RX ORDER — ACETAMINOPHEN 325 MG/1
650 TABLET ORAL EVERY 4 HOURS PRN
Status: DISCONTINUED | OUTPATIENT
Start: 2021-05-26 | End: 2021-05-28 | Stop reason: HOSPADM

## 2021-05-26 RX ORDER — FUROSEMIDE 10 MG/ML
60 INJECTION INTRAMUSCULAR; INTRAVENOUS ONCE
Status: COMPLETED | OUTPATIENT
Start: 2021-05-26 | End: 2021-05-26

## 2021-05-26 RX ORDER — LIDOCAINE 4 G/G
2 PATCH TOPICAL
Status: DISCONTINUED | OUTPATIENT
Start: 2021-05-26 | End: 2021-05-28 | Stop reason: HOSPADM

## 2021-05-26 RX ADMIN — LIDOCAINE 2 PATCH: 560 PATCH PERCUTANEOUS; TOPICAL; TRANSDERMAL at 09:46

## 2021-05-26 RX ADMIN — WARFARIN SODIUM 5 MG: 5 TABLET ORAL at 18:12

## 2021-05-26 RX ADMIN — FUROSEMIDE 80 MG: 40 TABLET ORAL at 14:52

## 2021-05-26 RX ADMIN — FAMOTIDINE 40 MG: 20 TABLET ORAL at 10:57

## 2021-05-26 RX ADMIN — IPRATROPIUM BROMIDE AND ALBUTEROL SULFATE 6 ML: 2.5; .5 SOLUTION RESPIRATORY (INHALATION) at 03:19

## 2021-05-26 RX ADMIN — IPRATROPIUM BROMIDE AND ALBUTEROL SULFATE 6 ML: .5; 3 SOLUTION RESPIRATORY (INHALATION) at 03:19

## 2021-05-26 RX ADMIN — ENOXAPARIN SODIUM 40 MG: 40 INJECTION SUBCUTANEOUS at 12:34

## 2021-05-26 RX ADMIN — OMEPRAZOLE 20 MG: 20 CAPSULE, DELAYED RELEASE ORAL at 16:15

## 2021-05-26 RX ADMIN — OMEPRAZOLE 20 MG: 20 CAPSULE, DELAYED RELEASE ORAL at 10:57

## 2021-05-26 RX ADMIN — SODIUM CHLORIDE 63 ML: 9 INJECTION, SOLUTION INTRAVENOUS at 03:05

## 2021-05-26 RX ADMIN — FLUTICASONE FUROATE AND VILANTEROL TRIFENATATE 1 PUFF: 200; 25 POWDER RESPIRATORY (INHALATION) at 12:04

## 2021-05-26 RX ADMIN — FUROSEMIDE 60 MG: 10 INJECTION, SOLUTION INTRAMUSCULAR; INTRAVENOUS at 09:45

## 2021-05-26 RX ADMIN — METHYLPREDNISOLONE SODIUM SUCCINATE 62.5 MG: 125 INJECTION, POWDER, FOR SOLUTION INTRAMUSCULAR; INTRAVENOUS at 09:45

## 2021-05-26 RX ADMIN — IPRATROPIUM BROMIDE AND ALBUTEROL SULFATE 3 ML: .5; 3 SOLUTION RESPIRATORY (INHALATION) at 00:57

## 2021-05-26 RX ADMIN — IOPAMIDOL 93 ML: 755 INJECTION, SOLUTION INTRAVENOUS at 03:05

## 2021-05-26 RX ADMIN — IPRATROPIUM BROMIDE AND ALBUTEROL SULFATE 3 ML: .5; 3 SOLUTION RESPIRATORY (INHALATION) at 01:00

## 2021-05-26 RX ADMIN — SODIUM CHLORIDE 500 ML: 9 INJECTION, SOLUTION INTRAVENOUS at 01:11

## 2021-05-26 RX ADMIN — IPRATROPIUM BROMIDE AND ALBUTEROL SULFATE 3 ML: .5; 3 SOLUTION RESPIRATORY (INHALATION) at 12:04

## 2021-05-26 RX ADMIN — ACETAMINOPHEN 650 MG: 325 TABLET, FILM COATED ORAL at 10:57

## 2021-05-26 RX ADMIN — IPRATROPIUM BROMIDE AND ALBUTEROL SULFATE 3 ML: .5; 3 SOLUTION RESPIRATORY (INHALATION) at 01:19

## 2021-05-26 RX ADMIN — IPRATROPIUM BROMIDE AND ALBUTEROL SULFATE 3 ML: .5; 3 SOLUTION RESPIRATORY (INHALATION) at 15:19

## 2021-05-26 RX ADMIN — HYDROMORPHONE HYDROCHLORIDE 0.5 MG: 1 INJECTION, SOLUTION INTRAMUSCULAR; INTRAVENOUS; SUBCUTANEOUS at 03:40

## 2021-05-26 RX ADMIN — IPRATROPIUM BROMIDE AND ALBUTEROL SULFATE 3 ML: .5; 3 SOLUTION RESPIRATORY (INHALATION) at 20:15

## 2021-05-26 RX ADMIN — FUROSEMIDE 80 MG: 40 TABLET ORAL at 22:14

## 2021-05-26 RX ADMIN — GUAIFENESIN 10 ML: 100 SOLUTION ORAL at 22:15

## 2021-05-26 RX ADMIN — METHYLPREDNISOLONE SODIUM SUCCINATE 125 MG: 125 INJECTION, POWDER, FOR SOLUTION INTRAMUSCULAR; INTRAVENOUS at 01:11

## 2021-05-26 ASSESSMENT — ENCOUNTER SYMPTOMS
BACK PAIN: 1
FEVER: 0
SHORTNESS OF BREATH: 1

## 2021-05-26 ASSESSMENT — ACTIVITIES OF DAILY LIVING (ADL)
ADLS_ACUITY_SCORE: 21
ADLS_ACUITY_SCORE: 18
ADLS_ACUITY_SCORE: 18

## 2021-05-26 ASSESSMENT — MIFFLIN-ST. JEOR: SCORE: 1476.25

## 2021-05-26 NOTE — PLAN OF CARE
ICU End of Shift Summary.  For vital signs and complete assessments, please see documentation flowsheets.     Pertinent assessments: Daughter at bedside interpreting.  A&O. Up to chair with 2A. LS dim with occasional wheeze, presently on 2L NC. Frequent dry cough. GUNTER. Voiding via urinal with good response to Lasix. NPO advanced to regular diet. PIV SL.   Major Shift Events: Patient waiver of  services signed by patient. Transitioned from Bipap to 2L NC.   Plan (Upcoming Events): Continue with Prednisone, Nebs and response to Lasix. BIPAP PRN.   Discharge/Transfer Needs: TBD. Discharge to home at discharge. Lives with daughter.     Bedside Shift Report Completed : Y  Bedside Safety Check Completed: Y

## 2021-05-26 NOTE — H&P
[facility]  Hospitalist Admission Note  Name: Thierry Samuel    MRN: [id number]  YOB: 1935    Age: 86 year old  Date of admission: 5/26/2021  Primary care provider: [facility]    Chief Complaint: Back pain, shortness of breath    Assessment and Plan:   Acute on chronic hypoxemic respiratory failure secondary to COPD exacerbation, SUSY: Patient has severe COPD at baseline and is on multiple inhalers.  In [place] he has a oxygen prescription for 3 L that he uses as needed, but does not have this with him here needed states who is visiting his daughter for 3 months.  He also has sleep apnea and uses CPAP every night, he does have this machine.  He has severe pulmonary hypertension secondary to these 2 issues.  He has become progressively more short of breath throughout the day today after a fall yesterday morning resulting in upper back pain.  He was significantly wheezing on presentation to the ER and tachypneic with poor air flow movement overall consistent with COPD exacerbation he has been started on IV Solu-Medrol.  Wheezing improved after 3 duo nebs.  CTPA negative for PE, infiltrate, or pulmonary edema.  CT does show cardiomegaly and evidence for right heart dysfunction, which I suspect is his severe pulmonary HTN and not a new finding.  COVID-19 PCR negative and he is fully vaccinated.  Afebrile no leukocytosis.  Has a chronic cough that is unchanged from baseline.  -Continue BiPAP this morning, obtain VBG at 8 AM.  If ABG looks okay and respiratory status looks stable trial off BiPAP later on this morning  -IV Solu-Medrol 60 mg every 12 hours  -DuoNebs 4 times daily and albuterol nebs as needed  -He is on Scottish formulation for Advair so I have ordered Advair  -He also takes Spiriva it looks like he also is on ipratropium inhaler 6 puffs every 8 hours at baseline, both of which will be held while on scheduled duo nebs  -Improve his back pain so that  way he can breathe deeper, encourage incentive spirometer once off BiPAP  -Allow 2 g sodium restriction diet when off BiPAP  -He does not have an oxygen prescription here in [place] and is staying for the next 2 to 3 months.  Need to monitor daytime and exertional hypoxia closer to discharge to see if O2 sats are dropping too low  -Continue his CPAP at bedtime for SUSY which he uses every night and has with him here in [place]    Fall, upper back pain: Fell rolling over in bed 24 hours prior to presentation hitting his forehead on the dresser and left arm.  Has a hematoma to the left forehead.  Noncontrast head CT negative for any acute pathology or hemorrhage.  Since then reporting left upper back pain minimally improved with ibuprofen at home.  CT chest with IV contrast did not show any rib fractures.  -Acetaminophen as needed  -Place lidocaine patches to area of pain  -NSAIDs not recommended given he is on warfarin and additionally just had a fall so this would increase his risk of bleeding    Severe pulmonary HTN, chronic diastolic CHF: TTE from 9/2018 showed severe pulmonary hypertension likely secondary to his SUSY and severe COPD.  He is chronically on Lasix 80 mg every 8 hours presumably for diastolic CHF or perhaps for his edema.  He has 1-2+ lower extremity pitting edema on exam which is a little bit more than his baseline per daughter.  His BNP is not elevated and there is no sign of pulmonary edema on his CT of his chest.  -Give 1 dose 60 mg IV Lasix then continue his oral Lasix 80 mg p.o. every 8 hours  -Strict intake and output and daily weights  -Resume losartan 50 mg daily  -Potassium and magnesium replacement protocols    A. fib: s/p pacemaker placement.  EKG shows paced rhythm.  Chronically on warfarin with subtherapeutic INR on admission.  Not on any AV ward blockade.  -Continue warfarin, pharmacy to dose, daily INR    History of GI bleed: Continue omeprazole 40 mg twice daily and  famotidine daily.    HLD: Resume atorvastatin 10 mg daily.    COVID-19 PCR negative, fully vaccinated with a Pfizer vaccine as of last month  DVT Prophylaxis: Warfarin  Code Status: Full Code -discussed with patient and his daughter  FEN: N.p.o. requiring BiPAP then allow 2 g Na restriction diet, no IV fluids  Discharge Dispo: Lives in [facility], visiting with his daughter for the next 2 months.  Anticipate he will return home with daughter once respiratory status improved.  Question if he will need home oxygen, he does not have this here but has it at home anyway  Estimated Disch Date / # of Days until Disch: Admit inpatient initially to ICU due to need for BiPAP.  Anticipate 2-3 night hospitalization.      History of Present Illness:  Thierry Samuel is a 86 year old [place]ijani-speaking male who lives in [facility], but visits his daughter for 3 months every year with PMH including severe COPD on 3 L via nasal cannula as needed, SUSY using CPAP, severe pulmonary HTN, diastolic CHF, A. fib on warfarin s/pacemaker placement,p HTN, HLD, history of GI bleed, and history chronic left leg wound who presents with his daughter for evaluation of back pain and shortness of breath.  He is currently staying with his daughter for the next couple of months before returning home to [facility].  Yesterday morning roughly 20 hours ago he rolled over in bed and fell out.  He hit his left forehead and cheek on the dresser along with his left arm.  Since that time he is reporting moderate to severe left upper back pain that is constant and worse with movement.  He took ibuprofen without improvement.  Also throughout the day he has become progressively more short of breath.  He has a chronic cough that has not changed from baseline.  He does have chronic leg swelling which is a little bit worse than baseline per his daughter.  He has not had any recent fevers or chills.  They deny any recent sick contacts.  He has been fully vaccinated for  COVID-19 with a Pfizer vaccine last month.  He does use oxygen 3 L as needed when at home and ergoloid, but does not have the oxygen with him here in the [place].  He does have a CPAP machine which he has been using every night.  On review of systems he denies any nausea, vomiting, diarrhea, chest pain.      History obtained from patient, patient's daughter, medical record, and from Dr. Rae in the emergency department.  Blood pressure 147/64, heart rate 60, temperature 98.0  F, oxygen was 98% on 2 L, but he had a respiratory rate above 20 along with tachypnea so eventually he was placed on BiPAP and looks much more comfortable.  Initially he was very wheezy and had poor airflow movement so he received 3 duo nebs and 125 mg IV Solu-Medrol for presumed COPD exacerbation.  He received 0.5 mg IV Dilaudid for his back pain.  He also received 500 mL normal saline.  His creatinine is 1.02, bicarb 30, rest of CMP unremarkable.  BNP normal at 872.  Troponin is undetectable.  WBC 6.2, hemoglobin 13.6, platelet count 250.  His COVID-19 PCR is negative.  Urinalysis shows only 1 WBC.  INR subtherapeutic at 1.45.  He underwent a noncontrast head CT that was negative for any acute pathology.  He underwent a CT chest with IV contrast that shows emphysema, no PE, a dilated thoracic aortic aneurysm measuring 4.0 cm, cardiomegaly, and right heart dysfunction but no infiltrate or pulmonary edema.  He will be admitted to the ICU due to BiPAP needs as an inpatient.     Past Medical History reviewed:  Past Medical History:   Diagnosis Date     A-fib (H)     s/p pacemaker     GI bleed      Hypertension      Moderate to severe pulmonary hypertension (H)      SUSY (obstructive sleep apnea)      SUSY (obstructive sleep apnea)      Past Surgical History reviewed:  Past Surgical History:   Procedure Laterality Date     APPENDECTOMY       CARDIAC SURGERY     pacemaker placement    Social History reviewed:  Social History     Tobacco Use      Smoking status: Former Smoker     Smokeless tobacco: Never Used   Substance Use Topics     Alcohol use: Yes     Comment: social     Social History     Social History Narrative     Not on file     Family History reviewed:  Daughter does not believe there is any family history of DVT or PE    Allergies:  No Known Allergies  Medications:  Prior to Admission medications    Medication Sig Last Dose Taking? Auth Provider   albuterol (2.5 MG/3ML) 0.083% neb solution Take 2.5 mg by nebulization every 6 hours as needed for shortness of breath / dyspnea or wheezing   Unknown, Entered By History   albuterol (PROAIR HFA, PROVENTIL HFA, VENTOLIN HFA) 108 (90 BASE) MCG/ACT inhaler Inhale 2 puffs into the lungs every 6 hours as needed for shortness of breath / dyspnea   Daquan Pickens MD   Atorvastatin Calcium (LIPITOR PO) Take 10 mg by mouth daily    Reported, Patient   furosemide (LASIX) 80 MG tablet Take 1 tablet (80 mg) by mouth every 8 hours   Dane Leon MD   guaiFENesin (ROBITUSSIN) 20 mg/mL SOLN solution Take 10 mLs by mouth every 4 hours as needed for cough   Dane Leon MD   hypromellose-dextran (ARTIFICAL TEARS) 0.1-0.3 % SOLN ophthalmic solution Apply 2-3 drops to eye every hour as needed for dry eyes   Dane Leon MD   losartan (COZAAR) 50 MG tablet Take 50 mg by mouth daily   Unknown, Entered By History   POTASSIUM CHLORIDE PO Take 500 mg by mouth daily (medication name is cloruro de potasico 500 mg)   Unknown, Entered By History   tiotropium (SPIRIVA) 18 MCG inhalation capsule Inhale 18 mcg into the lungs daily   Reported, Patient   UNABLE TO FIND MEDICATION NAME: Serflu' (salmeterol xinafoate 25 mcg fluticasone propionate 250 mcg)  Inhaler 1 puff every morning   Unknown, Entered By History   warfarin (COUMADIN) 2.5 MG tablet Take 2.5 mg by mouth Take 2.5 mg (1/2 of 5 mg tablet) on Sun, Wed and Sat   Unknown, Entered By History   warfarin (COUMADIN) 5 MG tablet Take 5 mg  by mouth Take 5 mg on Mon, Tue, Thurs, Fri   Unknown, Entered By History   Famotidine 40 mg daily  Omeprazole 20 mg twice daily    Review of Systems:  A Comprehensive greater than 10 system review of systems was carried out.  Pertinent positives and negatives are noted above.  Otherwise negative.     Physical Exam:  Blood pressure (!) 152/82, pulse 60, temperature 98  F (36.7  C), resp. rate 16, SpO2 96 %.  Wt Readings from Last 1 Encounters:   09/30/18 80.2 kg (176 lb 12.9 oz)     Exam:  Constitutional: Awake, appears comfortable on BiPAP  Eyes: sclera white   HEENT: atraumatic, MMM  Respiratory: Appears comfortable on BiPAP, respiratory rate under 20, poor airflow movement overall, no focal crackles or wheeze  Cardiovascular: RRR.  No murmur appreciated  GI: non-tender, not distended, bowel sounds present  Skin: no rash or lesions, acyanotic  Musculoskeletal/extremities: Previous chronic wound or surgical changes to the left lower leg.  1-2+ bilateral lower extremity pitting edema  Neurologic: Alert, attempts to answer questions in Sami through BiPAP machine but difficult to hear, follows commands  Psychiatric: calm, cooperative     Lab and imaging data personally reviewed:  Labs:  Recent Labs   Lab 05/26/21 0047   WBC 6.2   HGB 13.6   HCT 43.7   *        Recent Labs   Lab 05/26/21 0047      POTASSIUM 4.0   CHLORIDE 105   CO2 30   ANIONGAP 3   *   BUN 21   CR 1.02   GFRESTIMATED 67   GFRESTBLACK 77   CAMPOS 8.4*   PROTTOTAL 7.0   ALBUMIN 3.6   BILITOTAL 0.4   ALKPHOS 110   AST 16   ALT 24     Recent Labs   Lab 05/26/21 0047   NTBNPI 872     Recent Labs   Lab 05/26/21 0047   INR 1.45*     Recent Labs   Lab 05/26/21 0047   TROPI <0.015     Recent Labs   Lab 05/26/21 0312   COLOR Light Yellow   APPEARANCE Clear   URINEGLC Negative   URINEBILI Negative   URINEKETONE Negative   SG 1.017   UBLD Negative   URINEPH 5.0   PROTEIN Negative   NITRITE Negative   LEUKEST Negative   RBCU 1    WBCU 1     INR 1.45  COVID-19 PCR negative    EKG: Paced rhythm    Imaging:  Recent Results (from the past 24 hour(s))   XR Chest Port 1 View    Narrative    EXAM: XR CHEST PORT 1 VIEW  LOCATION: [facility], [place]  DATE/TIME: 5/26/2021 1:36 AM    INDICATION: Shortness of breath  COMPARISON: 09/27/2018      Impression    IMPRESSION: Cardiomegaly. Pulmonary vascularity normal. Slight interstitial prominence may represent mild edema. Subsegmental atelectasis in the bases. No airspace infiltrates or pleural effusions. Right-sided pacemaker lead tip in the right ventricle.   CT Head w/o Contrast    Narrative    EXAM: CT HEAD W/O CONTRAST  LOCATION: [facility], [place]  DATE/TIME: 5/26/2021 2:53 AM    INDICATION: Headache, intracranial hemorrhage suspected  COMPARISON: None.  TECHNIQUE: Routine CT Head without IV contrast. Multiplanar reformats. Dose reduction techniques were used.    FINDINGS:  INTRACRANIAL CONTENTS: No intracranial hemorrhage, extraaxial collection, or mass effect.  No CT evidence of acute infarct. Mild presumed chronic small vessel ischemic changes. Moderate generalized volume loss. No hydrocephalus.     VISUALIZED ORBITS/SINUSES/MASTOIDS: No intraorbital abnormality. No paranasal sinus mucosal disease. No middle ear or mastoid effusion.    BONES/SOFT TISSUES: No acute abnormality. Vascular calcifications.      Impression    IMPRESSION:  1.  No acute intracranial process.  2.  Age related changes described above.   CT Chest Pulmonary Embolism w Contrast    Narrative    EXAM: CT CHEST PULMONARY EMBOLISM W CONTRAST  LOCATION: [facility], [place]  DATE/TIME: 5/26/2021 2:42 AM    INDICATION: Difficulty breathing and thoracic pain. Recent fall. Concern for pulmonary embolus.  COMPARISON: Chest radiograph today.  TECHNIQUE: CT chest pulmonary angiogram during arterial phase injection of IV contrast. Multiplanar reformats and MIP reconstructions were performed. Dose reduction techniques  were used.   CONTRAST: 93mL Isovue-370    FINDINGS:  ANGIOGRAM CHEST: No pulmonary embolus detected. Tortuous mildly dilated thoracic aorta measures up to 4.0 cm in the ascending portion. Reflux of contrast into the IVC consistent with right heart dysfunction.    LUNGS AND PLEURA: Moderate centrilobular emphysema with upper lobe predominance. Mild atelectasis of both lower lungs. No pleural fluid or pneumothorax.    MEDIASTINUM/AXILLAE: Enlarged heart. Right subclavian pacemaker with right ventricular lead.    CORONARY ARTERY CALCIFICATION: Moderate.    UPPER ABDOMEN: Normal.    MUSCULOSKELETAL: Demineralized skeleton. No acute fracture detected. Degenerative changes of the spine.      Impression    IMPRESSION:  1.  No pulmonary embolus.  2.  Tortuous mildly dilated thoracic aorta measures up to 4.0 cm in the ascending portion.  3.  Cardiomegaly and evidence for right heart dysfunction.  4.  Emphysema.       Bonilla Damon MD  Hospitalist  [facility]

## 2021-05-26 NOTE — ED PROVIDER NOTES
History   Chief Complaint:  Fall and Shortness of Breath      Thierry Samuel is a 86 year old male with history of COPD and CHF on Warfarin who presents with injuries following a fall. Earlier tonight, while getting out of bed, he tripped and fell, hitting his face and left arm on his night stand. He landed on his knees. No loss of consciousness. Since then, he has developed shortness of breath and upper back pain, which is worst on the left. He uses oxygen as needed at home and did not use his CPAP machine today. He received his second dose of the Pfizer COVID vaccine 4/23/21. He recently traveled from [facility] to live with his daughter here.    Review of Systems   Constitutional: Negative for fever.   Respiratory: Positive for shortness of breath.    Musculoskeletal: Positive for back pain.   All other systems reviewed and are negative.        Allergies:  The patient has no known allergies.     Medications:  Albuterol inhaler  Atorvastatin   Furosemide  Losartan  Potassium chloride  Spiriva  Warfarin   Fluticasone   Famotidine   Salbutamol   Ipatropium     Past Medical History:    CHF  COPD  No history of atrial fibrillation.     Past Surgical History:    Appendectomy  Cardiac surgery    Social History:  Recently moved from [facility] to stay with daughter  Accompanied by daughter     Physical Exam     Patient Vitals for the past 24 hrs:   BP Temp Pulse Resp SpO2   05/26/21 0400 -- -- 63 26 98 %   05/26/21 0310 135/85 -- -- -- --   05/26/21 0250 -- -- 60 22 98 %   05/26/21 0200 (!) 146/72 -- 60 22 98 %   05/26/21 0145 (!) 147/64 -- 60 29 98 %   05/26/21 0135 -- -- 60 24 99 %   05/26/21 0120 136/71 -- 61 -- 99 %   05/26/21 0100 -- -- 62 24 98 %   05/26/21 0009 -- 98  F (36.7  C) -- -- --   05/26/21 0005 137/66 -- 61 28 97 %     Physical Exam  Constitutional:       Appearance: He is well-developed. He is diaphoretic.   HENT:      Right Ear: External ear normal.      Left Ear: External ear normal.       Mouth/Throat:      Mouth: Mucous membranes are moist.      Pharynx: Oropharynx is clear. No oropharyngeal exudate or posterior oropharyngeal erythema.   Eyes:      General: No scleral icterus.     Extraocular Movements: Extraocular movements intact.      Conjunctiva/sclera: Conjunctivae normal.      Pupils: Pupils are equal, round, and reactive to light.      Comments: Ecchymosis under left orbit in the upper cheek.   Neck:      Musculoskeletal: Normal range of motion and neck supple.      Vascular: No JVD.   Cardiovascular:      Rate and Rhythm: Normal rate and regular rhythm.      Heart sounds: Normal heart sounds. No murmur. No friction rub. No gallop.    Pulmonary:      Effort: Respiratory distress present.      Breath sounds: No stridor. Wheezing present. No rhonchi or rales.      Comments: Diminished BS bilaterally with expiratory wheezing. No signs of chest trauma. Inc RR on exam with retractions noted.  Abdominal:      General: Bowel sounds are normal. There is no distension.      Palpations: Abdomen is soft. There is no mass.      Tenderness: There is no abdominal tenderness.   Musculoskeletal: Normal range of motion.      Comments: B knee abrasions. No midline spinal tenderness to palpation.   Lymphadenopathy:      Cervical: No cervical adenopathy.   Skin:     General: Skin is warm.      Capillary Refill: Capillary refill takes less than 2 seconds.      Findings: No rash.   Neurological:      General: No focal deficit present.      Mental Status: He is alert.      Cranial Nerves: No cranial nerve deficit.           Emergency Department Course   ECG  ECG taken at 0039, ECG read at 0045  Ventricular-paced rhythm   Rate 60 bpm. WY interval * ms. QRS duration 180 ms. QT/QTc 478/478 ms. P-R-T axes * -59 97.     Imaging:  CT Head wo contrast   IMPRESSION:  1.  No acute intracranial process.   2.  Age related changes described above  Reading per radiology    CT Chest Pulmonary Embolism w contrast   IMPRESSION:    1.  No pulmonary embolus.   2.  Tortuous mildly dilated thoracic aorta measures up to 4.0 cm in the ascending portion.   3.  Cardiomegaly and evidence for right heart dysfunction.   4.  Emphysema  Reading per radiology    XR Chest Port 1 view   IMPRESSION: Cardiomegaly. Pulmonary vascularity normal. Slight interstitial prominence may represent mild edema. Subsegmental atelectasis in the bases. No airspace infiltrates or pleural effusions. Right-sided pacemaker lead tip in the right ventricle  Reading per radiology    Laboratory:  CBC: WBC 6.2, HGB 13.6,    CMP: glucose 119 (H), calcium 8.4 (L) o/w WNL (Creatinine 1.02)     Troponin (Collected 0047): <0.015    INR: 1.45 (H)    Nt probnp inpatient: 872    UA with microscopic: WNL     Asymptomatic COVID19 Virus PCR by nasopharyngeal swab: negative    Emergency Department Course:  Reviewed:  I reviewed nursing notes, vitals, past medical history and care everywhere    Assessments:  0034 I obtained history and examined the patient as noted above.   0315 I rechecked the patient and explained findings. He states that he can breathe better.    0505    I rechecked the patient.     Consults:  0422 I spoke to Dr. Damon of the hospitalist service.     Interventions:  0057 Duoneb, 3 mL, nebulization  0100 Duoneb, 3 mL, nebulization  0111  mL IV Bolus  0111 Solu-medrol, 125 mg, IV  0119 Duoneb, 3 mL, nebulization   0319 Duoneb, 6 mL, nebulization  0340 Dilaudid 0.5mg IV injection    Disposition:  The patient was admitted to the hospital under the care of Dr. Damon.     Impression & Plan   Medical Decision Making:     Thierry Samuel is a 86 year old male who presents with his daughter for evaluation of fall and shortness of breath. Appears that patient had just recently arrived from [facility] and was getting out of bed, when he tripped and fell. There is ecchymosis on the left upper cheek. He is on Coumadin, so we did do a CT to make sure he does not have  any intracranial hemorrhage. He has some abrasions to his knees but did not require any imaging. CT was negative, but he is also COPD patient with tachypnea on exam. He was diminished and not really moving air very much. There was some respiratory wheezing faintly. He was given 3 Duonebs and Solu-medrol. He seemed to do better, although his work of breathing has not decreased. Rates have been in the 20s with some belly breathing even after nebs. After monitoring him for awhile, we did repeat the Duonebs but also put him on BIPAP to decrease the work of breathing. He seems to be tolerating BIPAP quite well and fell asleep without any respiratory distress. Not sure what caused this exacerbation, although, it is hard to explain with the fall as well. His daughter states that he has been feeling fine and has not voiced any concern prior to what happened tonight. He will be admitted to the ICU given his use of BIPAP. He is admitted to Dr. Damon of the hospitalist service. He is negative on his COVID swab and has been recently vaccinated. On my recheck, patient is comfortable and has no further complaints.     Critical Care Time: was 30 minutes for this patient excluding procedures    Covid-19  Thierry Samuel was evaluated during a global COVID-19 pandemic, which necessitated consideration that the patient might be at risk for infection with the SARS-CoV-2 virus that causes COVID-19.   Applicable protocols for evaluation were followed during the patient's care. COVID-19 was considered as part of the patient's evaluation. The plan for testing is: a test was obtained during this visit.    Diagnosis:    ICD-10-CM    1. COPD exacerbation (H)  J44.1    2. Contusion of face, scalp and neck, initial encounter  S00.83XA     S00.03XA     S10.93XA      Scribe Disclosure:  Jaelyn SUN, am serving as a scribe at 12:32 AM on 5/26/2021 to document services personally performed by Jamari Rae MD based on my  observations and the provider's statements to me.              Jamari Rae MD  05/26/21 0628

## 2021-05-26 NOTE — ED NOTES
[facility]  ED Nurse Handoff Report    Thierry Samuel is a 86 year old male   ED Chief complaint: Fall and Shortness of Breath  . ED Diagnosis:   Final diagnoses:   COPD exacerbation (H)   Contusion of face, scalp and neck, initial encounter     Allergies: No Known Allergies    Code Status: Full Code  Activity level - Baseline/Home:  Independent. Activity Level - Current:   Stand by Assist. Lift room needed: No. Bariatric: No   Needed: Yes, if daughter is not here pt needs   Isolation: No. Infection: Not Applicable.     Vital Signs:   Vitals:    05/26/21 0400 05/26/21 0420 05/26/21 0425 05/26/21 0430   BP:  (!) 162/78  (!) 152/82   Pulse: 63 63 63 61   Resp: 26 17 20 18   Temp:       SpO2: 98% 97% 97% 96%       Cardiac Rhythm:  ,      Pain level:    Patient confused: No. Patient Falls Risk: Yes.   Elimination Status: Has voided , urinal at bedside.   Patient Report - Initial Complaint: SOB, fall with bruising. Focused Assessment:       01:00 Respiratory Respiratory - Respiratory WDL: .WDL except; all; breath sounds (Wheezing bilateral, diminished in bases, c/o increase in SOB since this AM when he fell and hit head on night stand. Mental status WNL.) Rhythm/Pattern, Respiratory: shortness of breath; tachypnea (RR28) Cough Frequency: frequent  Cough Type: fair; dry    Pt does have a hx of COPD - emphysema, coughing spells result in slight cyanosis to lips.   Pt mildly improved on BiPAP, decrease in RR to 16-18, slight improvement in work of breathing, resting/sleeping with eyes closed, daughter remains at bedside.   Tests Performed: EKG, labs, CT & Xray. Abnormal Results:   Labs Ordered and Resulted from Time of ED Arrival Up to the Time of Departure from the ED   CBC WITH PLATELETS DIFFERENTIAL - Abnormal; Notable for the following components:       Result Value    RBC Count 4.34 (*)      (*)     MCHC 31.1 (*)     All other components within normal limits    COMPREHENSIVE METABOLIC PANEL - Abnormal; Notable for the following components:    Glucose 119 (*)     Calcium 8.4 (*)     All other components within normal limits   INR - Abnormal; Notable for the following components:    INR 1.45 (*)     All other components within normal limits   TROPONIN I   NT PROBNP INPATIENT   ROUTINE UA WITH MICROSCOPIC REFLEX TO CULTURE   SARS-COV-2 (COVID-19) VIRUS RT-PCR   VITAL SIGNS   PULSE OXIMETRY NURSING   CARDIAC CONTINUOUS MONITORING   PERIPHERAL IV CATHETER     CT Chest Pulmonary Embolism w Contrast   Final Result   IMPRESSION:   1.  No pulmonary embolus.   2.  Tortuous mildly dilated thoracic aorta measures up to 4.0 cm in the ascending portion.   3.  Cardiomegaly and evidence for right heart dysfunction.   4.  Emphysema.      CT Head w/o Contrast   Final Result   IMPRESSION:   1.  No acute intracranial process.   2.  Age related changes described above.      XR Chest Port 1 View   Final Result   IMPRESSION: Cardiomegaly. Pulmonary vascularity normal. Slight interstitial prominence may represent mild edema. Subsegmental atelectasis in the bases. No airspace infiltrates or pleural effusions. Right-sided pacemaker lead tip in the right ventricle.      .   Treatments provided: Duonebs, solumedrol, monitoring, BiPAP  Family Comments: Daughter at bedside for interpretation and support.   OBS brochure/video discussed/provided to patient:  N/A  ED Medications:   Medications   methylPREDNISolone sodium succinate (solu-MEDROL) injection 125 mg (125 mg Intravenous Given 5/26/21 0111)   ipratropium - albuterol 0.5 mg/2.5 mg/3 mL (DUONEB) neb solution 3 mL (3 mLs Nebulization Given 5/26/21 0119)   0.9% sodium chloride BOLUS (0 mLs Intravenous Stopped 5/26/21 0318)   iopamidol (ISOVUE-370) solution 500 mL (93 mLs Intravenous Given 5/26/21 0305)   ipratropium - albuterol 0.5 mg/2.5 mg/3 mL (DUONEB) neb solution 6 mL (6 mLs Nebulization Given 5/26/21 0319)   HYDROmorphone (PF) (DILAUDID)  injection 0.5 mg (0.5 mg Intravenous Given 5/26/21 0340)     Drips infusing:  No  For the majority of the shift, the patient's behavior Green. Interventions performed were NA.    Sepsis treatment initiated: No     Patient tested for COVID 19 prior to admission: YES    ED Nurse Name/Phone Number: Rupesh Saravia RN,   4:36 AM

## 2021-05-26 NOTE — PHARMACY-ADMISSION MEDICATION HISTORY
Admission medication history interview status for this patient is complete. See [facility] admission navigator for allergy information, prior to admission medications and immunization status.     Medication history interview done, indicate source(s): Patient's daughter (present in ICU room).  Medication history resources (including written lists, pill bottles, clinic record): none.  Pharmacy: no specific pharmacy, daughter would like written prescriptions on discharge so she can find a pharmacy which will give her a better deal.    Changes made to PTA medication list:  Added:  Omeprazole, rifaximine  Deleted: albuterol nebs, robitussin  Changed: albuterol inhaler, lipitor, lasix, serflu    Actions taken by pharmacist (provider contacted, etc): none.    Additional medication history information: none.    Medication reconciliation/reorder completed by provider prior to medication history?  yes   (Y/N)     For patients on insulin therapy: no    Prior to Admission medications    Medication Sig Last Dose Taking? Auth Provider   ALBUTEROL IN Inhale 6 puffs into the lungs every 8 hours 6 puffs q8h scheduled (Butovent/Salbutamol) 5/25/2021 at Unknown time Yes Unknown, Entered By History   Atorvastatin Calcium (LIPITOR PO) Take 20 mg by mouth every morning  5/25/2021 at Unknown time Yes Reported, Patient   Furosemide (LASIX PO) Take 40 mg by mouth every evening 5/25/2021 at pm Yes Unknown, Entered By History   furosemide (LASIX) 80 MG tablet Take 80 mg by mouth every morning 5/25/2021 at am Yes Unknown, Entered By History   hypromellose-dextran (ARTIFICAL TEARS) 0.1-0.3 % SOLN ophthalmic solution Apply 2-3 drops to eye every hour as needed for dry eyes prn Yes Dane Leon MD   losartan (COZAAR) 25 MG tablet Take 25 mg by mouth daily 5/25/2021 at Unknown time Yes Unknown, Entered By History   omeprazole (PRILOSEC) 20 MG DR capsule Take 20 mg by mouth 2 times daily 5/25/2021 at am Yes Unknown, Entered By History    POTASSIUM CHLORIDE PO Take 500 mg by mouth daily (medication name is cloruro de potasico 500 mg) 5/25/2021 at Unknown time Yes Unknown, Entered By History   rifaximin (XIFAXAN) 200 MG tablet Take 400 mg by mouth 2 times daily Rifaxol (Rifaximina) Uses 200mg tabs, 2 tabs bid (4 tabs/day) on first seven days of each month for diverticulosis. 5/7/2021 at pm Yes Unknown, Entered By History   tiotropium (SPIRIVA) 18 MCG inhalation capsule Inhale 18 mcg into the lungs daily 5/25/2021 at Unknown time Yes Reported, Patient   UNABLE TO FIND Inhale 2 puffs into the lungs 2 times daily MEDICATION NAME: Serflu' (salmeterol xinafoate 25 mcg fluticasone propionate 250 mcg)  Inhaler 2 puffs bid. 5/25/2021 at Unknown time Yes Unknown, Entered By History   warfarin (COUMADIN) 2.5 MG tablet Take 2.5-5 mg by mouth daily Takes 2.5 mg four times per week, 5mg three times per week. 5/25/2021 at 5mg Yes Unknown, Entered By History

## 2021-05-26 NOTE — ED NOTES
Pt seems to be liking BiPAP with a decrease in RR to about 16-18, his work of breathing appears mildly improved and he is now resting with eyes closed.

## 2021-05-26 NOTE — ED NOTES
"Pt remains with bilateral expiratory wheezing, but aeration increased in upper lobes and L side greater than R, remains diminished in bilateral bases. Pt reports that he still feels SOB but that he feels \"a little better than when he got here.\" Pts respiratory rate has decreased to about 22-24, however his work of breathing is relatively unchanged, MD is in room.  "

## 2021-05-26 NOTE — PHARMACY-ANTICOAGULATION SERVICE
Clinical Pharmacy - Warfarin Dosing Consult     Pharmacy has been consulted to manage this patient s warfarin therapy.  Indication: Atrial Fibrillation  Therapy Goal: INR 2-3  Warfarin Prior to Admission: Yes  Warfarin PTA Regimen: 2.5 mg four times per week, 5mg three times per week (last dose pta - yesterday = 5mg).  Significant drug interactions: Lovenox 40mg/day.  Additional info: Hx of GI bleed.    INR   Date Value Ref Range Status   05/26/2021 1.45 (H) 0.86 - 1.14 Final   09/30/2018 2.00 (H) 0.86 - 1.14 Final       Recommend warfarin 5 mg today.  Pharmacy will monitor Thierry Teliz Lizize daily and order warfarin doses to achieve specified goal.      Please contact pharmacy as soon as possible if the warfarin needs to be held for a procedure or if the warfarin goals change.

## 2021-05-26 NOTE — PROGRESS NOTES
Patient admitted after midnight by Dr. Bonilla Damon. Reviewed admission and care plan. 0800 VBG pH 7.35/pCO2 51. Mentation appropriate. Ok to trial off BiPAP. Discontinue Famotidine since on omeprazole. Discontinue Solu-medrol and start prednisone 40 mg daily 5/27. Monitor urine output with increased diuresis. Daily weights. Strict I/Os.

## 2021-05-26 NOTE — PROGRESS NOTES
Pt on and off BiPAP therapy. A BiPAP of 10/5 @ 35% via the mask for an increase in WOB and/or SOB. The bridge of the nose is protected with gel pad in place. Pt is tolerating it well. Patient will continue to receive Duoneb QID and Breo every day. Will continue to monitor and assess the pt's current respiratory status and needs.    Anayeli Hidalgo, RT

## 2021-05-26 NOTE — ED TRIAGE NOTES
Patient reports getting out of bed tripping hitting L arm and face on night stand. Patient hx COPD     Since then has been complaining of SOB,  Upper back pain.   Denies using oxygen at home

## 2021-05-26 NOTE — PROGRESS NOTES
A BiPAP of  10/5 @ 35% was applied to the pt via the mask for an increase in WOB and/or SOB. The bridge of the nose looks good and intact. Pt is tolerating it well. Will continue to monitor and assess the pt's current respiratory status and needs.    /85   Pulse 63   Temp 98  F (36.7  C)   Resp 26   SpO2 98%             Vinny Helton, RT

## 2021-05-27 LAB
ANION GAP SERPL CALCULATED.3IONS-SCNC: 2 MMOL/L (ref 3–14)
BUN SERPL-MCNC: 22 MG/DL (ref 7–30)
CALCIUM SERPL-MCNC: 8.3 MG/DL (ref 8.5–10.1)
CHLORIDE SERPL-SCNC: 105 MMOL/L (ref 94–109)
CO2 SERPL-SCNC: 29 MMOL/L (ref 20–32)
CREAT SERPL-MCNC: 0.84 MG/DL (ref 0.66–1.25)
ERYTHROCYTE [DISTWIDTH] IN BLOOD BY AUTOMATED COUNT: 13.2 % (ref 10–15)
GFR SERPL CREATININE-BSD FRML MDRD: 79 ML/MIN/{1.73_M2}
GLUCOSE BLDC GLUCOMTR-MCNC: 105 MG/DL (ref 70–99)
GLUCOSE BLDC GLUCOMTR-MCNC: 121 MG/DL (ref 70–99)
GLUCOSE BLDC GLUCOMTR-MCNC: 135 MG/DL (ref 70–99)
GLUCOSE BLDC GLUCOMTR-MCNC: 150 MG/DL (ref 70–99)
GLUCOSE SERPL-MCNC: 126 MG/DL (ref 70–99)
HCT VFR BLD AUTO: 41.2 % (ref 40–53)
HGB BLD-MCNC: 13.3 G/DL (ref 13.3–17.7)
INR PPP: 1.43 (ref 0.86–1.14)
MAGNESIUM SERPL-MCNC: 2.3 MG/DL (ref 1.6–2.3)
MCH RBC QN AUTO: 31.5 PG (ref 26.5–33)
MCHC RBC AUTO-ENTMCNC: 32.3 G/DL (ref 31.5–36.5)
MCV RBC AUTO: 98 FL (ref 78–100)
PLATELET # BLD AUTO: 210 10E9/L (ref 150–450)
POTASSIUM SERPL-SCNC: 3.9 MMOL/L (ref 3.4–5.3)
RBC # BLD AUTO: 4.22 10E12/L (ref 4.4–5.9)
SODIUM SERPL-SCNC: 136 MMOL/L (ref 133–144)
WBC # BLD AUTO: 9.2 10E9/L (ref 4–11)

## 2021-05-27 PROCEDURE — 250N000009 HC RX 250: Performed by: INTERNAL MEDICINE

## 2021-05-27 PROCEDURE — 250N000013 HC RX MED GY IP 250 OP 250 PS 637: Performed by: INTERNAL MEDICINE

## 2021-05-27 PROCEDURE — 999N001017 HC STATISTIC GLUCOSE BY METER IP

## 2021-05-27 PROCEDURE — 250N000012 HC RX MED GY IP 250 OP 636 PS 637: Performed by: INTERNAL MEDICINE

## 2021-05-27 PROCEDURE — 36415 COLL VENOUS BLD VENIPUNCTURE: CPT | Performed by: INTERNAL MEDICINE

## 2021-05-27 PROCEDURE — 94640 AIRWAY INHALATION TREATMENT: CPT | Mod: 76

## 2021-05-27 PROCEDURE — 80048 BASIC METABOLIC PNL TOTAL CA: CPT | Performed by: INTERNAL MEDICINE

## 2021-05-27 PROCEDURE — 85027 COMPLETE CBC AUTOMATED: CPT | Performed by: INTERNAL MEDICINE

## 2021-05-27 PROCEDURE — 999N000157 HC STATISTIC RCP TIME EA 10 MIN

## 2021-05-27 PROCEDURE — 94640 AIRWAY INHALATION TREATMENT: CPT

## 2021-05-27 PROCEDURE — 83735 ASSAY OF MAGNESIUM: CPT | Performed by: INTERNAL MEDICINE

## 2021-05-27 PROCEDURE — 120N000004 HC R&B MS OVERFLOW

## 2021-05-27 PROCEDURE — 250N000011 HC RX IP 250 OP 636: Performed by: INTERNAL MEDICINE

## 2021-05-27 PROCEDURE — 99233 SBSQ HOSP IP/OBS HIGH 50: CPT | Performed by: INTERNAL MEDICINE

## 2021-05-27 PROCEDURE — 85610 PROTHROMBIN TIME: CPT | Performed by: INTERNAL MEDICINE

## 2021-05-27 RX ORDER — FUROSEMIDE 40 MG
80 TABLET ORAL DAILY
Status: DISCONTINUED | OUTPATIENT
Start: 2021-05-28 | End: 2021-05-27

## 2021-05-27 RX ORDER — BENZONATATE 100 MG/1
100 CAPSULE ORAL 3 TIMES DAILY PRN
Status: DISCONTINUED | OUTPATIENT
Start: 2021-05-27 | End: 2021-05-28 | Stop reason: HOSPADM

## 2021-05-27 RX ORDER — LANOLIN ALCOHOL/MO/W.PET/CERES
3 CREAM (GRAM) TOPICAL
Status: DISCONTINUED | OUTPATIENT
Start: 2021-05-27 | End: 2021-05-28 | Stop reason: HOSPADM

## 2021-05-27 RX ORDER — FUROSEMIDE 40 MG
40 TABLET ORAL EVERY EVENING
Status: DISCONTINUED | OUTPATIENT
Start: 2021-05-27 | End: 2021-05-28 | Stop reason: HOSPADM

## 2021-05-27 RX ORDER — FUROSEMIDE 40 MG
80 TABLET ORAL EVERY MORNING
Status: DISCONTINUED | OUTPATIENT
Start: 2021-05-28 | End: 2021-05-28 | Stop reason: HOSPADM

## 2021-05-27 RX ORDER — GUAIFENESIN 600 MG/1
600 TABLET, EXTENDED RELEASE ORAL 2 TIMES DAILY PRN
Status: DISCONTINUED | OUTPATIENT
Start: 2021-05-27 | End: 2021-05-28 | Stop reason: HOSPADM

## 2021-05-27 RX ADMIN — WARFARIN SODIUM 6 MG: 5 TABLET ORAL at 19:32

## 2021-05-27 RX ADMIN — GUAIFENESIN 600 MG: 600 TABLET, EXTENDED RELEASE ORAL at 23:03

## 2021-05-27 RX ADMIN — OMEPRAZOLE 20 MG: 20 CAPSULE, DELAYED RELEASE ORAL at 08:29

## 2021-05-27 RX ADMIN — ATORVASTATIN CALCIUM 20 MG: 10 TABLET, FILM COATED ORAL at 08:29

## 2021-05-27 RX ADMIN — IPRATROPIUM BROMIDE AND ALBUTEROL SULFATE 3 ML: .5; 3 SOLUTION RESPIRATORY (INHALATION) at 11:46

## 2021-05-27 RX ADMIN — FUROSEMIDE 40 MG: 40 TABLET ORAL at 19:32

## 2021-05-27 RX ADMIN — LIDOCAINE 2 PATCH: 560 PATCH PERCUTANEOUS; TOPICAL; TRANSDERMAL at 11:17

## 2021-05-27 RX ADMIN — MELATONIN TAB 3 MG 3 MG: 3 TAB at 01:44

## 2021-05-27 RX ADMIN — OMEPRAZOLE 20 MG: 20 CAPSULE, DELAYED RELEASE ORAL at 19:32

## 2021-05-27 RX ADMIN — FLUTICASONE FUROATE AND VILANTEROL TRIFENATATE 1 PUFF: 200; 25 POWDER RESPIRATORY (INHALATION) at 08:00

## 2021-05-27 RX ADMIN — ENOXAPARIN SODIUM 40 MG: 40 INJECTION SUBCUTANEOUS at 11:18

## 2021-05-27 RX ADMIN — IPRATROPIUM BROMIDE AND ALBUTEROL SULFATE 3 ML: .5; 3 SOLUTION RESPIRATORY (INHALATION) at 08:00

## 2021-05-27 RX ADMIN — ALBUTEROL SULFATE 2.5 MG: 2.5 SOLUTION RESPIRATORY (INHALATION) at 02:33

## 2021-05-27 RX ADMIN — FUROSEMIDE 80 MG: 40 TABLET ORAL at 06:24

## 2021-05-27 RX ADMIN — PREDNISONE 40 MG: 20 TABLET ORAL at 08:29

## 2021-05-27 RX ADMIN — LOSARTAN POTASSIUM 50 MG: 50 TABLET, FILM COATED ORAL at 08:29

## 2021-05-27 RX ADMIN — BENZONATATE 100 MG: 100 CAPSULE ORAL at 02:50

## 2021-05-27 RX ADMIN — IPRATROPIUM BROMIDE AND ALBUTEROL SULFATE 3 ML: .5; 3 SOLUTION RESPIRATORY (INHALATION) at 20:19

## 2021-05-27 ASSESSMENT — ACTIVITIES OF DAILY LIVING (ADL)
ADLS_ACUITY_SCORE: 18
ADLS_ACUITY_SCORE: 18
ADLS_ACUITY_SCORE: 20
ADLS_ACUITY_SCORE: 20
ADLS_ACUITY_SCORE: 18
ADLS_ACUITY_SCORE: 20

## 2021-05-27 NOTE — PLAN OF CARE
ICU End of Shift Summary.  For vital signs and complete assessments, please see documentation flowsheets.     Pertinent assessments: Patient weaned from 2 lpm NC to room air. 02 sats >90%. Mild dyspnea with activity; educated on pursed lip breathing. Educated on IS. Tolerating diet. Pt up in chair, ambulated in room/up to BR with assist of 1 and walker for support.  Daughter at bedside; will bring home c-pap for HS.      Major Shift Events: Weaned 02 to room air when up to chair. Transfer orders placed. Medical Overflow.   Plan (Upcoming Events): Transfer out of ICU when bed available.    Discharge/Transfer Needs: TBD; pt hopes to discharge to home tomorrow. Will place SWS consult for discharge needs regarding possible home 02 (pt does not have insurance to cover).     Bedside Shift Report Completed :   Bedside Safety Check Completed:    Addendum:   Patient ambulated in [place] with SBA x1 and walker/gait Belt.     Patient has been assessed for Home Oxygen needs. Oxygen readings:    *Pulse oximetry (SpO2) = 94% on room air at rest while awake.    *Oxygen not needed at rest    *SpO2 = 84% on room air during activity/with exercise.    *SpO2 improved to 90% on 2 liters/minute during activity/with exercise.

## 2021-05-27 NOTE — PROGRESS NOTES
RT Note:    Pt was placed on CPAP at 8cm H2O, for SUSY.  He does have a home machine and family will bring it in today.    Pt's BS were coarse with sat's in the 90's.      Will continue to follow and assess.    Melissa Ford, RT

## 2021-05-27 NOTE — PROGRESS NOTES
[facility]  Hospitalist Progress Note    Assessment & Plan   Thierry Samuel is a 86 year old Irish-speaking male who lives in [place], but visits his daughter for 3 months every year with PMH including severe COPD on 3 L via nasal cannula as needed, SUSY using CPAP, severe pulmonary HTN, diastolic CHF, A. fib on warfarin s/pacemaker placement,p HTN, HLD, history of GI bleed, and history chronic left leg wound who presents with his daughter for evaluation of back pain and shortness of breath.    Patient admitted to the ICU for continuous BiPAP. Started on IV steroids and diuretics. Improved in respiratory status. Titrated to room air and CPAP at night. Transferred to the floor 5/27.     Acute on chronic hypoxemic respiratory failure secondary to acute severe COPD exacerbation and obstructive sleep apnea: Patient is visiting from [place] which complicates care.  He is currently on 1 to 3 L of oxygen as needed.  Family reports he uses infrequently.  Currently does not have oxygen with him.  Initially required continuous BiPAP therapy.  As respiratory status improved CPAP was ordered  -CPAP q HS/naps  -Oxygen titrate to goal greater than 89%.  Patient titrated to room air 5/27.  Ambulatory pulse ox testing  -IV Solu-Medrol changed to oral prednisone 40 mg daily.  Suspect will need prednisone taper  -Diuresed with IV diuretics.  Transition to PTA Lasix 80 mg every morning and 40 mg every afternoon on 5/27  -Pulmonary hygiene fluticasone/vilanterol, DuoNebs 4 times daily    Fall with left-sided facial trauma and back pain: Patient fell over in bed prior to hospitalization hitting his forehead on the dresser and left arm.  Hematoma left side of his face.  Noncontrast head CT negative for acute pathology or hemorrhage.  CT chest with IV contrast negative for rib fractures  -Supportive cares with acetaminophen as needed and lidocaine.    Severe pulmonary hypertension, chronic diastolic  congestive heart failure: TTE 9/2018 showed severe pulmonary hypertension likely secondary SUSY and severe COPD.  proBNP not elevated.  Initially on IV Lasix.  -PTA losartan.  PTA Lasix  -Monitor electrolyte replacement  -Daily weights and strict I's and O's    Hyperlipidemia: Atorvastatin 20 mg daily    Hypertension: Started 50 mg daily    GERD; Hx of GI bleed: Omeprazole 20 mg twice daily    Subtherapeutic INR; Atrial fibrillation s/p pacemaker: Pharmacy consulted to dose.  DVT prophylaxis with Lovenox till INR therapeutic. Rate controlled.     FEN: Oral hydration; monitor; regular  Activity: PT  DVT Prophylaxis: Enoxaparin (Lovenox) subcutaneous until warfarin therapeutic  Family update: Yes, daughter at bedside.     Code Status: Full Code    Expected discharge: 1-2 days, recommended to TBD once oxygen needs stable.    Nataliia Hand, DO    Text Page (7am - 6pm, M-F)    Interval History   Croatian speaking. Daughter at bedside. Preferred daughter to translate. No chest pain or palpitations. SOB improved. Dry non-productive cough. Afebrile. No abdominal pain. Eating/drinking well. No n/v. Overall feeling much better. Discussed with nursing.     -Data reviewed today: I reviewed all new labs and imaging results over the last 24 hours. I personally reviewed     Physical Exam   Temp: 98.1  F (36.7  C) Temp src: Oral BP: 111/59 Pulse: 66   Resp: 26 SpO2: 95 % O2 Device: None (Room air) Oxygen Delivery: 2 LPM  Vitals:    05/26/21 1012   Weight: 91.2 kg (201 lb 1 oz)     Vital Signs with Ranges  Temp:  [98.1  F (36.7  C)-98.8  F (37.1  C)] 98.1  F (36.7  C)  Pulse:  [59-70] 66  Resp:  [13-31] 26  BP: (101-146)/(46-93) 111/59  FiO2 (%):  [35 %] 35 %  SpO2:  [93 %-100 %] 95 %  I/O last 3 completed shifts:  In: 360 [P.O.:360]  Out: 2500 [Urine:2500]    Constitutional: Awake, alert, cooperative, no apparent distress. Non-toxic. Appears stated age.   HEENT: Atraumatic. Normocephalic. Conjunctiva non-injected. Sclera anicteric.  MMM.   Respiratory: Moves air bilaterally. No use of accessory respiratory muscles. Faint crackles in middle bilateral lungs with occasional wheezing.   Cardiovascular: V-paced. Bradycardic. Normal S1 and S2, and no murmur noted  GI: Round, firm, non-tender, distended. Normoactive BS+. No rebound tenderness or guarding.   Skin/Integumen: No rashes, no cyanosis, trace edema. Ecchymosis on lateral aspect of face (fall).     Medications     - MEDICATION INSTRUCTIONS -       Warfarin Therapy Reminder         atorvastatin  20 mg Oral Daily     enoxaparin ANTICOAGULANT  40 mg Subcutaneous Q24H     fluticasone-vilanterol  1 puff Inhalation Daily     furosemide  40 mg Oral QPM     [START ON 5/28/2021] furosemide  80 mg Oral Daily     ipratropium - albuterol 0.5 mg/2.5 mg/3 mL  3 mL Nebulization 4x daily     lidocaine  2 patch Transdermal Q24H     lidocaine   Transdermal Q8H     losartan  50 mg Oral Daily     omeprazole  20 mg Oral BID AC     predniSONE  40 mg Oral Daily     warfarin ANTICOAGULANT  6 mg Oral ONCE at 18:00     Data   Recent Labs   Lab 05/27/21  0612 05/26/21  1118 05/26/21  0047   WBC 9.2  --  6.2   HGB 13.3  --  13.6   MCV 98  --  101*     --  250   INR 1.43*  --  1.45*     --  138   POTASSIUM 3.9  --  4.0   CHLORIDE 105  --  105   CO2 29  --  30   BUN 22  --  21   CR 0.84 0.72 1.02   ANIONGAP 2*  --  3   CAMPOS 8.3*  --  8.4*   *  --  119*   ALBUMIN  --   --  3.6   PROTTOTAL  --   --  7.0   BILITOTAL  --   --  0.4   ALKPHOS  --   --  110   ALT  --   --  24   AST  --   --  16   TROPI  --   --  <0.015     No results found for this or any previous visit (from the past 24 hour(s)).

## 2021-05-27 NOTE — PLAN OF CARE
ICU End of Shift Summary.  For vital signs and complete assessments, please see documentation flowsheets.     Pertinent assessments: AAO, calm, cooperative and stoic; VSS, afibrile; daughter at bedside to interpret; LS diminished;   Major Shift Events: Restless most of night; Bed to Chair; BiPap to NC back to Bipap; Melatonin given; new order for tessalon and Nebs given X2  Plan (Upcoming Events): O2 needs return to baseline  Discharge/Transfer Needs: to home    Bedside Shift Report Completed : Y  Bedside Safety Check Completed: Y

## 2021-05-28 ENCOUNTER — APPOINTMENT (OUTPATIENT)
Dept: PHYSICAL THERAPY | Facility: CLINIC | Age: 86
End: 2021-05-28
Attending: INTERNAL MEDICINE
Payer: MEDICAID

## 2021-05-28 VITALS
WEIGHT: 201.06 LBS | BODY MASS INDEX: 37 KG/M2 | RESPIRATION RATE: 18 BRPM | TEMPERATURE: 98.8 F | SYSTOLIC BLOOD PRESSURE: 111 MMHG | OXYGEN SATURATION: 95 % | HEIGHT: 62 IN | HEART RATE: 60 BPM | DIASTOLIC BLOOD PRESSURE: 64 MMHG

## 2021-05-28 LAB
ANION GAP SERPL CALCULATED.3IONS-SCNC: 4 MMOL/L (ref 3–14)
BUN SERPL-MCNC: 26 MG/DL (ref 7–30)
CALCIUM SERPL-MCNC: 8.3 MG/DL (ref 8.5–10.1)
CHLORIDE SERPL-SCNC: 104 MMOL/L (ref 94–109)
CO2 SERPL-SCNC: 30 MMOL/L (ref 20–32)
CREAT SERPL-MCNC: 0.78 MG/DL (ref 0.66–1.25)
GFR SERPL CREATININE-BSD FRML MDRD: 82 ML/MIN/{1.73_M2}
GLUCOSE SERPL-MCNC: 99 MG/DL (ref 70–99)
INR PPP: 1.7 (ref 0.86–1.14)
POTASSIUM SERPL-SCNC: 3.8 MMOL/L (ref 3.4–5.3)
SODIUM SERPL-SCNC: 138 MMOL/L (ref 133–144)

## 2021-05-28 PROCEDURE — 250N000011 HC RX IP 250 OP 636: Performed by: INTERNAL MEDICINE

## 2021-05-28 PROCEDURE — 94640 AIRWAY INHALATION TREATMENT: CPT | Mod: 76

## 2021-05-28 PROCEDURE — 94640 AIRWAY INHALATION TREATMENT: CPT

## 2021-05-28 PROCEDURE — 97530 THERAPEUTIC ACTIVITIES: CPT | Mod: GP | Performed by: PHYSICAL THERAPIST

## 2021-05-28 PROCEDURE — 36415 COLL VENOUS BLD VENIPUNCTURE: CPT | Performed by: INTERNAL MEDICINE

## 2021-05-28 PROCEDURE — 250N000013 HC RX MED GY IP 250 OP 250 PS 637: Performed by: INTERNAL MEDICINE

## 2021-05-28 PROCEDURE — 80048 BASIC METABOLIC PNL TOTAL CA: CPT | Performed by: INTERNAL MEDICINE

## 2021-05-28 PROCEDURE — 97161 PT EVAL LOW COMPLEX 20 MIN: CPT | Mod: GP | Performed by: PHYSICAL THERAPIST

## 2021-05-28 PROCEDURE — 85610 PROTHROMBIN TIME: CPT | Performed by: INTERNAL MEDICINE

## 2021-05-28 PROCEDURE — 250N000009 HC RX 250: Performed by: INTERNAL MEDICINE

## 2021-05-28 PROCEDURE — 99239 HOSP IP/OBS DSCHRG MGMT >30: CPT | Performed by: INTERNAL MEDICINE

## 2021-05-28 PROCEDURE — 250N000012 HC RX MED GY IP 250 OP 636 PS 637: Performed by: INTERNAL MEDICINE

## 2021-05-28 PROCEDURE — 97116 GAIT TRAINING THERAPY: CPT | Mod: GP | Performed by: PHYSICAL THERAPIST

## 2021-05-28 PROCEDURE — 999N000157 HC STATISTIC RCP TIME EA 10 MIN

## 2021-05-28 RX ORDER — IPRATROPIUM BROMIDE AND ALBUTEROL SULFATE 2.5; .5 MG/3ML; MG/3ML
3 SOLUTION RESPIRATORY (INHALATION) 4 TIMES DAILY PRN
Qty: 90 ML | Refills: 0 | Status: SHIPPED | OUTPATIENT
Start: 2021-05-28 | End: 2021-08-10

## 2021-05-28 RX ORDER — LIDOCAINE 40 MG/G
CREAM TOPICAL
Qty: 30 G | Refills: 0 | Status: SHIPPED | OUTPATIENT
Start: 2021-05-28 | End: 2021-08-10

## 2021-05-28 RX ORDER — WARFARIN SODIUM 5 MG/1
5 TABLET ORAL
Status: DISCONTINUED | OUTPATIENT
Start: 2021-05-28 | End: 2021-05-28 | Stop reason: HOSPADM

## 2021-05-28 RX ORDER — PREDNISONE 20 MG/1
40 TABLET ORAL DAILY
Qty: 8 TABLET | Refills: 0 | Status: SHIPPED | OUTPATIENT
Start: 2021-05-29 | End: 2021-06-02

## 2021-05-28 RX ORDER — WARFARIN SODIUM 2.5 MG/1
2.5-5 TABLET ORAL DAILY
COMMUNITY
Start: 2021-05-28 | End: 2021-08-10

## 2021-05-28 RX ADMIN — OMEPRAZOLE 20 MG: 20 CAPSULE, DELAYED RELEASE ORAL at 08:33

## 2021-05-28 RX ADMIN — LOSARTAN POTASSIUM 50 MG: 50 TABLET, FILM COATED ORAL at 08:34

## 2021-05-28 RX ADMIN — ENOXAPARIN SODIUM 40 MG: 40 INJECTION SUBCUTANEOUS at 11:58

## 2021-05-28 RX ADMIN — IPRATROPIUM BROMIDE AND ALBUTEROL SULFATE 3 ML: .5; 3 SOLUTION RESPIRATORY (INHALATION) at 12:08

## 2021-05-28 RX ADMIN — FUROSEMIDE 80 MG: 40 TABLET ORAL at 08:33

## 2021-05-28 RX ADMIN — ATORVASTATIN CALCIUM 20 MG: 10 TABLET, FILM COATED ORAL at 08:34

## 2021-05-28 RX ADMIN — PREDNISONE 40 MG: 20 TABLET ORAL at 08:34

## 2021-05-28 RX ADMIN — LIDOCAINE 2 PATCH: 560 PATCH PERCUTANEOUS; TOPICAL; TRANSDERMAL at 08:34

## 2021-05-28 RX ADMIN — IPRATROPIUM BROMIDE AND ALBUTEROL SULFATE 3 ML: .5; 3 SOLUTION RESPIRATORY (INHALATION) at 07:53

## 2021-05-28 RX ADMIN — FLUTICASONE FUROATE AND VILANTEROL TRIFENATATE 1 PUFF: 200; 25 POWDER RESPIRATORY (INHALATION) at 07:55

## 2021-05-28 ASSESSMENT — ACTIVITIES OF DAILY LIVING (ADL)
ADLS_ACUITY_SCORE: 20

## 2021-05-28 NOTE — PROGRESS NOTES
Patient has been assessed for Home Oxygen needs. Oxygen readings:    *Pulse oximetry (SpO2) = 97% on room air at rest while awake.    *SpO2 improved to 99% on 0liters/minute at rest.    *SpO2 = 77% on room air during activity/with exercise.    *SpO2 improved to 93% on 6liters/minute during activity/with exercise.

## 2021-05-28 NOTE — PROGRESS NOTES
Oxygen Documentation:   I certify that this patient, Thierry Samuel has been under my care (or a nurse practitioner or physican's assistant working with me). This is the face-to-face encounter for oxygen medical necessity.      Thierry Samuel is now in a chronic stable state and continues to require supplemental oxygen. Patient has continued oxygen desaturation due to COPD J44.9.    Alternative treatment(s) tried or considered and deemed clinically infective for treatment of COPD J44.9 include nebulizers, inhalers and steroids.  If portability is ordered, is the patient mobile within the home? yes    **Patients who qualify for home O2 coverage under the CMS guidelines require ABG tests or O2 sat readings obtained closest to, but no earlier than 2 days prior to the discharge, as evidence of the need for home oxygen therapy. Testing must be performed while patient is in the chronic stable state. See notes for O2 sats.**    Patient has been assessed for Home Oxygen needs. Oxygen readings:     *Pulse oximetry (SpO2) = 94% on room air at rest while awake.     *Oxygen not needed at rest     *SpO2 = 84% on room air during activity/with exercise.     *SpO2 improved to 90% on 2 liters/minute during activity/with exercise.

## 2021-05-28 NOTE — DISCHARGE SUMMARY
[facility]  Discharge Summary Hospitalist    Date of Admission:  5/26/2021  Date of Discharge:  5/28/2021  Discharging Provider: Nataliia Hand DO  Date of Service (when I saw the patient): 05/28/21    Discharge Diagnoses    Acute on chronic hypoxemic respiratory failure  Chronic diastolic congestive heart failure with acute exacerbation  Severe COPD with acute exacerbation  Shortness of apnea  Fall with trauma to the left side of the face and back pain  Severe pulmonary hypertension  Hyperlipidemia  Hypertension  GERD with history of GI bleed  Subtherapeutic INR with atrial fibrillation status post pacemaker    History of Present Illness   Thierry Samuel is a 86 year old Japanese-speaking male who lives in [place], but visits his daughter for 3 months every year with PMH including severe COPD on 3 L via nasal cannula as needed, SUSY using CPAP, severe pulmonary HTN, diastolic CHF, A. fib on warfarin s/pacemaker placement,p HTN, HLD, history of GI bleed, and history chronic left leg wound who presents with his daughter for evaluation of back pain and shortness of breath. Patient admitted to the ICU for continuous BiPAP. Started on IV steroids and diuretics.    Hospital Course   Thierry Samuel was admitted on 5/26/2021.  The following problems were addressed during his hospitalization:    Acute on chronic hypoxic respiratory failure secondary to acute severe COPD exacerbation complicated by SUSY, severe pulmonary hypertension, and acute on chronic diastolic CHF: Patient admitted to the intensive care unit for continuous BiPAP therapy.  He was started on IV steroids, continuous BiPAP, and pulmonary hygiene with nebulizers.  Diuresis with IV Lasix.  Patient had good urine output and improvement in her respiratory status with pulmonary support.  Patient was transitioned to nasal cannula and titrated to room air.  He did require oxygen at time of discharge during ambulation.  Patient  has a pulse oximeter at home and will continue to monitor oxygen levels.  Home oxygen was prescribed.  Patient does have a prescription for home oxygen 1 to 3 L but does not use for some time.  He did not bring oxygen with him from [facility].  Home inhalers were resumed.  Patient prescribed a nebulizer with DuoNebs 4 times daily as needed for worsening shortness of breath.  In addition, he was prescribed oral prednisone 40 mg daily to complete a 5-day course of steroids.  Patient's home Lasix 80 mg in the morning and 40 mg p.m. was resumed.  Patient was instructed to monitor daily weights and call primary care provider if weight is increasing.  Discussed discharge plan with patient and family.    Fall with left-sided facial trauma and back pain: Patient fell over in bed prior to hospitalization hitting his forehead on the dresser and left arm.  Hematoma left side of his face.  Noncontrast head CT negative for acute pathology or hemorrhage.  CT chest with IV contrast negative for rib fractures patient had improvement in back pain with lidocaine patches.  Lidocaine ointment provided on discharge.  Patient started to cover with Band-Aid for longer lasting effects.  Home physical therapy prescribed at discharge.    Subtherapeutic INR; Atrial fibrillation s/p pacemaker: Pharmacy consulted to dose.  DVT prophylaxis with Lovenox till INR therapeutic.  Patient INR 1.7 at time of discharge.  Patient prescribed warfarin 5 mg daily until INR check 5/31/2021.    Nataliia Hand,     Significant Results and Procedures  See below     Pending Results : None    Code Status  Full Code       Primary Care Physician   [facility]    Physical Exam   Temp: 98.8  F (37.1  C) Temp src: Oral BP: 111/64 Pulse: 60   Resp: 18 SpO2: 95 % O2 Device: None (Room air) Oxygen Delivery: 2 LPM(Ambulating in hallway)  Vitals:    05/26/21 1012   Weight: 91.2 kg (201 lb 1 oz)     Vital Signs with Ranges  Temp:  [97.5  F (36.4  C)-98.8   F (37.1  C)] 98.8  F (37.1  C)  Pulse:  [60-63] 60  Resp:  [18-20] 18  BP: (111-129)/(51-67) 111/64  SpO2:  [88 %-97 %] 95 %  I/O last 3 completed shifts:  In: 500 [P.O.:500]  Out: 1300 [Urine:1300]    Constitutional: Awake, alert, cooperative, no apparent distress. Non-toxic. Appears stated age.   HEENT: Atraumatic. Normocephalic. Conjunctiva non-injected. Sclera anicteric. MMM.   Respiratory: Moves air bilaterally. No use of accessory respiratory muscles. Clear to auscultation. No wheezing or rales.   Cardiovascular: V-paced. Regular rate. Normal S1 and S2, and no murmur noted  GI: Round, firm, non-tender, distended. Normoactive BS+. No rebound tenderness or guarding.   Skin/Integumen: No rashes, no cyanosis, trace edema. Ecchymosis on lleft aspect of face (fall).     Discharge Disposition   Discharged to home  Condition at discharge: Stable    Consultations This Hospital Stay   PHARMACY TO DOSE WARFARIN  PHYSICAL THERAPY ADULT IP CONSULT  CARE MANAGEMENT / SOCIAL WORK IP CONSULT    Time Spent on this Encounter   INataliia DO, personally saw the patient today and spent greater than 30 minutes discharging this patient.    Discharge Orders      INR     Home Care PT Referral for Hospital Discharge      Reason for your hospital stay    Acute COPD exacerbation     Activity    Your activity upon discharge: activity as tolerated. You will need to use oxygen with activity.     Monitor and record    Monitor our oxygen levels at home. Your goal is to keep your oxygen >89%.  Monitor your weight daily. Take your weight first thing in the morning after using the bathroom and prior to eating or drinking. Record your weight and if increasing by more than 2 lbs notify your doctor.     When to contact your care team    If you develop worsening shortness of breathing, cough with  Sputum production, fevers (temperature >100.4) or have concerns notify your doctor or return to the hospital.     Incentive Spirometry     Continue to use incentive spirometer 4 times a day Until return to normal activity     MD face to face encounter    Documentation of Face to Face and Certification for Home Health Services    I certify that patient: Thierry Samuel is under my care and that I, or a nurse practitioner or physician's assistant working with me, had a face-to-face encounter that meets the physician face-to-face encounter requirements with this patient on: 5/25/2021.    This encounter with the patient was in whole, or in part, for the following medical condition, which is the primary reason for home health care: Acute  COPD exacerbation with weakness and fall    I certify that, based on my findings, the following services are medically necessary home health services: Physical Therapy.    My clinical findings support the need for the above services because: Physical Therapy Services are needed to assess and treat the following functional impairments: see  note.    Further, I certify that my clinical findings support that this patient is homebound (i.e. absences from home require considerable and taxing effort and are for medical reasons or Shinto services or infrequently or of short duration when for other reasons) because: Leaving home is medically contraindicated for the following reason(s): limited mobility/falls..    Based on the above findings. I certify that this patient is confined to the home and needs intermittent skilled nursing care, physical therapy and/or speech therapy.  The patient is under my care, and I have initiated the establishment of the plan of care.  This patient will be followed by a physician who will periodically review the plan of care.  Physician/Provider to provide follow up care: [facility] hospital physician (the Medicare certified RASHAWN provider): Nataliia Hand DO  Physician Signature: See electronic signature associated with these discharge orders.  Date:  5/28/2021     Follow-up and recommended labs and tests     Follow up with primary care provider, [facility], within 3-5 days for hospital follow- up. INR check     Discharge Instructions    If your cough continues to be bothersome and worse than baseline you can purchase a cough suppressant at your local pharmacy. Nebulizer medications and machine were prescribed to be used as needed when you feel short of breathing. You should continue steroids to complete a total of 5 days. Monitor your volume status. Continue to take your diuretics as prescribed. If you have worsening edema in your legs, your weight is increasing, or you become short of breath notify your doctor. Home PT was ordered to continue working with you to gain back your strength. You were also prescribed lidocaine to help with your back pain. You can apply a Band-Aid over the lidocaine for longer lasting effects.    Your INR was <2 while you were in the hospital. Take your home supply warfarin 5 mg daily until you have your INR checked on 5/31/21. Your doctor will guide you on your dosing of warfarin following your INR check     Full Code     Oxygen Adult/Peds    Oxygen Documentation:   I certify that this patient, Thierry Sameul has been under my care (or a nurse practitioner or physican's assistant working with me). This is the face-to-face encounter for oxygen medical necessity.      Thierry Samuel is now in a chronic stable state and continues to require supplemental oxygen. Patient has continued oxygen desaturation due to COPD J44.9.    Alternative treatment(s) tried or considered and deemed clinically infective for treatment of COPD J44.9 include nebulizers, inhalers and steroids.  If portability is ordered, is the patient mobile within the home? yes    **Patients who qualify for home O2 coverage under the CMS guidelines require ABG tests or O2 sat readings obtained closest to, but no earlier than 2 days prior  to the discharge, as evidence of the need for home oxygen therapy. Testing must be performed while patient is in the chronic stable state. See notes for O2 sats.**     Nebulizer and Nebulizer Supplies    Nebulizer/Supplies Documentation:   The patient was seen 05/28/2021. After assessment, the patient will need to be treated with ongoing nebulizer for treatment/management of COPD    I, the undersigned, certify that the above prescribed supplies are medically necessary for this patient and is both reasonable and necessary in reference to accepted standards of medical and necessary in reference to accepted standards of medical practice in the treatment of this patient's condition and is not prescribed as a convenience.     Diet    Follow this diet upon discharge: Orders Placed This Encounter      Regular Diet Adult     Discharge Medications   Discharge Medication List as of 5/28/2021  1:32 PM      START taking these medications    Details   ipratropium - albuterol 0.5 mg/2.5 mg/3 mL (DUONEB) 0.5-2.5 (3) MG/3ML neb solution Take 1 vial (3 mLs) by nebulization 4 times daily as needed for shortness of breath / dyspnea or wheezing, Disp-90 mL, R-0, Local Print      lidocaine (LMX4) 4 % external cream Apply topically once as needed for mild painDisp-30 g, R-0Local Print      predniSONE (DELTASONE) 20 MG tablet Take 2 tablets (40 mg) by mouth daily for 4 days, Disp-8 tablet, R-0, Local Print         CONTINUE these medications which have CHANGED    Details   warfarin ANTICOAGULANT (COUMADIN) 2.5 MG tablet Take 1-2 tablets (2.5-5 mg) by mouth daily Takes 2.5 mg four times per week, 5mg three times per week., HistoricalTake warfarin 5 mg daily until 5/31/21.You should check your  INR on 5/31/21 and your doctor will guide your warfarin dosing following this  lab.         CONTINUE these medications which have NOT CHANGED    Details   ALBUTEROL IN Inhale 6 puffs into the lungs every 8 hours 6 puffs q8h scheduled  (Butovent/Salbutamol), Historical      Atorvastatin Calcium (LIPITOR PO) Take 20 mg by mouth every morning , Historical      !! Furosemide (LASIX PO) Take 40 mg by mouth every evening, Historical      !! furosemide (LASIX) 80 MG tablet Take 80 mg by mouth every morning, Historical      hypromellose-dextran (ARTIFICAL TEARS) 0.1-0.3 % SOLN ophthalmic solution Apply 2-3 drops to eye every hour as needed for dry eyes, Disp-30 mL, R-0, Local Print      losartan (COZAAR) 25 MG tablet Take 25 mg by mouth daily, Historical      omeprazole (PRILOSEC) 20 MG DR capsule Take 20 mg by mouth 2 times daily, Historical      POTASSIUM CHLORIDE PO Take 500 mg by mouth daily (medication name is cloruro de potasico 500 mg), Historical      rifaximin (XIFAXAN) 200 MG tablet Take 400 mg by mouth 2 times daily Rifaxol (Rifaximina) Uses 200mg tabs, 2 tabs bid (4 tabs/day) on first seven days of each month for diverticulosis., Historical      tiotropium (SPIRIVA) 18 MCG inhalation capsule Inhale 18 mcg into the lungs daily, 18 mcg, Inhalation, DAILY, Until Discontinued, Historical      UNABLE TO FIND Inhale 2 puffs into the lungs 2 times daily MEDICATION NAME: Serflu' (salmeterol xinafoate 25 mcg fluticasone propionate 250 mcg)  Inhaler 2 puffs bid., Historical       !! - Potential duplicate medications found. Please discuss with provider.        Allergies   No Known Allergies  Data   Most Recent 3 CBC's:  Recent Labs   Lab Test 05/27/21  0612 05/26/21  0047 09/30/18  0644   WBC 9.2 6.2 7.2   HGB 13.3 13.6 12.4*   MCV 98 101* 94    250 234      Most Recent 3 BMP's:  Recent Labs   Lab Test 05/28/21  0535 05/27/21  0612 05/26/21  1118 05/26/21  0047    136  --  138   POTASSIUM 3.8 3.9  --  4.0   CHLORIDE 104 105  --  105   CO2 30 29  --  30   BUN 26 22  --  21   CR 0.78 0.84 0.72 1.02   ANIONGAP 4 2*  --  3   CAMPOS 8.3* 8.3*  --  8.4*   GLC 99 126*  --  119*     Most Recent 2 LFT's:  Recent Labs   Lab Test 05/26/21  0047  08/09/18  1758   AST 16 13   ALT 24 20   ALKPHOS 110 114   BILITOTAL 0.4 0.1*     Most Recent INR's and Anticoagulation Dosing History:  Anticoagulation Dose History     Recent Dosing and Labs Latest Ref Rng & Units 9/27/2018 9/28/2018 9/29/2018 9/30/2018 5/26/2021 5/27/2021 5/28/2021    ZZ IMS TEMPLATE - - - - - - 6 mg -    Warfarin 2.5 mg - 2.5 mg - - - - - -    Warfarin 3 mg - - - 6 mg - - - -    Warfarin 5 mg - - 5 mg - - 5 mg - -    INR 0.86 - 1.14 2.07(H) 1.71(H) 1.72(H) 2.00(H) 1.45(H) 1.43(H) 1.70(H)    INR 0.86 - 1.14 - - - - - - -        Most Recent 3 Troponin's:  Recent Labs   Lab Test 05/26/21  0047 09/27/18  1106 08/15/13  1914   TROPI <0.015 <0.015  --    TROPONIN  --   --  0.01     Most Recent Cholesterol Panel:No lab results found.  Most Recent 6 Bacteria Isolates From Any Culture (See EPIC Reports for Culture Details):  Recent Labs   Lab Test 09/29/18  1325   CULT Heavy growth  Normal [place]       Most Recent TSH, T4 and A1c Labs:  Recent Labs   Lab Test 09/28/18  0619   TSH 1.21     Results for orders placed or performed during the hospital encounter of 05/26/21   XR Chest Port 1 View    Narrative    EXAM: XR CHEST PORT 1 VIEW  LOCATION: [facility]  DATE/TIME: 5/26/2021 1:36 AM    INDICATION: Shortness of breath  COMPARISON: 09/27/2018      Impression    IMPRESSION: Cardiomegaly. Pulmonary vascularity normal. Slight interstitial prominence may represent mild edema. Subsegmental atelectasis in the bases. No airspace infiltrates or pleural effusions. Right-sided pacemaker lead tip in the right ventricle.   CT Head w/o Contrast    Narrative    EXAM: CT HEAD W/O CONTRAST  LOCATION: [facility]  DATE/TIME: 5/26/2021 2:53 AM    INDICATION: Headache, intracranial hemorrhage suspected  COMPARISON: None.  TECHNIQUE: Routine CT Head without IV contrast. Multiplanar reformats. Dose reduction techniques were used.    FINDINGS:  INTRACRANIAL CONTENTS: No intracranial hemorrhage,  extraaxial collection, or mass effect.  No CT evidence of acute infarct. Mild presumed chronic small vessel ischemic changes. Moderate generalized volume loss. No hydrocephalus.     VISUALIZED ORBITS/SINUSES/MASTOIDS: No intraorbital abnormality. No paranasal sinus mucosal disease. No middle ear or mastoid effusion.    BONES/SOFT TISSUES: No acute abnormality. Vascular calcifications.      Impression    IMPRESSION:  1.  No acute intracranial process.  2.  Age related changes described above.   CT Chest Pulmonary Embolism w Contrast    Narrative    EXAM: CT CHEST PULMONARY EMBOLISM W CONTRAST  LOCATION: [facility]  DATE/TIME: 5/26/2021 2:42 AM    INDICATION: Difficulty breathing and thoracic pain. Recent fall. Concern for pulmonary embolus.  COMPARISON: Chest radiograph today.  TECHNIQUE: CT chest pulmonary angiogram during arterial phase injection of IV contrast. Multiplanar reformats and MIP reconstructions were performed. Dose reduction techniques were used.   CONTRAST: 93mL Isovue-370    FINDINGS:  ANGIOGRAM CHEST: No pulmonary embolus detected. Tortuous mildly dilated thoracic aorta measures up to 4.0 cm in the ascending portion. Reflux of contrast into the IVC consistent with right heart dysfunction.    LUNGS AND PLEURA: Moderate centrilobular emphysema with upper lobe predominance. Mild atelectasis of both lower lungs. No pleural fluid or pneumothorax.    MEDIASTINUM/AXILLAE: Enlarged heart. Right subclavian pacemaker with right ventricular lead.    CORONARY ARTERY CALCIFICATION: Moderate.    UPPER ABDOMEN: Normal.    MUSCULOSKELETAL: Demineralized skeleton. No acute fracture detected. Degenerative changes of the spine.      Impression    IMPRESSION:  1.  No pulmonary embolus.  2.  Tortuous mildly dilated thoracic aorta measures up to 4.0 cm in the ascending portion.  3.  Cardiomegaly and evidence for right heart dysfunction.  4.  Emphysema.

## 2021-05-28 NOTE — CONSULTS
Care Management Initial Consult    General Information  Assessment completed with: Family, [place]- daughter  Pt present but does not speak English   Type of CM/SW Visit: Initial Assessment    Primary Care Provider verified and updated as needed:     Readmission within the last 30 days:        Reason for Consult: discharge planning  Advance Care Planning:            Communication Assessment  Patient's communication style: spoken language (non-English)    Hearing Difficulty or Deaf: no   Wear Glasses or Blind: no    Cognitive  Cognitive/Neuro/Behavioral: WDL                      Living Environment:   People in home:       Current living Arrangements: mobile home      Able to return to prior arrangements: yes  Living Arrangement Comments: staying with family     Family/Social Support:  Care provided by: self  Provides care for: no one  Marital Status:   Children          Description of Support System: Supportive, Involved    Support Assessment: Adequate family and caregiver support, Adequate social supports    Current Resources:   Patient receiving home care services: No     Community Resources:    Equipment currently used at home: walker, standard(nebuylizer)  Supplies currently used at home:      Employment/Financial:  Employment Status: retired        Financial Concerns: insurance, none   Referral to Financial Counselor: No       Lifestyle & Psychosocial Needs:        Socioeconomic History     Marital status:      Spouse name: Not on file     Number of children: Not on file     Years of education: Not on file     Highest education level: Not on file     Tobacco Use     Smoking status: Former Smoker     Smokeless tobacco: Never Used   Substance and Sexual Activity     Alcohol use: Yes     Comment: social     Drug use: No       Functional Status:  Prior to admission patient needed assistance:       Pt has been independent in his ADL's        Mental Health Status:  Mental Health Status: No Current  Concerns       Chemical Dependency Status:  Chemical Dependency Status: No Current Concerns             Values/Beliefs:  Spiritual, Cultural Beliefs, Jew Practices, Values that affect care: no               Additional Information:  Met with pt and his daughter. Pt flew here to be present for is granddaughters 17th birthday.. Pt has H.o COPD and has home o2 at home that he has worn a PRN.. Pt's daughter stated that at her home they have a home walker and a nebulizer, all they need is the medication for pt's supprt  MD did place order for home PT.. Pt does not have health ins and CASIMIRO spoke about support being OP to them.. Pt was to see a MD at the [facility].. Pt has not been seen there since his last visit.  CASIMIRO explained that pt will need to be seen then their provider can order a home PT eval at that time    Family are aware. Spoke with family about OP cost for home o2, they are willing to place on Credit card. CASIMIRO spoke with Tammy from [facility].. She will call daughter once payment has been obtained will have [place] O2 delivered to pt    No other needs at this time.     Corinne C. White, FAWAD

## 2021-05-28 NOTE — PHARMACY-ANTICOAGULATION SERVICE
Clinical Pharmacy- Warfarin Discharge Note  This patient is currently on warfarin for the treatment of Atrial fibrillation.  INR Goal= 2-3  Expected length of therapy lifetime.    Warfarin PTA Regimen: 2.5 mg four times per week, 5mg three times per week.      Anticoagulation Dose History     Recent Dosing and Labs Latest Ref Rng & Units 9/27/2018 9/28/2018 9/29/2018 9/30/2018 5/26/2021 5/27/2021 5/28/2021    BECKIE IMS TEMPLATE - - - - - - 6 mg -    Warfarin 2.5 mg - 2.5 mg - - - - - -    Warfarin 3 mg - - - 6 mg - - - -    Warfarin 5 mg - - 5 mg - - 5 mg - -    INR 0.86 - 1.14 2.07(H) 1.71(H) 1.72(H) 2.00(H) 1.45(H) 1.43(H) 1.70(H)    INR 0.86 - 1.14 - - - - - - -          Vitamin K doses administered during the last 7 days: ZERO  FFP administered during the last 7 days: ZERO      Recommend Warfarin 5mg daily until INR recheck (3-5 days at [facility]).      Pietro Lake, Pharm.D., BCPS

## 2021-05-28 NOTE — PLAN OF CARE
ICU End of Shift Summary.  For vital signs and complete assessments, please see documentation flowsheets.     Pertinent assessments: Alert and oriented. Denies pain. Cpap worn during night. 2L worn during activity. Walked x 1 in the [place]. Voiding well. BM x 1 tonight. Daughter in room throughout night assisting with cares and translating.  Major Shift Events:   Plan (Upcoming Events): Transfer to medical floor when bed available.   Discharge/Transfer Needs:     Bedside Shift Report Completed   Bedside Safety Check Completed

## 2021-05-28 NOTE — PROGRESS NOTES
AVS and scripts with pt. Discharge instructions explained to pt and daughter. NA escorted him to ride.

## 2021-05-28 NOTE — PROGRESS NOTES
05/28/21 0800   Quick Adds   Type of Visit Initial PT Evaluation   Living Environment   People in home child(fareed), adult   Current Living Arrangements mobile home   Home Accessibility stairs to enter home   Number of Stairs, Main Entrance 3   Stair Railings, Main Entrance railing on right side (ascending)   Transportation Anticipated family or friend will provide   Living Environment Comments Pt is from [facility] and has been visiting his daughter for the past 3 months.  Pt plans to return to his dtr's mobille home with 3 JENNIFER and R rail.  2 grandchildren also present in the home.   Self-Care   Usual Activity Tolerance good   Current Activity Tolerance moderate   Regular Exercise Yes   Activity/Exercise Type other (see comments)   Exercise Amount/Frequency   (LE ergometer)   Equipment Currently Used at Home cane, straight;commode chair;grab bar, tub/shower;shower chair;walker, rolling   Activity/Exercise/Self-Care Comment Pt ambulates with a SEC in the home and 4WW in community.  Pt was I with ADLs except for his daughter providing SBA with bathing.  Pt's dtr is planning to purchase a bedrail   Disability/Function   Hearing Difficulty or Deaf no   Difficulty Communicating no   Walking or Climbing Stairs Difficulty yes   Walking or Climbing Stairs ambulation difficulty, requires equipment;stair climbing difficulty, requires equipment   Dressing/Bathing Difficulty yes   Dressing/Bathing bathing difficulty, requires equipment;bathing difficulty, assistance 1 person   Toileting issues no   Doing Errands Independently Difficulty (such as shopping) no   Fall history within last six months yes   Number of times patient has fallen within last six months 1   Change in Functional Status Since Onset of Current Illness/Injury yes   General Information   Onset of Illness/Injury or Date of Surgery 05/26/21   Referring Physician Nataliia Hand   Patient/Family Therapy Goals Statement (PT) To return home with daughter   Pertinent  History of Current Problem (include personal factors and/or comorbidities that impact the POC) Thierry Samuel is a 86 year old Sami-speaking male who lives in [facility], but visits his daughter for 3 months every year with PMH including severe COPD on 3 L via nasal cannula as needed, SUSY using CPAP, severe pulmonary HTN, diastolic CHF, A. fib on warfarin s/pacemaker placement,p HTN, HLD, history of GI bleed, and history chronic left leg wound who presents with his daughter for evaluation of back pain and shortness of breath.  Head and chest CTs negative for fxs and hemorrhage.     Existing Precautions/Restrictions fall   Heart Disease Risk Factors High blood pressure;Dislipidemia;Medical history;Gender;Age   General Observations Pt sitting up in recliner on RA with daughter present.  SpO2 95%, HR 60 bpm   Cognition   Orientation Status (Cognition) oriented x 3   Affect/Mental Status (Cognition) WNL   Follows Commands (Cognition) WNL   Cognitive Status Comments Pt able to answer all questions with daughter interpreting   Pain Assessment   Patient Currently in Pain   (pt did not state)   Posture    Posture Forward head position;Protracted shoulders   Range of Motion (ROM)   ROM Comment B LEs WFL as observed with mobility   Strength   Strength Comments Decreased functional LE strength observed with effort during mobility   Bed Mobility   Bed Mobility supine-sit;sit-supine   Supine-Sit [place] (Bed Mobility) verbal cues;minimum assist (75% patient effort)   Sit-Supine [place] (Bed Mobility) supervision   Comment (Bed Mobility) Sit <> supine with bed rail and Min A to come to sitting with VCs to use bedrail as pt will have one at home.   Transfers   Transfers sit-stand transfer   Sit-Stand Transfer   Sit-Stand [place] (Transfers) supervision;verbal cues   Assistive Device (Sit-Stand Transfers) walker, front-wheeled   Sit/Stand Transfer Comments Sit <> stand to FWW from bed and recliner with SBA  with increased effort to come to stance.   Gait/Stairs (Locomotion)   [place] Level (Gait) supervision   Assistive Device (Gait) walker, front-wheeled   Distance in Feet (Required for LE Total Joints) 160   Comment (Gait/Stairs) Pt ambulated with FWW and SBA with slow steady gait.  Pt initially on room air with SpO2 95%.  Pt amb initial 50' and then up/down 3 steps with R rail and CGA.  Pt desaturated to 78%. Pt placed on 4L O2 and cued for pursed lip breathing.  Pt recovered to low 90s.  Pt continued ambulation but then desaturated to 72%.  O2 increased to 6L.  Pt assymptomatic.     Balance   Balance Comments Pt requires FWW for safe standing balance during gait.   Coordination   Coordination no deficits were identified   Muscle Tone   Muscle Tone no deficits were identified   Clinical Impression   Criteria for Skilled Therapeutic Intervention yes, treatment indicated   PT Diagnosis (PT) Decreased functional mobility   Influenced by the following impairments Deconditioning, Decreased activity tolerance, decreased LE strength and balance, impaired gait   Functional limitations due to impairments Pt desaturates with mobility requiring supplemental O2, unable to ambulate longer community distances, assisted ADLs   Clinical Presentation Stable/Uncomplicated   Clinical Presentation Rationale strong family support, comorbidities   Clinical Decision Making (Complexity) low complexity   Therapy Frequency (PT) Daily   Predicted Duration of Therapy Intervention (days/wks) 3 days   Planned Therapy Interventions (PT) balance training;bed mobility training;gait training;home exercise program;patient/family education;ROM (range of motion);stair training;strengthening;transfer training   Risk & Benefits of therapy have been explained evaluation/treatment results reviewed;care plan/treatment goals reviewed;risks/benefits reviewed;current/potential barriers reviewed;participants voiced agreement with care plan;participants  included;patient;daughter   PT Discharge Planning    PT Discharge Recommendation (DC Rec) home with assist;home with home care physical therapy   PT Rationale for DC Rec Pt below baseline with all mobility with need for supplemental O2 with mobility.  Pt would beneift from continued skilled PT in the acute and home environment to increase activity tolerance, LE strength and balance and progress independence with all functional mobility to enable pt to return home with increased safety and decreased falls risk   PT Brief overview of current status  A x 1 FWW, 4-6L O2, please ambulate 3-4x/day   Total Evaluation Time   Total Evaluation Time (Minutes) 10

## 2021-05-29 NOTE — PLAN OF CARE
Physical Therapy Discharge Summary    Reason for therapy discharge:    Discharged to home with recs for home therapy.    Progress towards therapy goal(s). See goals on Care Plan in [facility] electronic health record for goal details.  Goals met    Therapy recommendation(s):    Continued therapy is recommended.  Rationale/Recommendations: Pt is below baseline for functional mobility, ROM and strength. Pt would benefit from continued skilled therapy to address these deficits.

## 2021-08-10 ENCOUNTER — HOSPITAL ENCOUNTER (INPATIENT)
Facility: CLINIC | Age: 86
LOS: 2 days | Discharge: HOME | End: 2021-08-12
Attending: EMERGENCY MEDICINE | Admitting: HOSPITALIST
Payer: MEDICAID

## 2021-08-10 ENCOUNTER — APPOINTMENT (OUTPATIENT)
Dept: GENERAL RADIOLOGY | Facility: CLINIC | Age: 86
End: 2021-08-10
Attending: EMERGENCY MEDICINE
Payer: MEDICAID

## 2021-08-10 DIAGNOSIS — J96.21 ACUTE ON CHRONIC RESPIRATORY FAILURE WITH HYPOXIA (H): ICD-10-CM

## 2021-08-10 DIAGNOSIS — I50.9 ACUTE ON CHRONIC CONGESTIVE HEART FAILURE, UNSPECIFIED HEART FAILURE TYPE (H): ICD-10-CM

## 2021-08-10 DIAGNOSIS — J44.1 COPD EXACERBATION (H): ICD-10-CM

## 2021-08-10 LAB
ALBUMIN SERPL-MCNC: 3.4 G/DL (ref 3.4–5)
ALP SERPL-CCNC: 91 U/L (ref 40–150)
ALT SERPL W P-5'-P-CCNC: 18 U/L (ref 0–70)
ANION GAP SERPL CALCULATED.3IONS-SCNC: 4 MMOL/L (ref 3–14)
AST SERPL W P-5'-P-CCNC: 14 U/L (ref 0–45)
BASE EXCESS BLDV CALC-SCNC: 3.6 MMOL/L (ref -7.7–1.9)
BASOPHILS # BLD AUTO: 0 10E3/UL (ref 0–0.2)
BASOPHILS NFR BLD AUTO: 0 %
BILIRUB SERPL-MCNC: 1.1 MG/DL (ref 0.2–1.3)
BUN SERPL-MCNC: 13 MG/DL (ref 7–30)
CALCIUM SERPL-MCNC: 8.4 MG/DL (ref 8.5–10.1)
CHLORIDE BLD-SCNC: 104 MMOL/L (ref 94–109)
CO2 SERPL-SCNC: 29 MMOL/L (ref 20–32)
CREAT SERPL-MCNC: 0.85 MG/DL (ref 0.66–1.25)
EOSINOPHIL # BLD AUTO: 0.4 10E3/UL (ref 0–0.7)
EOSINOPHIL NFR BLD AUTO: 3 %
ERYTHROCYTE [DISTWIDTH] IN BLOOD BY AUTOMATED COUNT: 13.1 % (ref 10–15)
GFR SERPL CREATININE-BSD FRML MDRD: 79 ML/MIN/1.73M2
GLUCOSE BLD-MCNC: 134 MG/DL (ref 70–99)
HCO3 BLDV-SCNC: 30 MMOL/L (ref 21–28)
HCT VFR BLD AUTO: 41.5 % (ref 40–53)
HGB BLD-MCNC: 13.1 G/DL (ref 13.3–17.7)
IMM GRANULOCYTES # BLD: 0.1 10E3/UL
IMM GRANULOCYTES NFR BLD: 0 %
INR PPP: 2.91 (ref 0.85–1.15)
LACTATE SERPL-SCNC: 1.4 MMOL/L (ref 0.7–2)
LYMPHOCYTES # BLD AUTO: 0.6 10E3/UL (ref 0.8–5.3)
LYMPHOCYTES NFR BLD AUTO: 5 %
MCH RBC QN AUTO: 30.8 PG (ref 26.5–33)
MCHC RBC AUTO-ENTMCNC: 31.6 G/DL (ref 31.5–36.5)
MCV RBC AUTO: 98 FL (ref 78–100)
MONOCYTES # BLD AUTO: 1.8 10E3/UL (ref 0–1.3)
MONOCYTES NFR BLD AUTO: 13 %
NEUTROPHILS # BLD AUTO: 10.7 10E3/UL (ref 1.6–8.3)
NEUTROPHILS NFR BLD AUTO: 79 %
NRBC # BLD AUTO: 0 10E3/UL
NRBC BLD AUTO-RTO: 0 /100
NT-PROBNP SERPL-MCNC: 1063 PG/ML (ref 0–1800)
O2/TOTAL GAS SETTING VFR VENT: 2 %
OXYHGB MFR BLDV: 38 % (ref 70–75)
PCO2 BLDV: 51 MM HG (ref 40–50)
PH BLDV: 7.38 [PH] (ref 7.32–7.43)
PLATELET # BLD AUTO: 185 10E3/UL (ref 150–450)
PO2 BLDV: 23 MM HG (ref 25–47)
POTASSIUM BLD-SCNC: 4.1 MMOL/L (ref 3.4–5.3)
POTASSIUM BLD-SCNC: 4.3 MMOL/L (ref 3.4–5.3)
PROT SERPL-MCNC: 6.6 G/DL (ref 6.8–8.8)
RBC # BLD AUTO: 4.25 10E6/UL (ref 4.4–5.9)
SARS-COV-2 RNA RESP QL NAA+PROBE: NEGATIVE
SODIUM SERPL-SCNC: 137 MMOL/L (ref 133–144)
TROPONIN I SERPL-MCNC: <0.015 UG/L (ref 0–0.04)
WBC # BLD AUTO: 13.5 10E3/UL (ref 4–11)

## 2021-08-10 PROCEDURE — C9803 HOPD COVID-19 SPEC COLLECT: HCPCS

## 2021-08-10 PROCEDURE — 36415 COLL VENOUS BLD VENIPUNCTURE: CPT | Performed by: EMERGENCY MEDICINE

## 2021-08-10 PROCEDURE — 84132 ASSAY OF SERUM POTASSIUM: CPT | Performed by: HOSPITALIST

## 2021-08-10 PROCEDURE — 85610 PROTHROMBIN TIME: CPT | Performed by: EMERGENCY MEDICINE

## 2021-08-10 PROCEDURE — 36415 COLL VENOUS BLD VENIPUNCTURE: CPT | Performed by: HOSPITALIST

## 2021-08-10 PROCEDURE — 82805 BLOOD GASES W/O2 SATURATION: CPT | Performed by: EMERGENCY MEDICINE

## 2021-08-10 PROCEDURE — 94640 AIRWAY INHALATION TREATMENT: CPT | Mod: 76

## 2021-08-10 PROCEDURE — 999N000157 HC STATISTIC RCP TIME EA 10 MIN

## 2021-08-10 PROCEDURE — 87635 SARS-COV-2 COVID-19 AMP PRB: CPT | Performed by: EMERGENCY MEDICINE

## 2021-08-10 PROCEDURE — 96365 THER/PROPH/DIAG IV INF INIT: CPT

## 2021-08-10 PROCEDURE — 94640 AIRWAY INHALATION TREATMENT: CPT

## 2021-08-10 PROCEDURE — 84484 ASSAY OF TROPONIN QUANT: CPT | Performed by: EMERGENCY MEDICINE

## 2021-08-10 PROCEDURE — 99285 EMERGENCY DEPT VISIT HI MDM: CPT | Mod: 25

## 2021-08-10 PROCEDURE — 93005 ELECTROCARDIOGRAM TRACING: CPT

## 2021-08-10 PROCEDURE — 96375 TX/PRO/DX INJ NEW DRUG ADDON: CPT

## 2021-08-10 PROCEDURE — 99223 1ST HOSP IP/OBS HIGH 75: CPT | Mod: AI | Performed by: HOSPITALIST

## 2021-08-10 PROCEDURE — 83880 ASSAY OF NATRIURETIC PEPTIDE: CPT | Performed by: EMERGENCY MEDICINE

## 2021-08-10 PROCEDURE — 250N000009 HC RX 250: Performed by: EMERGENCY MEDICINE

## 2021-08-10 PROCEDURE — 85025 COMPLETE CBC W/AUTO DIFF WBC: CPT | Performed by: EMERGENCY MEDICINE

## 2021-08-10 PROCEDURE — 80053 COMPREHEN METABOLIC PANEL: CPT | Performed by: EMERGENCY MEDICINE

## 2021-08-10 PROCEDURE — 71045 X-RAY EXAM CHEST 1 VIEW: CPT

## 2021-08-10 PROCEDURE — 120N000001 HC R&B MED SURG/OB

## 2021-08-10 PROCEDURE — 250N000011 HC RX IP 250 OP 636: Performed by: EMERGENCY MEDICINE

## 2021-08-10 PROCEDURE — 250N000009 HC RX 250: Performed by: HOSPITALIST

## 2021-08-10 PROCEDURE — 83605 ASSAY OF LACTIC ACID: CPT | Performed by: EMERGENCY MEDICINE

## 2021-08-10 PROCEDURE — 250N000013 HC RX MED GY IP 250 OP 250 PS 637: Performed by: EMERGENCY MEDICINE

## 2021-08-10 PROCEDURE — 250N000013 HC RX MED GY IP 250 OP 250 PS 637: Performed by: HOSPITALIST

## 2021-08-10 RX ORDER — CEFTRIAXONE 2 G/1
2 INJECTION, POWDER, FOR SOLUTION INTRAMUSCULAR; INTRAVENOUS ONCE
Status: COMPLETED | OUTPATIENT
Start: 2021-08-10 | End: 2021-08-10

## 2021-08-10 RX ORDER — ONDANSETRON 2 MG/ML
4 INJECTION INTRAMUSCULAR; INTRAVENOUS EVERY 6 HOURS PRN
Status: DISCONTINUED | OUTPATIENT
Start: 2021-08-10 | End: 2021-08-12 | Stop reason: HOSPADM

## 2021-08-10 RX ORDER — IPRATROPIUM BROMIDE AND ALBUTEROL SULFATE 2.5; .5 MG/3ML; MG/3ML
6 SOLUTION RESPIRATORY (INHALATION) ONCE
Status: COMPLETED | OUTPATIENT
Start: 2021-08-10 | End: 2021-08-10

## 2021-08-10 RX ORDER — LOSARTAN POTASSIUM 25 MG/1
25 TABLET ORAL DAILY
Status: DISCONTINUED | OUTPATIENT
Start: 2021-08-11 | End: 2021-08-12 | Stop reason: HOSPADM

## 2021-08-10 RX ORDER — POLYETHYLENE GLYCOL 3350 17 G/17G
17 POWDER, FOR SOLUTION ORAL DAILY PRN
Status: DISCONTINUED | OUTPATIENT
Start: 2021-08-10 | End: 2021-08-12 | Stop reason: HOSPADM

## 2021-08-10 RX ORDER — ACETAMINOPHEN 650 MG/1
650 SUPPOSITORY RECTAL EVERY 6 HOURS PRN
Status: DISCONTINUED | OUTPATIENT
Start: 2021-08-10 | End: 2021-08-12 | Stop reason: HOSPADM

## 2021-08-10 RX ORDER — ACETAMINOPHEN 325 MG/1
650 TABLET ORAL EVERY 6 HOURS PRN
Status: DISCONTINUED | OUTPATIENT
Start: 2021-08-10 | End: 2021-08-12 | Stop reason: HOSPADM

## 2021-08-10 RX ORDER — IPRATROPIUM BROMIDE AND ALBUTEROL SULFATE 2.5; .5 MG/3ML; MG/3ML
3 SOLUTION RESPIRATORY (INHALATION)
Status: DISCONTINUED | OUTPATIENT
Start: 2021-08-10 | End: 2021-08-12 | Stop reason: HOSPADM

## 2021-08-10 RX ORDER — METHYLPREDNISOLONE SODIUM SUCCINATE 125 MG/2ML
125 INJECTION, POWDER, LYOPHILIZED, FOR SOLUTION INTRAMUSCULAR; INTRAVENOUS ONCE
Status: COMPLETED | OUTPATIENT
Start: 2021-08-10 | End: 2021-08-10

## 2021-08-10 RX ORDER — FUROSEMIDE 10 MG/ML
40 INJECTION INTRAMUSCULAR; INTRAVENOUS EVERY 8 HOURS
Status: DISCONTINUED | OUTPATIENT
Start: 2021-08-11 | End: 2021-08-12 | Stop reason: HOSPADM

## 2021-08-10 RX ORDER — MONTELUKAST SODIUM 10 MG/1
10 TABLET ORAL EVERY EVENING
Status: DISCONTINUED | OUTPATIENT
Start: 2021-08-10 | End: 2021-08-12 | Stop reason: HOSPADM

## 2021-08-10 RX ORDER — DOXYCYCLINE 100 MG/1
100 CAPSULE ORAL ONCE
Status: COMPLETED | OUTPATIENT
Start: 2021-08-10 | End: 2021-08-10

## 2021-08-10 RX ORDER — DOXYCYCLINE 100 MG/1
100 CAPSULE ORAL EVERY 12 HOURS SCHEDULED
Status: DISCONTINUED | OUTPATIENT
Start: 2021-08-11 | End: 2021-08-12 | Stop reason: HOSPADM

## 2021-08-10 RX ORDER — AMOXICILLIN 250 MG
2 CAPSULE ORAL 2 TIMES DAILY PRN
Status: DISCONTINUED | OUTPATIENT
Start: 2021-08-10 | End: 2021-08-12 | Stop reason: HOSPADM

## 2021-08-10 RX ORDER — LIDOCAINE 40 MG/G
CREAM TOPICAL
Status: DISCONTINUED | OUTPATIENT
Start: 2021-08-10 | End: 2021-08-12 | Stop reason: HOSPADM

## 2021-08-10 RX ORDER — AMOXICILLIN 250 MG
1 CAPSULE ORAL 2 TIMES DAILY PRN
Status: DISCONTINUED | OUTPATIENT
Start: 2021-08-10 | End: 2021-08-12 | Stop reason: HOSPADM

## 2021-08-10 RX ORDER — FUROSEMIDE 10 MG/ML
60 INJECTION INTRAMUSCULAR; INTRAVENOUS ONCE
Status: COMPLETED | OUTPATIENT
Start: 2021-08-10 | End: 2021-08-10

## 2021-08-10 RX ORDER — BISACODYL 10 MG
10 SUPPOSITORY, RECTAL RECTAL DAILY PRN
Status: DISCONTINUED | OUTPATIENT
Start: 2021-08-10 | End: 2021-08-12 | Stop reason: HOSPADM

## 2021-08-10 RX ORDER — IPRATROPIUM BROMIDE AND ALBUTEROL SULFATE 2.5; .5 MG/3ML; MG/3ML
3 SOLUTION RESPIRATORY (INHALATION)
Status: DISCONTINUED | OUTPATIENT
Start: 2021-08-11 | End: 2021-08-10

## 2021-08-10 RX ORDER — IPRATROPIUM BROMIDE AND ALBUTEROL SULFATE 2.5; .5 MG/3ML; MG/3ML
1 SOLUTION RESPIRATORY (INHALATION) EVERY 6 HOURS PRN
COMMUNITY

## 2021-08-10 RX ORDER — ATORVASTATIN CALCIUM 20 MG/1
20 TABLET, FILM COATED ORAL EVERY MORNING
Status: DISCONTINUED | OUTPATIENT
Start: 2021-08-11 | End: 2021-08-12 | Stop reason: HOSPADM

## 2021-08-10 RX ORDER — LANOLIN ALCOHOL/MO/W.PET/CERES
3 CREAM (GRAM) TOPICAL
Status: DISCONTINUED | OUTPATIENT
Start: 2021-08-10 | End: 2021-08-12 | Stop reason: HOSPADM

## 2021-08-10 RX ORDER — MONTELUKAST SODIUM 10 MG/1
10 TABLET ORAL EVERY EVENING
COMMUNITY

## 2021-08-10 RX ORDER — WARFARIN SODIUM 2.5 MG/1
TABLET ORAL EVERY MORNING
Status: ON HOLD | COMMUNITY
End: 2021-09-06

## 2021-08-10 RX ORDER — ONDANSETRON 4 MG/1
4 TABLET, ORALLY DISINTEGRATING ORAL EVERY 6 HOURS PRN
Status: DISCONTINUED | OUTPATIENT
Start: 2021-08-10 | End: 2021-08-12 | Stop reason: HOSPADM

## 2021-08-10 RX ADMIN — METHYLPREDNISOLONE SODIUM SUCCINATE 125 MG: 125 INJECTION, POWDER, FOR SOLUTION INTRAMUSCULAR; INTRAVENOUS at 18:19

## 2021-08-10 RX ADMIN — IPRATROPIUM BROMIDE AND ALBUTEROL SULFATE 6 ML: .5; 3 SOLUTION RESPIRATORY (INHALATION) at 18:18

## 2021-08-10 RX ADMIN — MONTELUKAST 10 MG: 10 TABLET, FILM COATED ORAL at 21:46

## 2021-08-10 RX ADMIN — DOXYCYCLINE HYCLATE 100 MG: 100 CAPSULE ORAL at 19:15

## 2021-08-10 RX ADMIN — IPRATROPIUM BROMIDE AND ALBUTEROL SULFATE 3 ML: .5; 3 SOLUTION RESPIRATORY (INHALATION) at 22:06

## 2021-08-10 RX ADMIN — FUROSEMIDE 60 MG: 10 INJECTION, SOLUTION INTRAMUSCULAR; INTRAVENOUS at 18:19

## 2021-08-10 RX ADMIN — OMEPRAZOLE 20 MG: 20 CAPSULE, DELAYED RELEASE ORAL at 21:46

## 2021-08-10 RX ADMIN — CEFTRIAXONE 2 G: 2 INJECTION, POWDER, FOR SOLUTION INTRAMUSCULAR; INTRAVENOUS at 19:14

## 2021-08-10 ASSESSMENT — ACTIVITIES OF DAILY LIVING (ADL)
HEARING_DIFFICULTY_OR_DEAF: NO
DOING_ERRANDS_INDEPENDENTLY_DIFFICULTY: YES
WEAR_GLASSES_OR_BLIND: NO
NUMBER_OF_TIMES_PATIENT_HAS_FALLEN_WITHIN_LAST_SIX_MONTHS: 1
FALL_HISTORY_WITHIN_LAST_SIX_MONTHS: YES
DRESSING/BATHING_MANAGEMENT: BATHING REQUIRES ASSISTANCE
PATIENT_/_FAMILY_COMMUNICATION_STYLE: SPOKEN LANGUAGE (NON-ENGLISH)
INTERPRETER_SERVICES_OFFERED_TO_THE_PATIENT: YES
TOILETING_ISSUES: NO
DIFFICULTY_EATING/SWALLOWING: NO
DIFFICULTY_COMMUNICATING: NO
WALKING_OR_CLIMBING_STAIRS: STAIR CLIMBING DIFFICULTY, ASSISTANCE 1 PERSON
WALKING_OR_CLIMBING_STAIRS_DIFFICULTY: YES
DRESSING/BATHING: BATHING DIFFICULTY, ASSISTANCE 1 PERSON
CONCENTRATING,_REMEMBERING_OR_MAKING_DECISIONS_DIFFICULTY: NO
EQUIPMENT_CURRENTLY_USED_AT_HOME: CANE, STRAIGHT
DRESSING/BATHING_DIFFICULTY: YES

## 2021-08-10 ASSESSMENT — ENCOUNTER SYMPTOMS
SHORTNESS OF BREATH: 1
COUGH: 1

## 2021-08-10 ASSESSMENT — MIFFLIN-ST. JEOR: SCORE: 1318.58

## 2021-08-10 NOTE — ED TRIAGE NOTES
Pt wioth hx of COPD has home O2 PRN, over the last several days c/o increaesd shortness of breath and inability to clear his cough.  Today pt o2 sat was 68%, daughter place on home o2 with increased sat to 94%.

## 2021-08-11 LAB
ANION GAP SERPL CALCULATED.3IONS-SCNC: 6 MMOL/L (ref 3–14)
ATRIAL RATE - MUSE: 416 BPM
BUN SERPL-MCNC: 16 MG/DL (ref 7–30)
CALCIUM SERPL-MCNC: 8 MG/DL (ref 8.5–10.1)
CHLORIDE BLD-SCNC: 105 MMOL/L (ref 94–109)
CO2 SERPL-SCNC: 24 MMOL/L (ref 20–32)
CREAT SERPL-MCNC: 0.82 MG/DL (ref 0.66–1.25)
DIASTOLIC BLOOD PRESSURE - MUSE: NORMAL MMHG
ERYTHROCYTE [DISTWIDTH] IN BLOOD BY AUTOMATED COUNT: 13 % (ref 10–15)
GFR SERPL CREATININE-BSD FRML MDRD: 81 ML/MIN/1.73M2
GLUCOSE BLD-MCNC: 209 MG/DL (ref 70–99)
GLUCOSE BLDC GLUCOMTR-MCNC: 152 MG/DL (ref 70–99)
GLUCOSE BLDC GLUCOMTR-MCNC: 155 MG/DL (ref 70–99)
GLUCOSE BLDC GLUCOMTR-MCNC: 174 MG/DL (ref 70–99)
GLUCOSE BLDC GLUCOMTR-MCNC: 178 MG/DL (ref 70–99)
HBA1C MFR BLD: 5.6 % (ref 0–5.6)
HCT VFR BLD AUTO: 42.7 % (ref 40–53)
HGB BLD-MCNC: 13.2 G/DL (ref 13.3–17.7)
INR PPP: 3.23 (ref 0.85–1.15)
INTERPRETATION ECG - MUSE: NORMAL
MCH RBC QN AUTO: 31.3 PG (ref 26.5–33)
MCHC RBC AUTO-ENTMCNC: 30.9 G/DL (ref 31.5–36.5)
MCV RBC AUTO: 101 FL (ref 78–100)
P AXIS - MUSE: NORMAL DEGREES
PLATELET # BLD AUTO: 153 10E3/UL (ref 150–450)
POTASSIUM BLD-SCNC: 3.9 MMOL/L (ref 3.4–5.3)
PR INTERVAL - MUSE: NORMAL MS
QRS DURATION - MUSE: 130 MS
QT - MUSE: 410 MS
QTC - MUSE: 433 MS
R AXIS - MUSE: -55 DEGREES
RBC # BLD AUTO: 4.22 10E6/UL (ref 4.4–5.9)
SODIUM SERPL-SCNC: 135 MMOL/L (ref 133–144)
SYSTOLIC BLOOD PRESSURE - MUSE: NORMAL MMHG
T AXIS - MUSE: -15 DEGREES
VENTRICULAR RATE- MUSE: 67 BPM
WBC # BLD AUTO: 9.6 10E3/UL (ref 4–11)

## 2021-08-11 PROCEDURE — 999N000157 HC STATISTIC RCP TIME EA 10 MIN

## 2021-08-11 PROCEDURE — 83036 HEMOGLOBIN GLYCOSYLATED A1C: CPT | Performed by: HOSPITALIST

## 2021-08-11 PROCEDURE — 85027 COMPLETE CBC AUTOMATED: CPT | Performed by: HOSPITALIST

## 2021-08-11 PROCEDURE — 94640 AIRWAY INHALATION TREATMENT: CPT

## 2021-08-11 PROCEDURE — 250N000009 HC RX 250: Performed by: HOSPITALIST

## 2021-08-11 PROCEDURE — 36415 COLL VENOUS BLD VENIPUNCTURE: CPT | Performed by: HOSPITALIST

## 2021-08-11 PROCEDURE — G0463 HOSPITAL OUTPT CLINIC VISIT: HCPCS

## 2021-08-11 PROCEDURE — 94640 AIRWAY INHALATION TREATMENT: CPT | Mod: 76

## 2021-08-11 PROCEDURE — 82374 ASSAY BLOOD CARBON DIOXIDE: CPT | Performed by: HOSPITALIST

## 2021-08-11 PROCEDURE — 250N000013 HC RX MED GY IP 250 OP 250 PS 637: Performed by: HOSPITALIST

## 2021-08-11 PROCEDURE — 250N000011 HC RX IP 250 OP 636: Performed by: HOSPITALIST

## 2021-08-11 PROCEDURE — 85610 PROTHROMBIN TIME: CPT | Performed by: HOSPITALIST

## 2021-08-11 PROCEDURE — 99233 SBSQ HOSP IP/OBS HIGH 50: CPT | Performed by: HOSPITALIST

## 2021-08-11 PROCEDURE — 999N000156 HC STATISTIC RCP CONSULT EA 30 MIN

## 2021-08-11 PROCEDURE — 120N000001 HC R&B MED SURG/OB

## 2021-08-11 PROCEDURE — 250N000012 HC RX MED GY IP 250 OP 636 PS 637: Performed by: HOSPITALIST

## 2021-08-11 RX ORDER — DEXTROSE MONOHYDRATE 25 G/50ML
25-50 INJECTION, SOLUTION INTRAVENOUS
Status: DISCONTINUED | OUTPATIENT
Start: 2021-08-11 | End: 2021-08-12 | Stop reason: HOSPADM

## 2021-08-11 RX ORDER — NICOTINE POLACRILEX 4 MG
15-30 LOZENGE BUCCAL
Status: DISCONTINUED | OUTPATIENT
Start: 2021-08-11 | End: 2021-08-12 | Stop reason: HOSPADM

## 2021-08-11 RX ORDER — ALBUTEROL SULFATE 0.83 MG/ML
2.5 SOLUTION RESPIRATORY (INHALATION) EVERY 4 HOURS PRN
Status: DISCONTINUED | OUTPATIENT
Start: 2021-08-11 | End: 2021-08-12 | Stop reason: HOSPADM

## 2021-08-11 RX ADMIN — INSULIN ASPART 1 UNITS: 100 INJECTION, SOLUTION INTRAVENOUS; SUBCUTANEOUS at 19:57

## 2021-08-11 RX ADMIN — ACETAMINOPHEN 650 MG: 325 TABLET, FILM COATED ORAL at 14:56

## 2021-08-11 RX ADMIN — ACETAMINOPHEN 650 MG: 325 TABLET, FILM COATED ORAL at 19:50

## 2021-08-11 RX ADMIN — OMEPRAZOLE 20 MG: 20 CAPSULE, DELAYED RELEASE ORAL at 19:50

## 2021-08-11 RX ADMIN — MONTELUKAST 10 MG: 10 TABLET, FILM COATED ORAL at 19:50

## 2021-08-11 RX ADMIN — IPRATROPIUM BROMIDE AND ALBUTEROL SULFATE 3 ML: .5; 3 SOLUTION RESPIRATORY (INHALATION) at 11:15

## 2021-08-11 RX ADMIN — PREDNISONE 60 MG: 50 TABLET ORAL at 09:07

## 2021-08-11 RX ADMIN — FUROSEMIDE 40 MG: 10 INJECTION, SOLUTION INTRAMUSCULAR; INTRAVENOUS at 17:37

## 2021-08-11 RX ADMIN — IPRATROPIUM BROMIDE AND ALBUTEROL SULFATE 3 ML: .5; 3 SOLUTION RESPIRATORY (INHALATION) at 07:17

## 2021-08-11 RX ADMIN — UMECLIDINIUM 1 PUFF: 62.5 AEROSOL, POWDER ORAL at 07:17

## 2021-08-11 RX ADMIN — FUROSEMIDE 40 MG: 10 INJECTION, SOLUTION INTRAMUSCULAR; INTRAVENOUS at 03:31

## 2021-08-11 RX ADMIN — INSULIN ASPART 1 UNITS: 100 INJECTION, SOLUTION INTRAVENOUS; SUBCUTANEOUS at 14:41

## 2021-08-11 RX ADMIN — IPRATROPIUM BROMIDE AND ALBUTEROL SULFATE 3 ML: .5; 3 SOLUTION RESPIRATORY (INHALATION) at 15:11

## 2021-08-11 RX ADMIN — IPRATROPIUM BROMIDE AND ALBUTEROL SULFATE 3 ML: .5; 3 SOLUTION RESPIRATORY (INHALATION) at 19:22

## 2021-08-11 RX ADMIN — FUROSEMIDE 40 MG: 10 INJECTION, SOLUTION INTRAMUSCULAR; INTRAVENOUS at 09:11

## 2021-08-11 RX ADMIN — ATORVASTATIN CALCIUM 20 MG: 20 TABLET, FILM COATED ORAL at 09:07

## 2021-08-11 RX ADMIN — OMEPRAZOLE 20 MG: 20 CAPSULE, DELAYED RELEASE ORAL at 09:07

## 2021-08-11 RX ADMIN — DOXYCYCLINE HYCLATE 100 MG: 100 CAPSULE ORAL at 09:07

## 2021-08-11 RX ADMIN — DOXYCYCLINE HYCLATE 100 MG: 100 CAPSULE ORAL at 19:50

## 2021-08-11 ASSESSMENT — ENCOUNTER SYMPTOMS
FEVER: 0
CHILLS: 0

## 2021-08-11 ASSESSMENT — ACTIVITIES OF DAILY LIVING (ADL)
ADLS_ACUITY_SCORE: 18

## 2021-08-11 NOTE — PHARMACY-ADMISSION MEDICATION HISTORY
Admission medication history interview status for this patient is complete. See [facility] admission navigator for allergy information, prior to admission medications and immunization status.     Medication history interview done, indicate source(s): Patient & daughter (daughter very good historian)  Medication history resources (including written lists, pill bottles, clinic record): pill bottle for new montelukast  Pharmacy: Gets meds from [facility]. For discharge daughter wants printed paper Rx's to take to her pharmacy of choice    Changes made to PTA medication list:  Added: montelukast  Changed: warfarin updated dose  Reported as Not Taking: n/a  Removed: lidocaine 4% cream    Actions taken by pharmacist (provider contacted, etc): None     Additional medication history information: None    Medication reconciliation/reorder completed by provider prior to medication history? N (Y/N)     Prior to Admission medications    Medication Sig Last Dose Taking? Auth Provider   ALBUTEROL IN Inhale 6 puffs into the lungs every 8 hours 6 puffs q8h scheduled (Butovent/Salbutamol) 8/10/2021 at Unknown time Yes Unknown, Entered By History   Atorvastatin Calcium (LIPITOR PO) Take 20 mg by mouth every morning  8/10/2021 at Unknown time Yes Reported, Patient   Furosemide (LASIX PO) Take 40 mg by mouth every evening 8/9/2021 at pm Yes Unknown, Entered By History   furosemide (LASIX) 80 MG tablet Take 80 mg by mouth every morning 8/10/2021 at am Yes Unknown, Entered By History   hypromellose-dextran (ARTIFICAL TEARS) 0.1-0.3 % SOLN ophthalmic solution Apply 2-3 drops to eye every hour as needed for dry eyes Past Week at Unknown time Yes Dane Leon MD   ipratropium - albuterol 0.5 mg/2.5 mg/3 mL (DUONEB) 0.5-2.5 (3) MG/3ML neb solution Take 1 vial by nebulization every 6 hours as needed for shortness of breath / dyspnea or wheezing Past Month at Unknown time Yes Unknown, Entered By History   losartan (COZAAR) 25 MG tablet Take  25 mg by mouth daily 8/10/2021 at Unknown time Yes Unknown, Entered By History   montelukast (SINGULAIR) 10 MG tablet Take 10 mg by mouth every evening 8/9/2021 at pm Yes Unknown, Entered By History   omeprazole (PRILOSEC) 20 MG DR capsule Take 20 mg by mouth 2 times daily 8/10/2021 at am Yes Unknown, Entered By History   POTASSIUM CHLORIDE PO Take 500 mg by mouth daily (medication name is cloruro de potasico 500 mg) 8/10/2021 at Unknown time Yes Unknown, Entered By History   rifaximin (XIFAXAN) 200 MG tablet Take 400 mg by mouth 2 times daily Rifaxol (Rifaximina) Uses 200mg tabs, 2 tabs bid (4 tabs/day) on first seven days of each month for diverticulosis. 8/10/2021 at am Yes Unknown, Entered By History   tiotropium (SPIRIVA) 18 MCG inhalation capsule Inhale 18 mcg into the lungs daily 8/10/2021 at Unknown time Yes Reported, Patient   UNABLE TO FIND Inhale 2 puffs into the lungs 2 times daily MEDICATION NAME: Serflu' (salmeterol xinafoate 25 mcg fluticasone propionate 250 mcg)  Inhaler 2 puffs bid. 8/10/2021 at am Yes Unknown, Entered By History   warfarin ANTICOAGULANT (COUMADIN) 2.5 MG tablet Take by mouth every morning Per 8/10/21: 2.5 mg Monday, 5 mg all other days 8/10/2021 at am Yes Unknown, Entered By History

## 2021-08-11 NOTE — H&P
[facility]  Hospitalist H&P    Name: Thierry Samuel      MRN: [id number]  YOB: 1935    Age: 86 year old  Date of admission: 8/10/2021  Primary care provider: [facility]            Assessment and Plan:     Thierry Samuel is a 86 year old Irish-speaking male who lives in [facility], but visits his daughter for 3 months every year with PMH including severe COPD on 3 L via nasal cannula as needed, SUSY using CPAP, severe pulmonary HTN, diastolic CHF, A. fib on warfarin s/pacemaker placement,p HTN, HLD, history of GI bleed, and history chronic left leg wound who presents with shortness of breath.    He lives in [facility] but came to see his daughter in May and has been living with her since that time.  Symptom onset was about 1 week ago.  He reports phlegm and sputum production.  He has home oxygen but has not been wearing it recently.  He denies any chest pain or palpitations.  Symptoms have worsened over the past 1 week.  Daughter noted that he was relatively hypoxic at 90% and looked to be in respiratory distress of brought him to the ED for evaluation.    Here his temperature is 90.6, heart rate 76, blood pressure 119/68, respiratory 24 and oxygen saturation 93% on room air.  Labs show creatinine of 0.85, sodium 137, potassium 4.1, BNP 1063, undetectable troponin, white blood cells 13.5, hemoglobin 13.1, platelets 185.  NR is 2.9.  He is COVID-19 negative by PCR.  Chest x-ray shows evidence of pulmonary edema.  EKG shows rate controlled atrial fibrillation.  He was given IV Lasix, IV ceftriaxone, doxycycline, duonebs, and methylprednisolone and will be admitted for further evaluation.     1. Acute on chronic hypoxic respiratory failure: Reportedly had oxygen saturation of about 90%.  Here her oxygen saturation is low to mid 90s on room air.  We will continue continuous pulse oximetry.  I suspect his respiratory failure is multifactorial including  decompensated diastolic CHF, COPD exacerbation, and possible community-acquired pneumonia.  2. Acute on chronic diastolic CHF: He denies any weight gain.  He has some degree of lower extremity edema.  BNP is elevated and chest x-ray supportive of pulmonary edema.  Continue Lasix 40 mg IV every 8 hours.  Recheck BMP in the morning.  Restrict sodium, check daily weights and strict I's and O's.  3. Acute exacerbation of COPD: He does have bronchospasm and wheezing on examination.  This is reportedly better after nebulizers in the emergency department.  He was given Solu-Medrol.  Will start prednisone 60 mg daily with gradual taper as an outpatient.  Continue scheduled nebulizers.  4. Community acquired pneumonia: We will check procalcitonin.  He has leukocytosis as well as sputum production.  We will continue empiric course of doxycycline.  5. Fibrillation status post pacemaker: Currently rate controlled.  Continue warfarin as dosed by pharmacy.  6. Hypertension: Continue losartan and diuretics.  Blood pressure well controlled.  7. Hyperlipidemia: Continue atorvastatin.    Code status: Full.  Admit to inpatient status.  Prophylaxis: Warfarin.  Disposition: Home 2-3 days.            Chief Complaint:     Shortness of breath.         History of Present Illness:   Thierry Samuel is a 86 year old male who presents with shortness of breath.  History was obtained from my discussion with the patient at the bedside.  I also discussed the case with the ED provider.  The electronic medical record was also reviewed.    He lives in [facility] but came to see his daughter in May and has been living with her since that time.  Symptom onset was about 1 week ago.  He reports phlegm and sputum production.  He has home oxygen but has not been wearing it recently.  He denies any chest pain or palpitations.  Symptoms have worsened over the past 1 week.  Daughter noted that he was relatively hypoxic at 90% and looked to be in  respiratory distress of brought him to the ED for evaluation.    Here his temperature is 90.6, heart rate 76, blood pressure 119/68, respiratory 24 and oxygen saturation 93% on room air.  Labs show creatinine of 0.85, sodium 137, potassium 4.1, BNP 1063, undetectable troponin, white blood cells 13.5, hemoglobin 13.1, platelets 185.  NR is 2.9.  He is COVID-19 negative by PCR.  Chest x-ray shows evidence of pulmonary edema.  EKG shows rate controlled atrial fibrillation.  He was given IV Lasix, IV ceftriaxone, doxycycline, duonebs, and methylprednisolone and will be admitted for further evaluation.             Past Medical History:     Past Medical History:   Diagnosis Date     A-fib (H)     s/p pacemaker     GI bleed      Hypertension      Moderate to severe pulmonary hypertension (H)      SUSY (obstructive sleep apnea)      SUSY (obstructive sleep apnea)              Past Surgical History:     Past Surgical History:   Procedure Laterality Date     APPENDECTOMY       CARDIAC SURGERY               Social History:     Social History     Tobacco Use     Smoking status: Former Smoker     Smokeless tobacco: Never Used   Substance Use Topics     Alcohol use: Yes     Comment: social             Family History:   The family history was fully reviewed and non-contributory in this case.         Allergies:   No Known Allergies          Medications:     Prior to Admission medications    Medication Sig Last Dose Taking? Auth Provider   ALBUTEROL IN Inhale 6 puffs into the lungs every 8 hours 6 puffs q8h scheduled (Butovent/Salbutamol) 8/10/2021 at Unknown time Yes Unknown, Entered By History   Atorvastatin Calcium (LIPITOR PO) Take 20 mg by mouth every morning  8/10/2021 at Unknown time Yes Reported, Patient   Furosemide (LASIX PO) Take 40 mg by mouth every evening 8/9/2021 at pm Yes Unknown, Entered By History   furosemide (LASIX) 80 MG tablet Take 80 mg by mouth every morning 8/10/2021 at am Yes Unknown, Entered By History    hypromellose-dextran (ARTIFICAL TEARS) 0.1-0.3 % SOLN ophthalmic solution Apply 2-3 drops to eye every hour as needed for dry eyes Past Week at Unknown time Yes Dane Leon MD   ipratropium - albuterol 0.5 mg/2.5 mg/3 mL (DUONEB) 0.5-2.5 (3) MG/3ML neb solution Take 1 vial by nebulization every 6 hours as needed for shortness of breath / dyspnea or wheezing Past Month at Unknown time Yes Unknown, Entered By History   losartan (COZAAR) 25 MG tablet Take 25 mg by mouth daily 8/10/2021 at Unknown time Yes Unknown, Entered By History   montelukast (SINGULAIR) 10 MG tablet Take 10 mg by mouth every evening 8/9/2021 at pm Yes Unknown, Entered By History   omeprazole (PRILOSEC) 20 MG DR capsule Take 20 mg by mouth 2 times daily 8/10/2021 at am Yes Unknown, Entered By History   POTASSIUM CHLORIDE PO Take 500 mg by mouth daily (medication name is cloruro de potasico 500 mg) 8/10/2021 at Unknown time Yes Unknown, Entered By History   rifaximin (XIFAXAN) 200 MG tablet Take 400 mg by mouth 2 times daily Rifaxol (Rifaximina) Uses 200mg tabs, 2 tabs bid (4 tabs/day) on first seven days of each month for diverticulosis. 8/10/2021 at am Yes Unknown, Entered By History   tiotropium (SPIRIVA) 18 MCG inhalation capsule Inhale 18 mcg into the lungs daily 8/10/2021 at Unknown time Yes Reported, Patient   UNABLE TO FIND Inhale 2 puffs into the lungs 2 times daily MEDICATION NAME: Serflu' (salmeterol xinafoate 25 mcg fluticasone propionate 250 mcg)  Inhaler 2 puffs bid. 8/10/2021 at am Yes Unknown, Entered By History   warfarin ANTICOAGULANT (COUMADIN) 2.5 MG tablet Take by mouth every morning Per 8/10/21: 2.5 mg Monday, 5 mg all other days 8/10/2021 at am Yes Unknown, Entered By History             Review of Systems:     A Comprehensive greater than 10 system review of systems was carried out.  Pertinent positives and negatives are noted above.  Otherwise negative for contributory information.           Physical Exam:    Blood pressure 109/59, pulse 70, temperature 98.6  F (37  C), temperature source Oral, resp. rate 24, SpO2 90 %.  Wt Readings from Last 1 Encounters:   05/26/21 91.2 kg (201 lb 1 oz)     Exam:  GENERAL: No apparent distress. Awake, alert, and fully oriented.  HEENT: Normocephalic, atraumatic. Extraocular movements intact.  CARDIOVASCULAR: IRR IRR without murmurs or rubs. No S3.  PULMONARY: Coarse to auscultation bilaterally.  ABDOMINAL: Soft, non-tender, non-distended. Bowel sounds normoactive.   EXTREMITIES: No cyanosis or clubbing. No appreciable edema.  NEUROLOGICAL: CN 2-12 grossly intact, no focal neurological deficits.  DERMATOLOGICAL: No rash, ulcer, bruising, nor jaundice.          Data:   EKG:  Personally reviewed.     Laboratory:  Recent Labs   Lab 08/10/21  1733   WBC 13.5*   HGB 13.1*   HCT 41.5   MCV 98        Recent Labs   Lab 08/10/21  1733      POTASSIUM 4.1   CHLORIDE 104   CO2 29   ANIONGAP 4   *   BUN 13   CR 0.85   GFRESTIMATED 79   CAMPOS 8.4*     Recent Labs   Lab 08/10/21  1733   NTBNPI 1,063     Recent Labs   Lab 08/10/21  1733   INR 2.91*     Recent Labs   Lab 08/10/21  1733   TROPONIN <0.015     No results for input(s): CULT in the last 168 hours.    Imaging:  Recent Results (from the past 24 hour(s))   XR Chest Port 1 View    Narrative    EXAM: XR CHEST PORT 1 VIEW  LOCATION: [facility]  DATE/TIME: 8/10/2021 5:42 PM    INDICATION: Dyspnea.  COMPARISON: 5/26/2021.      Impression    IMPRESSION: Right-sided single lead cardiac implantable device redemonstrated. Cardiac silhouette remains enlarged with diffuse interstitial thickening and some patchy opacities of the bases. These findings suggest volume overload/chronic CHF. Central   pulmonary vasculature is prominent. No significant effusions. Osseous structures are similar.       Demarco Diaz DO MPH  [facility] Hospitalist  [address] [place] [address]  08/10/2021

## 2021-08-11 NOTE — PLAN OF CARE
/54   Pulse 71   Temp 99.2  F (37.3  C) (Oral)   Resp 20   Ht 1.524 m (5')   Wt 78.6 kg (173 lb 4.8 oz)   SpO2 91%   BMI 33.85 kg/m      NEURO:A/Ox4, Gibraltarian speaking- daughter translates   PAIN:Denies  TELE:Afib CVR/BBB  IV:SL RA  RESPIRATORY:LS-diminished/ coarse, GUNTER, RA  GI:+BS  :Urinal voids with assistance  SKIN:Intact, trace edema BLE  ACTIVITY:Ax1 gait belt and cane  DIET:Cardiac  PLAN:Continue abx, IV lasix, INOs, scheduled nebs and PO prednisone, continue POC.

## 2021-08-11 NOTE — PLAN OF CARE
Pt and daughter Had mentioned Right ear feeling like water was in it. States Buzzing sound as well. Also his CPAP which is a nose mask is 10 years old, Pt breathes out of mouth so CPAP isn't really effective. He drops his o2 sats during the night. There is no adaptor to CPAP for o2 connection, md notified.     VSS daughter and pt wanting to hold losartan this morning. They stated pt will lower his BP if SBP less than 140 when takes Losartan. Held Losartan per their request. Will continue to monitor. On PO Steroids and PO Doxycyline. Productive thick gray sputum.  Right shoulder pain gave Tylenol, with some relief. Monitoring BG checks.

## 2021-08-11 NOTE — PROGRESS NOTES
"[facility] RCAT  Date: August 11, 2021  Admission Dx: COPD exacerbation  Pulmonary History: COPD, SUSY-uses CPAP  Home Nebulizer/MDI Use: Albuterol MDI prn, Duoneb prn, Spiriva BID  Home Oxygen: 3L NC prn  Acuity Level (RCAT flow sheet): Level 3    Aerosol Therapy initiated: Duoneb QID, prn    Pulmonary Hygiene initiated: Coughing and deep breathing techniques    Volume Expansion initiated: Incentive spirometer    Current Oxygen Requirements: Room air  Current SpO2: 92%    Re-evaluation date: 8/14/2021    Patient Education: Education was provided on RCAT process. Will continue to do education with patient.    See \"RT Assessments\" flow sheet for patient assessment scoring and Acuity Level Details.     Kj Jacobsen, RT on 8/11/2021 at 4:00 PM      "

## 2021-08-11 NOTE — ED NOTES
[facility]  ED Nurse Handoff Report    Thierry Samuel is a 86 year old male   ED Chief complaint: Shortness of Breath  . ED Diagnosis:   Final diagnoses:   COPD exacerbation (H)   Acute on chronic congestive heart failure, unspecified heart failure type (H)   Acute on chronic respiratory failure with hypoxia (H)     Allergies: No Known Allergies    Code Status: Full Code  Activity level - Baseline/Home:  Stand by Assist. Activity Level - Current:   Stand by Assist. Lift room needed: No. Bariatric: No   Needed: Yes   Isolation: No. Infection: Not Applicable.     Vital Signs:   Vitals:    08/10/21 1845 08/10/21 1850 08/10/21 1920 08/10/21 1930   BP:  115/56  105/60   Pulse: 70   68   Resp:       Temp:       TempSrc:       SpO2: 93% 90% 93% 91%       Cardiac Rhythm:  ,   Cardiac  Cardiac Rhythm: Ventricular paced;Atrial fibrillation  Pain level:    Patient confused: No. Patient Falls Risk: Yes.   Elimination Status: Has voided   Patient Report - Initial Complaint: Pt with hx of COPD has home O2 PRN, over the last several days c/o increaesd shortness of breath and inability to clear his cough.  Today pt o2 sat was 68%, daughter place on home o2 with increased sat to 94% . Focused Assessment: SOB, states unable to cough up secretions. No increased JULIETA. Not on home 02 regularly, just PRN.  No CP, fever.   Tests Performed: labs, xray. Abnormal Results:   Labs Ordered and Resulted from Time of ED Arrival Up to the Time of Departure from the ED   COMPREHENSIVE METABOLIC PANEL - Abnormal; Notable for the following components:       Result Value    Calcium 8.4 (*)     Glucose 134 (*)     Protein Total 6.6 (*)     All other components within normal limits   INR - Abnormal; Notable for the following components:    INR 2.91 (*)     All other components within normal limits   BLOOD GAS VENOUS WITH OXYHEMOGLOBIN - Abnormal; Notable for the following components:    pCO2 Venous 51 (*)     pO2 Venous  23 (*)     Bicarbonate Venous 30 (*)     Oxyhemoglobin Venous 38 (*)     Base Excess/Deficit (+/-) 3.6 (*)     All other components within normal limits   CBC WITH PLATELETS AND DIFFERENTIAL - Abnormal; Notable for the following components:    WBC Count 13.5 (*)     RBC Count 4.25 (*)     Hemoglobin 13.1 (*)     Absolute Neutrophils 10.7 (*)     Absolute Lymphocytes 0.6 (*)     Absolute Monocytes 1.8 (*)     Absolute Immature Granulocytes 0.1 (*)     All other components within normal limits   TROPONIN I - Normal   LACTIC ACID WHOLE BLOOD - Normal   NT PROBNP INPATIENT - Normal   SARS-COV2 (COVID-19) VIRUS RT-PCR - Normal    Narrative:     Testing was performed using the shiela  SARS-CoV-2 & Influenza A/B Assay on the shiela  Sera  System.  This test should be ordered for the detection of SARS-COV-2 in individuals who meet SARS-CoV-2 clinical and/or epidemiological criteria. Test performance is unknown in asymptomatic patients.  This test is for in vitro diagnostic use under the FDA EUA for laboratories certified under CLIA to perform moderate and/or high complexity testing. This test has not been FDA cleared or approved.  A negative test does not rule out the presence of PCR inhibitors in the specimen or target RNA in concentration below the limit of detection for the assay. The possibility of a false negative should be considered if the patient's recent exposure or clinical presentation suggests COVID-19.  [facility] are certified under the Clinical Laboratory Improvement Amendments of 1988 (CLIA-88) as qualified to perform moderate and/or high complexity laboratory testing.   CBC WITH PLATELETS & DIFFERENTIAL    Narrative:     The following orders were created for panel order CBC + differential.  Procedure                               Abnormality         Status                     ---------                               -----------         ------                     CBC with platelets and  d...[[id number]]  Abnormal            Final result                 Please view results for these tests on the individual orders.   PERIPHERAL IV CATHETER   COVID-19 VIRUS (CORONAVIRUS) BY PCR    Narrative:     The following orders were created for panel order Symptomatic COVID-19 Virus (Coronavirus) by PCR Nasopharyngeal.  Procedure                               Abnormality         Status                     ---------                               -----------         ------                     SARS-COV2 (COVID-19) Vir...[[id number]]  Normal              Final result                 Please view results for these tests on the individual orders.     XR Chest Port 1 View   Final Result   IMPRESSION: Right-sided single lead cardiac implantable device redemonstrated. Cardiac silhouette remains enlarged with diffuse interstitial thickening and some patchy opacities of the bases. These findings suggest volume overload/chronic CHF. Central    pulmonary vasculature is prominent. No significant effusions. Osseous structures are similar.      .   Treatments provided: See MAR  Family Comments: daughter at bedside   OBS brochure/video discussed/provided to patient:  N/A  ED Medications:   Medications   cefTRIAXone (ROCEPHIN) 2 g vial to attach to  ml bag for ADULTS or NS 50 ml bag for PEDS (2 g Intravenous New Bag 8/10/21 1914)   ipratropium - albuterol 0.5 mg/2.5 mg/3 mL (DUONEB) neb solution 6 mL (6 mLs Nebulization Given 8/10/21 1818)   methylPREDNISolone sodium succinate (solu-MEDROL) injection 125 mg (125 mg Intravenous Given 8/10/21 1819)   furosemide (LASIX) injection 60 mg (60 mg Intravenous Given 8/10/21 1819)   doxycycline hyclate (VIBRAMYCIN) capsule 100 mg (100 mg Oral Given 8/10/21 1915)     Drips infusing:  No  For the majority of the shift, the patient's behavior Green. Interventions performed were n/a.    Sepsis treatment initiated: No     Patient tested for COVID 19 prior to admission: YES    ED Nurse  Name/Phone Number: Isela Sagastume Chan Agustin RN,   7:35 PM    RECEIVING UNIT ED HANDOFF REVIEW    Above ED Nurse Handoff Report was reviewed: Yes   Reviewed by: Ariadna Rod RN on August 10, 2021 at 8:36 PM

## 2021-08-11 NOTE — PROGRESS NOTES
SPIRITUAL HEALTH SERVICES Progress Note  [facility]    Spiritual Health Services consult order for assistance with healthcare directive.  Provided informational resources and education related to directive for pt (Azerbaijani speaking) and daughter, [place] (translated).  Marvin is only child here in the [place]. Pt is from [place] [place] [facility].  Pt would like to review documents  and will request follow up as needed.    Plan: [facility] is available for additional consultation/assistance with healthcare directive.    Nathan Quesada MA  Staff   Pager: [phone]  Phone: [phone]

## 2021-08-11 NOTE — PLAN OF CARE
VSS with the exception of O2 sat dropped to 86% while using the cpap without oxygen. Is using home nasal mask and we are unable to use oxygen with it. Cpap removed and Oxymask applied @ 2L. Increased to 98%. Pt is A/O. Tele: A-fib CVR with BBB. Kenyan speaking. Daughter is his translater and refused translation service. LS diminished. Productive cough. Trace LE edema. RPIV SL.

## 2021-08-11 NOTE — PROGRESS NOTES
RT- Discussed home CPAP unit functionality with daughter. RT will set up a hospital CPAP at night until a new mask can be fitted.     Kj Jacobsen, RT on 8/11/2021 at 4:04 PM

## 2021-08-11 NOTE — PROGRESS NOTES
[facility]    Hospitalist Progress Note  Name: Thierry Samuel    MRN: [id number]  Provider:  Demarco Diaz DO MPH  Date of Service: 08/11/2021    Summary of Stay: Thierry Samuel is a 86 year old [place] citizen-speaking male who lives in [place], but visits his daughter for 3 months every year with PMH including severe COPD on 3 L via nasal cannula as needed, SUSY using CPAP, severe pulmonary HTN, diastolic CHF, A. fib on warfarin s/pacemaker placement,p HTN, HLD, history of GI bleed, and history chronic left leg wound who presents with shortness of breath.     He lives in [facility] but came to see his daughter in May and has been living with her since that time.  Symptom onset was about 1 week ago.  He reports phlegm and sputum production.  He has home oxygen but has not been wearing it recently.  He denies any chest pain or palpitations.  Symptoms have worsened over the past 1 week.  Daughter noted that he was relatively hypoxic at 90% and looked to be in respiratory distress of brought him to the ED for evaluation.     Here his temperature is 90.6, heart rate 76, blood pressure 119/68, respiratory 24 and oxygen saturation 93% on room air.  Labs show creatinine of 0.85, sodium 137, potassium 4.1, BNP 1063, undetectable troponin, white blood cells 13.5, hemoglobin 13.1, platelets 185.  NR is 2.9.  He is COVID-19 negative by PCR.  Chest x-ray shows evidence of pulmonary edema.  EKG shows rate controlled atrial fibrillation.  He was given IV Lasix, IV ceftriaxone, doxycycline, duonebs, and methylprednisolone and will be admitted for further evaluation.      1. Acute on chronic hypoxic respiratory failure: Reportedly had oxygen saturation of about 90%.  Here her oxygen saturation is low to mid 90s on room air.  We will continue continuous pulse oximetry.  I suspect his respiratory failure is multifactorial including decompensated diastolic CHF, COPD exacerbation, and possible  community-acquired pneumonia.  2. Acute on chronic diastolic CHF: He denies any weight gain.  He has some degree of lower extremity edema.  BNP is elevated and chest x-ray supportive of pulmonary edema.  Continue Lasix 40 mg IV every 8 hours.  Recheck BMP in the morning.  Restrict sodium, check daily weights and strict I's and O's.  3. Acute exacerbation of COPD: He does have bronchospasm and wheezing on examination.  This is reportedly better after nebulizers in the emergency department.  He was given Solu-Medrol.  Will continue prednisone 60 mg daily with gradual taper as an outpatient.  Continue scheduled nebulizers.  4. Community acquired pneumonia: We will check procalcitonin.  He has leukocytosis as well as sputum production.  We will continue empiric course of doxycycline.  5. Afib status post pacemaker: Currently rate controlled.  Continue warfarin as dosed by pharmacy.  Needs outpatient pacemaker evaluation and interrogation.  6. Hypertension: Continue losartan and diuretics.  Blood pressure well controlled.  7. Hyperlipidemia: Continue atorvastatin.  8. Hyperglycemia: Could have underlying developing diabetes or steroid-induced hyperglycemia.  Check hemoglobin A1c and start medium sliding scale insulin.    DVT Prophylaxis: Pneumatic Compression Devices  Code Status: Full Code  Diet: Low Saturated Fat Na <2400 mg    Corona Catheter: Not present  Disposition: Expected discharge in 1 days to home. Goals prior to discharge include monitor breathing.   Incidental Findings: As above.  Family updated today: Yes      Interval History   The patient reports doing well. No chest pain or shortness of breath. No nausea, vomiting, diarrhea, constipation. No fevers. No other specific complaints identified.      -Data reviewed today: I personally reviewed all new labs and imaging results over the last 24 hours.     Physical Exam   Temp: 98  F (36.7  C) Temp src: Oral BP: 127/56 Pulse: 64   Resp: 20 SpO2: 92 % O2 Device:  None (Room air) Oxygen Delivery: 2 LPM  Vitals:    08/10/21 2110   Weight: 78.6 kg (173 lb 4.8 oz)     Vital Signs with Ranges  Temp:  [97.9  F (36.6  C)-99.2  F (37.3  C)] 98  F (36.7  C)  Pulse:  [61-79] 64  Resp:  [20-24] 20  BP: (100-127)/(39-68) 127/56  SpO2:  [86 %-99 %] 92 %  I/O last 3 completed shifts:  In: 3 [I.V.:3]  Out: 700 [Urine:700]    GENERAL: No apparent distress. Awake, alert, and fully oriented.  HEENT: Normocephalic, atraumatic. Extraocular movements intact.  CARDIOVASCULAR: Regular rate and rhythm without murmurs or rubs. No S3.  PULMONARY: Coarse bilaterally.  GASTROINTESTINAL: Soft, non-tender, non-distended. Bowel sounds normoactive.   EXTREMITIES: No cyanosis or clubbing. No edema.  NEUROLOGICAL: CN 2-12 grossly intact, no focal neurological deficits.  DERMATOLOGICAL: No rash, ulcer, bruising, nor jaundice.     Medications     - MEDICATION INSTRUCTIONS -       Warfarin Therapy Reminder         atorvastatin  20 mg Oral QAM     doxycycline hyclate  100 mg Oral Q12H JONAS     furosemide  40 mg Intravenous Q8H     ipratropium - albuterol 0.5 mg/2.5 mg/3 mL  3 mL Nebulization 4x daily     losartan  25 mg Oral Daily     montelukast  10 mg Oral QPM     omeprazole  20 mg Oral BID     predniSONE  60 mg Oral Daily     sodium chloride (PF)  3 mL Intracatheter Q8H     umeclidinium  1 puff Inhalation Daily     Data     Laboratory:  Recent Labs   Lab 08/11/21  0602 08/10/21  1733   WBC 9.6 13.5*   HGB 13.2* 13.1*   HCT 42.7 41.5   * 98    185     Recent Labs   Lab 08/11/21  0602 08/10/21  2111 08/10/21  1733     --  137   POTASSIUM 3.9 4.3 4.1   CHLORIDE 105  --  104   CO2 24  --  29   ANIONGAP 6  --  4   *  --  134*   BUN 16  --  13   CR 0.82  --  0.85   GFRESTIMATED 81  --  79   CAMPOS 8.0*  --  8.4*     No results for input(s): CULT in the last 168 hours.    Imaging:  Recent Results (from the past 24 hour(s))   XR Chest Port 1 View    Narrative    EXAM: XR CHEST PORT 1  VIEW  LOCATION: [facility]  DATE/TIME: 8/10/2021 5:42 PM    INDICATION: Dyspnea.  COMPARISON: 5/26/2021.      Impression    IMPRESSION: Right-sided single lead cardiac implantable device redemonstrated. Cardiac silhouette remains enlarged with diffuse interstitial thickening and some patchy opacities of the bases. These findings suggest volume overload/chronic CHF. Central   pulmonary vasculature is prominent. No significant effusions. Osseous structures are similar.         Demarco Diaz DO MPH  [facility] Hospitalist  [address] [place] [address]  08/11/2021

## 2021-08-12 ENCOUNTER — APPOINTMENT (OUTPATIENT)
Dept: CARDIOLOGY | Facility: CLINIC | Age: 86
End: 2021-08-12
Attending: HOSPITALIST
Payer: MEDICAID

## 2021-08-12 ENCOUNTER — DOCUMENTATION ONLY (OUTPATIENT)
Dept: OTHER | Facility: CLINIC | Age: 86
End: 2021-08-12

## 2021-08-12 VITALS
OXYGEN SATURATION: 94 % | HEART RATE: 61 BPM | BODY MASS INDEX: 35.12 KG/M2 | RESPIRATION RATE: 20 BRPM | SYSTOLIC BLOOD PRESSURE: 129 MMHG | HEIGHT: 60 IN | TEMPERATURE: 98 F | DIASTOLIC BLOOD PRESSURE: 54 MMHG | WEIGHT: 178.9 LBS

## 2021-08-12 LAB
ANION GAP SERPL CALCULATED.3IONS-SCNC: 7 MMOL/L (ref 3–14)
BUN SERPL-MCNC: 25 MG/DL (ref 7–30)
CALCIUM SERPL-MCNC: 8.6 MG/DL (ref 8.5–10.1)
CHLORIDE BLD-SCNC: 104 MMOL/L (ref 94–109)
CO2 SERPL-SCNC: 26 MMOL/L (ref 20–32)
CREAT SERPL-MCNC: 0.95 MG/DL (ref 0.66–1.25)
GFR SERPL CREATININE-BSD FRML MDRD: 73 ML/MIN/1.73M2
GLUCOSE BLD-MCNC: 136 MG/DL (ref 70–99)
GLUCOSE BLDC GLUCOMTR-MCNC: 143 MG/DL (ref 70–99)
GLUCOSE BLDC GLUCOMTR-MCNC: 150 MG/DL (ref 70–99)
GLUCOSE BLDC GLUCOMTR-MCNC: 171 MG/DL (ref 70–99)
INR PPP: 3.55 (ref 0.85–1.15)
LVEF ECHO: NORMAL
POTASSIUM BLD-SCNC: 4.1 MMOL/L (ref 3.4–5.3)
POTASSIUM BLD-SCNC: 4.1 MMOL/L (ref 3.4–5.3)
SODIUM SERPL-SCNC: 137 MMOL/L (ref 133–144)

## 2021-08-12 PROCEDURE — 94640 AIRWAY INHALATION TREATMENT: CPT

## 2021-08-12 PROCEDURE — 250N000009 HC RX 250: Performed by: HOSPITALIST

## 2021-08-12 PROCEDURE — 250N000013 HC RX MED GY IP 250 OP 250 PS 637: Performed by: HOSPITALIST

## 2021-08-12 PROCEDURE — 99239 HOSP IP/OBS DSCHRG MGMT >30: CPT | Performed by: HOSPITALIST

## 2021-08-12 PROCEDURE — 94660 CPAP INITIATION&MGMT: CPT

## 2021-08-12 PROCEDURE — 999N000157 HC STATISTIC RCP TIME EA 10 MIN

## 2021-08-12 PROCEDURE — 93306 TTE W/DOPPLER COMPLETE: CPT | Mod: 26 | Performed by: INTERNAL MEDICINE

## 2021-08-12 PROCEDURE — 250N000011 HC RX IP 250 OP 636: Performed by: HOSPITALIST

## 2021-08-12 PROCEDURE — 255N000002 HC RX 255 OP 636: Performed by: HOSPITALIST

## 2021-08-12 PROCEDURE — 80048 BASIC METABOLIC PNL TOTAL CA: CPT | Performed by: HOSPITALIST

## 2021-08-12 PROCEDURE — 84132 ASSAY OF SERUM POTASSIUM: CPT | Performed by: HOSPITALIST

## 2021-08-12 PROCEDURE — 250N000012 HC RX MED GY IP 250 OP 636 PS 637: Performed by: HOSPITALIST

## 2021-08-12 PROCEDURE — 85610 PROTHROMBIN TIME: CPT | Performed by: HOSPITALIST

## 2021-08-12 PROCEDURE — 999N000208 ECHOCARDIOGRAM COMPLETE

## 2021-08-12 PROCEDURE — 36415 COLL VENOUS BLD VENIPUNCTURE: CPT | Performed by: HOSPITALIST

## 2021-08-12 RX ORDER — PREDNISONE 20 MG/1
TABLET ORAL
Qty: 20 TABLET | Refills: 0 | Status: SHIPPED | OUTPATIENT
Start: 2021-08-12 | End: 2021-09-03

## 2021-08-12 RX ORDER — DOXYCYCLINE 100 MG/1
100 CAPSULE ORAL EVERY 12 HOURS
Qty: 6 CAPSULE | Refills: 0 | Status: SHIPPED | OUTPATIENT
Start: 2021-08-12 | End: 2021-08-15

## 2021-08-12 RX ADMIN — IPRATROPIUM BROMIDE AND ALBUTEROL SULFATE 3 ML: .5; 3 SOLUTION RESPIRATORY (INHALATION) at 07:06

## 2021-08-12 RX ADMIN — FUROSEMIDE 40 MG: 10 INJECTION, SOLUTION INTRAMUSCULAR; INTRAVENOUS at 02:28

## 2021-08-12 RX ADMIN — INSULIN ASPART 1 UNITS: 100 INJECTION, SOLUTION INTRAVENOUS; SUBCUTANEOUS at 09:51

## 2021-08-12 RX ADMIN — HUMAN ALBUMIN MICROSPHERES AND PERFLUTREN 3 ML: 10; .22 INJECTION, SOLUTION INTRAVENOUS at 12:51

## 2021-08-12 RX ADMIN — UMECLIDINIUM 1 PUFF: 62.5 AEROSOL, POWDER ORAL at 07:06

## 2021-08-12 RX ADMIN — ACETAMINOPHEN 650 MG: 325 TABLET, FILM COATED ORAL at 08:30

## 2021-08-12 RX ADMIN — DOXYCYCLINE HYCLATE 100 MG: 100 CAPSULE ORAL at 08:31

## 2021-08-12 RX ADMIN — ATORVASTATIN CALCIUM 20 MG: 20 TABLET, FILM COATED ORAL at 08:31

## 2021-08-12 RX ADMIN — OMEPRAZOLE 20 MG: 20 CAPSULE, DELAYED RELEASE ORAL at 08:31

## 2021-08-12 RX ADMIN — ACETAMINOPHEN 650 MG: 325 TABLET, FILM COATED ORAL at 02:28

## 2021-08-12 RX ADMIN — FUROSEMIDE 40 MG: 10 INJECTION, SOLUTION INTRAMUSCULAR; INTRAVENOUS at 09:50

## 2021-08-12 RX ADMIN — ALBUTEROL SULFATE 2.5 MG: 2.5 SOLUTION RESPIRATORY (INHALATION) at 02:32

## 2021-08-12 RX ADMIN — PREDNISONE 60 MG: 50 TABLET ORAL at 08:30

## 2021-08-12 ASSESSMENT — ACTIVITIES OF DAILY LIVING (ADL)
ADLS_ACUITY_SCORE: 18

## 2021-08-12 ASSESSMENT — MIFFLIN-ST. JEOR: SCORE: 1343.99

## 2021-08-12 NOTE — DISCHARGE SUMMARY
Hospitalist Discharge Summary  [facility]    Thierry Samuel MRN# [id number]   YOB: 1935 Age: 86 year old     Date of Admission:  8/10/2021  Date of Discharge:  8/12/2021  Admitting Physician:  Demarco Diaz DO  Discharge Physician:  Demarco Diaz DO  Discharging Service:  Hospitalist     Primary Provider: Clinic, [facility]          Discharge Diagnosis:     Acute on chronic hypoxic respiratory failure  Acute on chronic diastolic CHF  Acute exacerbation of COPD  Community-acquired pneumonia  Atrial fibrillation status post pacemaker  Hypertension  Hyperlipidemia  Steroid-induced hyperglycemia             Discharge Disposition:     Discharged to home           Allergies:     No Known Allergies           Discharge Medications:     Current Discharge Medication List      START taking these medications    Details   carbamide peroxide (DEBROX) 6.5 % otic solution Place 5 drops into the right ear daily as needed for other (wax)  Qty: 100 mL, Refills: 0    Associated Diagnoses: Acute on chronic respiratory failure with hypoxia (H)      doxycycline hyclate (VIBRAMYCIN) 100 MG capsule Take 1 capsule (100 mg) by mouth every 12 hours for 3 days  Qty: 6 capsule, Refills: 0    Associated Diagnoses: Acute on chronic respiratory failure with hypoxia (H)      predniSONE (DELTASONE) 20 MG tablet Take 3 tabs by mouth daily x 3 days, then 2 tabs daily x 3 days, then 1 tab daily x 3 days, then 1/2 tab daily x 3 days.  Qty: 20 tablet, Refills: 0    Associated Diagnoses: Acute on chronic respiratory failure with hypoxia (H)         CONTINUE these medications which have NOT CHANGED    Details   ALBUTEROL IN Inhale 6 puffs into the lungs every 8 hours 6 puffs q8h scheduled (Butovent/Salbutamol)      Atorvastatin Calcium (LIPITOR PO) Take 20 mg by mouth every morning       !! Furosemide (LASIX PO) Take 40 mg by mouth every evening      !! furosemide (LASIX) 80 MG tablet Take 80 mg  by mouth every morning      hypromellose-dextran (ARTIFICAL TEARS) 0.1-0.3 % SOLN ophthalmic solution Apply 2-3 drops to eye every hour as needed for dry eyes  Qty: 30 mL, Refills: 0    Associated Diagnoses: Dry eyes      ipratropium - albuterol 0.5 mg/2.5 mg/3 mL (DUONEB) 0.5-2.5 (3) MG/3ML neb solution Take 1 vial by nebulization every 6 hours as needed for shortness of breath / dyspnea or wheezing      losartan (COZAAR) 25 MG tablet Take 25 mg by mouth daily      montelukast (SINGULAIR) 10 MG tablet Take 10 mg by mouth every evening      omeprazole (PRILOSEC) 20 MG DR capsule Take 20 mg by mouth 2 times daily      POTASSIUM CHLORIDE PO Take 500 mg by mouth daily (medication name is cloruro de potasico 500 mg)      rifaximin (XIFAXAN) 200 MG tablet Take 400 mg by mouth 2 times daily Rifaxol (Rifaximina) Uses 200mg tabs, 2 tabs bid (4 tabs/day) on first seven days of each month for diverticulosis.      tiotropium (SPIRIVA) 18 MCG inhalation capsule Inhale 18 mcg into the lungs daily      UNABLE TO FIND Inhale 2 puffs into the lungs 2 times daily MEDICATION NAME: Serflu' (salmeterol xinafoate 25 mcg fluticasone propionate 250 mcg)  Inhaler 2 puffs bid.      warfarin ANTICOAGULANT (COUMADIN) 2.5 MG tablet Take by mouth every morning Per 8/10/21: 2.5 mg Monday, 5 mg all other days       !! - Potential duplicate medications found. Please discuss with provider.                 Condition on Discharge:     Discharge condition: Stable   Discharge vitals: Blood pressure 129/54, pulse 61, temperature 98  F (36.7  C), temperature source Oral, resp. rate 20, height 1.524 m (5'), weight 81.1 kg (178 lb 14.4 oz), SpO2 94 %.   Code status on discharge: Full Code      BASIC PHYSICAL EXAMINATION:  GENERAL: No apparent distress.  CARDIOVASCULAR: Regular rate and rhythm without murmurs.  PULMONARY: Clear to auscultation bilaterally.   GASTROINTESTINAL: Abdomen soft, non-tender.  EXTREMITIES: No edema, pulses intact.  NEUROLOGIC: No  focal deficits.            History of Illness:   See detailed admission note for full details.               Procedures excluding imaging which is summarized below:     Please see details in the electronic medical record.           Consultations:     PHARMACY TO DOSE WARFARIN  ADVANCE DIRECTIVE IP CONSULT  CARE MANAGEMENT / SOCIAL WORK IP CONSULT          Significant Results:     Results for orders placed or performed during the hospital encounter of 08/10/21   XR Chest Port 1 View    Narrative    EXAM: XR CHEST PORT 1 VIEW  LOCATION: [facility]  DATE/TIME: 8/10/2021 5:42 PM    INDICATION: Dyspnea.  COMPARISON: 5/26/2021.      Impression    IMPRESSION: Right-sided single lead cardiac implantable device redemonstrated. Cardiac silhouette remains enlarged with diffuse interstitial thickening and some patchy opacities of the bases. These findings suggest volume overload/chronic CHF. Central   pulmonary vasculature is prominent. No significant effusions. Osseous structures are similar.       Transthoracic Echocardiogram Results:  No results found for this or any previous visit (from the past 4320 hour(s)).             Pending Results:     Unresulted Labs Ordered in the Past 30 Days of this Admission     No orders found from 7/11/2021 to 8/11/2021.                      Discharge Instructions and Follow-Up:     Discharge instructions and follow-up:   Discharge Procedure Orders   Follow-up and recommended labs and tests    Order Comments: Follow up with primary care provider, [facility], within 7 days to evaluate medication change and for hospital follow- up.  The following labs/tests are recommended: BMP, INR.  Hold warfarin tonight, resume at previous dosing tomorrow night.  Check INR no later than next Monday.  Continue to use oxygen as needed at home.  Follow-up in the cardiology clinic ASAP.  Follow-up in sleep medicine clinic [facility] for management of sleep apnea and home  CPAP.  Follow-up with ENT clinic if right ear discomfort persists.  Might also consider audiology follow-up.  Trial of Debrox eardrops.     Activity   Order Comments: Your activity upon discharge: activity as tolerated     Order Specific Question Answer Comments   Is discharge order? Yes      Diet   Order Comments: Follow this diet upon discharge: Orders Placed This Encounter      Low Saturated Fat Na <2400 mg     Order Specific Question Answer Comments   Is discharge order? Yes              Hospital Course:     Thierry Samuel is a 86 year old Green[place]ic-speaking male who lives in [facility], but visits his daughter for 3 months every year with PMH including severe COPD on 3 L via nasal cannula as needed, SUSY using CPAP, severe pulmonary HTN, diastolic CHF, A. fib on warfarin s/pacemaker placement,p HTN, HLD, history of GI bleed, and history chronic left leg wound who presents with shortness of breath.     He lives in [facility] but came to see his daughter in May and has been living with her since that time.  Symptom onset was about 1 week ago.  He reports phlegm and sputum production.  He has home oxygen but has not been wearing it recently.  He denies any chest pain or palpitations.  Symptoms have worsened over the past 1 week.  Daughter noted that he was relatively hypoxic at 90% and looked to be in respiratory distress of brought him to the ED for evaluation.     Here his temperature is 90.6, heart rate 76, blood pressure 119/68, respiratory 24 and oxygen saturation 93% on room air.  Labs show creatinine of 0.85, sodium 137, potassium 4.1, BNP 1063, undetectable troponin, white blood cells 13.5, hemoglobin 13.1, platelets 185.  NR is 2.9.  He is COVID-19 negative by PCR.  Chest x-ray shows evidence of pulmonary edema.  EKG shows rate controlled atrial fibrillation.  He was given IV Lasix, IV ceftriaxone, doxycycline, duonebs, and methylprednisolone and was admitted for further evaluation.     Since admission he  has gradually felt better.  He has not been hypoxic.  Lungs are now clear.  We have treated someone empirically for decompensated CHF, COPD exacerbation, and community-acquired pneumonia.  Given the good improvement he has had we will proceed with an outpatient prednisone taper as well as as needed nebulizer treatments.  He will complete a routine course of doxycycline for possible pneumonia.  He has not had weight change as an outpatient so we will continue his prior to admission diuretic regimen.    He plans on remaining in [place] in the care of his daughter.  He will establish care with cardiology, sleep medicine, and ENT.  He will need evaluation of his pacemaker and CPAP device to ensure proper functioning.  He currently feels well and appears suitable for discharge home with outpatient follow-up as noted above.     1. Acute on chronic hypoxic respiratory failure: Reportedly had oxygen saturation of about 90% at home.  Here her oxygen saturation is low to mid 90s on room air.  I suspect his respiratory failure is multifactorial including decompensated diastolic CHF, COPD exacerbation, and possible community-acquired pneumonia.  2. Acute on chronic diastolic CHF: He denies any weight gain.  He has some degree of lower extremity edema but not overly impressive.  BNP is elevated and chest x-ray supportive of pulmonary edema.  Started on Lasix 40 mg IV every 8 hours with decreased weight, improved symptoms, and net negative output.  He will remain on his outpatient diuretic regimen on discharge.  3. Acute exacerbation of COPD: He did have bronchospasm and wheezing on examination but lungs are now clear.  Will continue prednisone 60 mg daily with gradual taper as an outpatient.  Continue as needed nebulizers.  4. Community acquired pneumonia: He has leukocytosis as well as sputum production.  We will continue empiric course of doxycycline.  He and his daughter were notified about the interaction with  warfarin.  5. Afib status post pacemaker: Currently rate controlled.  Hold warfarin tonight as mildly supratherapeutic INR.  Resume dosing tomorrow and INR recheck in the clinic in 3 days.  Needs outpatient pacemaker evaluation and interrogation.  Care coordinator planning to arrange cardiology follow-up.  6. Hypertension: Continue losartan and diuretics.  Blood pressure well controlled.  7. Hyperlipidemia: Continue atorvastatin.  8. Hyperglycemia: Likely has steroid-induced hyperglycemia.  Hemoglobin A1c 5.6.  9. Right ear discomfort: Grossly normal examination.  Provide Debrox and consider ENT follow-up.  Already on antibiotics for pneumonia.  10. Obstructive sleep apnea: RT has assisted him with his home CPAP device.  Care coordinator arranging sleep medicine referral.    The patient was seen, examined, and counseled on this day. The hospitalization and plan of care was reviewed with the patient and daughter extensively. All questions were addressed and the patient agreed to follow-up as noted above.      Total time spent in face to face contact with the patient and coordinating discharge was:  35 Minutes    Demarco Diaz DO, MPH  [facility] Hospitalist  [address] [place] [address]  08/12/2021

## 2021-08-12 NOTE — CONSULTS
Care Management Initial Consult    General Information  Assessment completed with: Patient, Children, Dtr [place]  Type of CM/SW Visit: Offer D/C Planning    Primary Care Provider verified and updated as needed: Yes   Readmission within the last 30 days: no previous admission in last 30 days      Reason for Consult: care coordination/care conference, discharge planning    Communication Assessment  Patient's communication style: spoken language (non-English)    Hearing Difficulty or Deaf: no   Wear Glasses or Blind: no    Cognitive  Cognitive/Neuro/Behavioral: WDL                    Living Environment:   People in home: child(fareed), adult, grandchild(fareed)     Current living Arrangements: house      Able to return to prior arrangements: yes     Family/Social Support:  Care provided by: self  Provides care for: no one  Children          Description of Support System: Supportive, Involved    Support Assessment: Adequate family and caregiver support    Current Resources:   Patient receiving home care services: No   Equipment currently used at home: cane, straight, walker, rolling, wheelchair, manual  Supplies currently used at home: Oxygen Tubing/Supplies    Lifestyle & Psychosocial Needs:  Social Determinants of Health     Tobacco Use: Medium Risk     Smoking Tobacco Use: Former Smoker     Smokeless Tobacco Use: Never Used   Alcohol Use:      Frequency of Alcohol Consumption:      Average Number of Drinks:      Frequency of Binge Drinking:    Financial Resource Strain:      Difficulty of Paying Living Expenses:    Food Insecurity:      Worried About Running Out of Food in the Last Year:      Ran Out of Food in the Last Year:    Transportation Needs:      Lack of Transportation (Medical):      Lack of Transportation (Non-Medical):    Physical Activity:      Days of Exercise per Week:      Minutes of Exercise per Session:    Stress:      Feeling of Stress :    Social Connections:      Frequency of Communication with  "Friends and Family:      Frequency of Social Gatherings with Friends and Family:      Attends [employer] Services:      Active Member of Clubs or Organizations:      Attends Club or Organization Meetings:      Marital Status:    Intimate Partner Violence:      Fear of Current or Ex-Partner:      Emotionally Abused:      Physically Abused:      Sexually Abused:    Depression:      PHQ-2 Score:    Housing Stability:      Unable to Pay for Housing in the Last Year:      Number of Places Lived in the Last Year:      Unstable Housing in the Last Year:      Additional Information:  Pt admitted with CHF and COPD, noted to have unplanned readmission risk of 20%. CM also consulted by provider to arrange Cardiology, Sleep and ENT appointments. Met w/ pt and dtr at bedside, dtr interpreting for pt. They report that pt recently moved to the , he is uninsured but has applied for MA. He lives with his dtr [place] and her two children. He is independent with cane at baseline, he uses a WW for longer distances, he also has a portable WC. He has home O2 through [facility] but apparently was not using or requiring this until 2 days before admission. Dtr assists with transportation, medications, cooking and cleaning. They follow a low sodium diet quite closely and cook most meals at home. Pt has a scale and checks his weights each day.     Reviewed COPD and CHF action plans, signs/symptoms to watch for and when to call provider. They are receptive and understanding of this information. Also provided with COPD and CHF education in [place]ian printed from [employer]. Dtr prefers to arrange pt's PCP follow-up appointment. They are agreeable to having CM assist with scheduling the specialty appointments. ENT unable to arrange visit due to not having insurance, they will call pt's dtr to discuss the next steps further. AVS updated.     \"Your Cardiology appointment was scheduled for Monday 8/16 at 9:15am with Dr. Rod at  [facility] " "[facility]  [address] [place] [address]  Phone: [phone]    ENT will call you to discuss how to schedule an appointment with MA pending, they will guide you on the next steps to make.   [facility]  [address]   [place] [address]  Phone: [phone]    Your Sleep Clinic appointment was scheduled for Wednesday 9/29 at 2:30pm with Dr. Goltz   [facility] [place]   [address]  [place] [address]  Phone: [phone]\"    Will continue to follow patient for any additional discharge needs.     Dee Garduno RN BSN   Inpatient Care Coordination  [facility]   Phone [phone]    "

## 2021-08-12 NOTE — ACP (ADVANCE CARE PLANNING)
SPIRITUAL HEALTH SERVICES Progress Note  [facility]    Spiritual Health visit per request for assistance with healthcare directive.  Daughter, [place] is present at bedside. Facilitated notary services via Ipad and scanned signed document to [employer].   Pt anticipates discharge this afternoon.    Nathan Quesada MA  Staff   Pager: [phone]  Phone: [phone]

## 2021-08-12 NOTE — PHARMACY-ANTICOAGULATION SERVICE
"Clinical Pharmacy- Warfarin Discharge Note    Per MD discharge instructions \"Hold warfarin tonight, resume at previous dosing tomorrow night.  Check INR no later than next Monday. \"    Anticoagulation Dose History     Recent Dosing and Labs Latest Ref Rng & Units 9/30/2018 5/26/2021 5/27/2021 5/28/2021 8/10/2021 8/11/2021 8/12/2021    ZZ IMS TEMPLATE - - - 6 mg - - - -    Warfarin 5 mg - - 5 mg - - - - -    INR 0.85 - 1.15 2.00(H) 1.45(H) 1.43(H) 1.70(H) 2.91(H) 3.23(H) 3.55(H)          "

## 2021-08-12 NOTE — DISCHARGE INSTRUCTIONS
Your Cardiology appointment was scheduled for Monday 8/16 at 9:15am with Dr. Rod at  [facility]  [address] [place] [address]   [place] [address]  Phone: [phone]    ENT will call you to discuss how to schedule an appointment with MA pending, they will guide you on the next steps to make.   [facility]  [address]   [place] [address]  Phone: [phone]    Your Sleep Clinic appointment was scheduled for Wednesday 9/29 at 2:30pm with Dr. Goltz   [facility] [place]   [address]  [place] [address]  Phone: [phone]

## 2021-08-12 NOTE — PLAN OF CARE
VSS. A&Ox4. Daughter at bedside for translation, assists with cares. Reports pain to Rt shoulder, PRN tylenol given. Coughing up thick grey sputum, PRN nebulizer given per patient request. Scheduled zosyn given. . Ambulating SBA with daughter. Tele shows Afib CVR with prolonged QT. Discharge 2-4 days.

## 2021-08-12 NOTE — PLAN OF CARE
Vss c/o  chronic right shoulder pain, Tylenol given and ice pack with some relief. Productive sputum, grey. GUNTER. On PO Doxycyline and IV Lasix. Echo results pending. C/o Right ear buzzing/feeling like water in it, md aware. CPAP pt not effective for pt, oxymask used with o2. Pt has PRN o2 at home. Monitoring BG's. A1c 5.6.  Plan for discharge today. Went over discharge instructions with pt and pt's daughter. Questions asked and answered. Both verbalized understanding. Pt discharged at 1510 per wc with all belongings, paperwork and meds.

## 2021-08-12 NOTE — PROGRESS NOTES
Patient was on CPAP of +5, 25% for short of period of time and refused to wear the CPAP for the rest of the night. Pt is currently on 2.5 LPM Oxymask and sating 97%.      Vinny Helton, RT

## 2021-08-16 ENCOUNTER — OFFICE VISIT (OUTPATIENT)
Dept: CARDIOLOGY | Facility: CLINIC | Age: 86
End: 2021-08-16
Payer: MEDICAID

## 2021-08-16 VITALS
WEIGHT: 181.2 LBS | BODY MASS INDEX: 35.57 KG/M2 | OXYGEN SATURATION: 95 % | SYSTOLIC BLOOD PRESSURE: 138 MMHG | DIASTOLIC BLOOD PRESSURE: 70 MMHG | HEART RATE: 59 BPM | HEIGHT: 60 IN

## 2021-08-16 DIAGNOSIS — I48.20 CHRONIC ATRIAL FIBRILLATION (H): ICD-10-CM

## 2021-08-16 DIAGNOSIS — G47.33 OSA (OBSTRUCTIVE SLEEP APNEA): ICD-10-CM

## 2021-08-16 DIAGNOSIS — J44.9 CHRONIC OBSTRUCTIVE PULMONARY DISEASE, UNSPECIFIED COPD TYPE (H): ICD-10-CM

## 2021-08-16 DIAGNOSIS — Z95.0 CARDIAC PACEMAKER IN SITU: ICD-10-CM

## 2021-08-16 DIAGNOSIS — R60.0 BILATERAL LOWER EXTREMITY EDEMA: Primary | ICD-10-CM

## 2021-08-16 PROCEDURE — 99205 OFFICE O/P NEW HI 60 MIN: CPT | Performed by: INTERNAL MEDICINE

## 2021-08-16 RX ORDER — SPIRONOLACTONE 25 MG/1
25 TABLET ORAL DAILY
Qty: 90 TABLET | Refills: 3 | Status: SHIPPED | OUTPATIENT
Start: 2021-08-16

## 2021-08-16 ASSESSMENT — MIFFLIN-ST. JEOR: SCORE: 1354.42

## 2021-08-16 NOTE — PROGRESS NOTES
Cardiology Consultation      Thierry Samuel MRN# [id number]   YOB: 1935 Age: 86 year old   Date of Visit 08/16/2021     Reason for consult:  Establish care           Assessment and Plan:     1. Mild CHF, diastolic    Patient with preserved ejection fraction but COPD and likely right heart congestion    He is compliant on his furosemide.  Will add spironolactone.    Basic metabolic panel in 2 weeks with YARY follow-up.  May need to decrease or stop his potassium supplementation.    I did advise him to use nocturnal oxygen with his CPAP to help offload right heart pressures      2. Lower extremity edema, history of lower extremity injury    Continue conservative management at this point.  Augment diuresis.    Compression stockings in the future if tolerated.      Consider venous competency testing if no improvement in symptoms with diuretics      3. Chronic atrial fibrillation with pacemaker and anticoagulation    Establish care with pacemaker clinic    INR through primary clinician      4. COPD with oxygen    Encouraged patient to use his oxygen to lower his pulmonary pressures    Follow-up in 2 to 4 weeks with YARY and BMP to check how he is doing with the addition of spironolactone and possibly discontinue his potassium supplementation.  Could augment the spironolactone dose if doing well.  Device check in the future.    60 minutes spent today in review of medical record, coordinating care, discussion with the patient and post visit charting.    This note was transcribed using electronic voice recognition software, typographical errors may be present.                Chief Complaint:   Hospital F/U (establish care post-hospital)           History of Present Illness:   This patient is a very pleasant 86 year old male who is [place]oc[place]-speaking, his very pleasant daughter is interpreting for him.    He was recently hospitalized for COPD exacerbation with minor component of CHF exacerbation.  He is  currently on a diuretic.  He has normal renal function.  He denies any history of coronary artery events.  He had normal LV function without wall motion abnormality on echocardiogram during his recent hospitalization.    Troponins were also undetectable at that time.    He notes lower extremity discomfort with edema.  He has had left medial malleoli infection requiring extensive debridement from a previous trauma.  He also has a number of healed wounds on his right lower extremity.  He has stigmata of peripheral venous hypertension.  His legs are uncomfortable and heavy progressing throughout the day.    He has chronic atrial fibrillation and pacemaker implantation.  I do not know if he has AV node ablation.  He has not had device check recently [place]    He is anticoagulated and this is managed by his primary physician.         Physical Exam:     Vitals: /70 (BP Location: Right arm, Patient Position: Sitting, Cuff Size: Adult Regular)   Pulse 59   Ht 1.524 m (5')   Wt 82.2 kg (181 lb 3.2 oz)   SpO2 95%   BMI 35.39 kg/m    Constitutional:  cooperative, alert and oriented, well developed, well nourished, in no acute distress        Skin:  warm and dry to the touch, no apparent skin lesions or masses noted        Head:  normocephalic, no masses or lesions        Eyes:  pupils equal and round, conjunctivae and lids unremarkable, sclera white, no xanthalasma, EOMS intact, no nystagmus        ENT:  no pallor or cyanosis         Chest:  normal symmetry        Cardiac: regular rhythm;normal S1 and S2   distant heart sounds              Abdomen:  BS normoactive        Extremities and Back:      Bilateral lower extremity scars from previous infection and debridement.    Neurological:  no gross motor deficits;affect appropriate                      Past Medical History:   I have reviewed this patient's past medical history  Past Medical History:   Diagnosis Date     A-fib (H)      s/p pacemaker     Cardiac pacemaker in situ      CHF (congestive heart failure) (H) 09/27/2018     COPD (chronic obstructive pulmonary disease) (H)      GI bleed      Hypertension      Long term current use of anticoagulant therapy      Moderate to severe pulmonary hypertension (H)      SUSY (obstructive sleep apnea)      SUSY (obstructive sleep apnea)              Past Surgical History:   I have reviewed this patient's past surgical history  Past Surgical History:   Procedure Laterality Date     APPENDECTOMY       CARDIAC SURGERY                 Social History:   I have reviewed this patient's social history  Social History     Tobacco Use     Smoking status: Former Smoker     Smokeless tobacco: Never Used   Substance Use Topics     Alcohol use: Yes     Comment: social             Family History:   I have reviewed this patient's family history  History reviewed. No pertinent family history.          Allergies:   No Known Allergies          Medications:   I have reviewed this patient's current medications  Current Outpatient Medications   Medication Sig Dispense Refill     ALBUTEROL IN Inhale 6 puffs into the lungs every 8 hours 6 puffs q8h scheduled (Butovent/Salbutamol)       Atorvastatin Calcium (LIPITOR PO) Take 20 mg by mouth every morning        carbamide peroxide (DEBROX) 6.5 % otic solution Place 5 drops into the right ear daily as needed for other (wax) 100 mL 0     Furosemide (LASIX PO) Take 40 mg by mouth every evening       furosemide (LASIX) 80 MG tablet Take 80 mg by mouth every morning       hypromellose-dextran (ARTIFICAL TEARS) 0.1-0.3 % SOLN ophthalmic solution Apply 2-3 drops to eye every hour as needed for dry eyes 30 mL 0     ipratropium - albuterol 0.5 mg/2.5 mg/3 mL (DUONEB) 0.5-2.5 (3) MG/3ML neb solution Take 1 vial by nebulization every 6 hours as needed for shortness of breath / dyspnea or wheezing       losartan (COZAAR) 25 MG tablet Take 25 mg by mouth daily       montelukast (SINGULAIR)  10 MG tablet Take 10 mg by mouth every evening       omeprazole (PRILOSEC) 20 MG DR capsule Take 20 mg by mouth 2 times daily       POTASSIUM CHLORIDE PO Take 500 mg by mouth daily (medication name is cloruro de potasico 500 mg)       predniSONE (DELTASONE) 20 MG tablet Take 3 tabs by mouth daily x 3 days, then 2 tabs daily x 3 days, then 1 tab daily x 3 days, then 1/2 tab daily x 3 days. 20 tablet 0     rifaximin (XIFAXAN) 200 MG tablet Take 400 mg by mouth 2 times daily Rifaxol (Rifaximina) Uses 200mg tabs, 2 tabs bid (4 tabs/day) on first seven days of each month for diverticulosis.       spironolactone (ALDACTONE) 25 MG tablet Take 1 tablet (25 mg) by mouth daily 90 tablet 3     tiotropium (SPIRIVA) 18 MCG inhalation capsule Inhale 18 mcg into the lungs daily       UNABLE TO FIND Inhale 2 puffs into the lungs 2 times daily MEDICATION NAME: Serflu' (salmeterol xinafoate 25 mcg fluticasone propionate 250 mcg)  Inhaler 2 puffs bid.       warfarin ANTICOAGULANT (COUMADIN) 2.5 MG tablet Take by mouth every morning Per 8/10/21: 2.5 mg Monday, 5 mg all other days                 Review of Systems:       Review of Systems:  Skin:  Negative     Eyes:  Negative    ENT:  Negative    Respiratory:  Positive for dyspnea on exertion;shortness of breath;sleep apnea  Cardiovascular:  Negative    Gastroenterology: Negative    Genitourinary:  Negative    Musculoskeletal:  Negative    Neurologic:  Negative    Psychiatric:  Negative    Heme/Lymph/Imm:  Negative    Endocrine:  Negative                       Data:   All available laboratory data reviewed  No results found for: CHOL  No results found for: HDL  No results found for: LDL  No results found for: TRIG  No results found for: CHOLHDLRATIO  TSH   Date Value Ref Range Status   09/28/2018 1.21 0.40 - 4.00 mU/L Final     Last Basic Metabolic Panel:  Lab Results   Component Value Date     08/12/2021     05/28/2021      Lab Results   Component Value Date    POTASSIUM  4.1 08/12/2021    POTASSIUM 4.1 08/12/2021    POTASSIUM 3.8 05/28/2021     Lab Results   Component Value Date    CHLORIDE 104 08/12/2021    CHLORIDE 104 05/28/2021     Lab Results   Component Value Date    CAMPOS 8.6 08/12/2021    CAMPOS 8.3 05/28/2021     Lab Results   Component Value Date    CO2 26 08/12/2021    CO2 30 05/28/2021     Lab Results   Component Value Date    BUN 25 08/12/2021    BUN 26 05/28/2021     Lab Results   Component Value Date    CR 0.95 08/12/2021    CR 0.78 05/28/2021     Lab Results   Component Value Date     08/12/2021     08/12/2021    GLC 99 05/28/2021     Lab Results   Component Value Date    WBC 9.6 08/11/2021    WBC 9.2 05/27/2021     Lab Results   Component Value Date    RBC 4.22 08/11/2021    RBC 4.22 05/27/2021     Lab Results   Component Value Date    HGB 13.2 08/11/2021    HGB 13.3 05/27/2021     Lab Results   Component Value Date    HCT 42.7 08/11/2021    HCT 41.2 05/27/2021     Lab Results   Component Value Date     08/11/2021    MCV 98 05/27/2021     Lab Results   Component Value Date    MCH 31.3 08/11/2021    MCH 31.5 05/27/2021     Lab Results   Component Value Date    MCHC 30.9 08/11/2021    MCHC 32.3 05/27/2021     Lab Results   Component Value Date    RDW 13.0 08/11/2021    RDW 13.2 05/27/2021     Lab Results   Component Value Date     08/11/2021     05/27/2021

## 2021-08-16 NOTE — LETTER
8/16/2021    [facility]  [address] [address]    RE: Thierry Samuel       Dear Colleague,    I had the pleasure of seeing Thierry Samuel in the [facility].    Cardiology Consultation      Thierry Samuel MRN# [id number]   YOB: 1935 Age: 86 year old   Date of Visit 08/16/2021     Reason for consult:  Establish care           Assessment and Plan:     1. Mild CHF, diastolic    Patient with preserved ejection fraction but COPD and likely right heart congestion    He is compliant on his furosemide.  Will add spironolactone.    Basic metabolic panel in 2 weeks with YARY follow-up.  May need to decrease or stop his potassium supplementation.    I did advise him to use nocturnal oxygen with his CPAP to help offload right heart pressures      2. Lower extremity edema, history of lower extremity injury    Continue conservative management at this point.  Augment diuresis.    Compression stockings in the future if tolerated.      Consider venous competency testing if no improvement in symptoms with diuretics      3. Chronic atrial fibrillation with pacemaker and anticoagulation    Establish care with pacemaker clinic    INR through primary clinician      4. COPD with oxygen    Encouraged patient to use his oxygen to lower his pulmonary pressures    Follow-up in 2 to 4 weeks with YARY and BMP to check how he is doing with the addition of spironolactone and possibly discontinue his potassium supplementation.  Could augment the spironolactone dose if doing well.  Device check in the future.    60 minutes spent today in review of medical record, coordinating care, discussion with the patient and post visit charting.    This note was transcribed using electronic voice recognition software, typographical errors may be present.                Chief Complaint:   Hospital F/U (establish care post-hospital)            History of Present Illness:   This patient is a very pleasant 86 year old male who is [place]alander-speaking, his very pleasant daughter is interpreting for him.    He was recently hospitalized for COPD exacerbation with minor component of CHF exacerbation.  He is currently on a diuretic.  He has normal renal function.  He denies any history of coronary artery events.  He had normal LV function without wall motion abnormality on echocardiogram during his recent hospitalization.    Troponins were also undetectable at that time.    He notes lower extremity discomfort with edema.  He has had left medial malleoli infection requiring extensive debridement from a previous trauma.  He also has a number of healed wounds on his right lower extremity.  He has stigmata of peripheral venous hypertension.  His legs are uncomfortable and heavy progressing throughout the day.    He has chronic atrial fibrillation and pacemaker implantation.  I do not know if he has AV node ablation.  He has not had device check recently and [place]    He is anticoagulated and this is managed by his primary physician.         Physical Exam:     Vitals: /70 (BP Location: Right arm, Patient Position: Sitting, Cuff Size: Adult Regular)   Pulse 59   Ht 1.524 m (5')   Wt 82.2 kg (181 lb 3.2 oz)   SpO2 95%   BMI 35.39 kg/m    Constitutional:  cooperative, alert and oriented, well developed, well nourished, in no acute distress        Skin:  warm and dry to the touch, no apparent skin lesions or masses noted        Head:  normocephalic, no masses or lesions        Eyes:  pupils equal and round, conjunctivae and lids unremarkable, sclera white, no xanthalasma, EOMS intact, no nystagmus        ENT:  no pallor or cyanosis         Chest:  normal symmetry        Cardiac: regular rhythm;normal S1 and S2   distant heart sounds              Abdomen:  BS normoactive        Extremities and Back:      Bilateral lower extremity  scars from previous infection and debridement.    Neurological:  no gross motor deficits;affect appropriate                      Past Medical History:   I have reviewed this patient's past medical history  Past Medical History:   Diagnosis Date     A-fib (H)     s/p pacemaker     Cardiac pacemaker in situ      CHF (congestive heart failure) (H) 09/27/2018     COPD (chronic obstructive pulmonary disease) (H)      GI bleed      Hypertension      Long term current use of anticoagulant therapy      Moderate to severe pulmonary hypertension (H)      SUSY (obstructive sleep apnea)      SUSY (obstructive sleep apnea)              Past Surgical History:   I have reviewed this patient's past surgical history  Past Surgical History:   Procedure Laterality Date     APPENDECTOMY       CARDIAC SURGERY                 Social History:   I have reviewed this patient's social history  Social History     Tobacco Use     Smoking status: Former Smoker     Smokeless tobacco: Never Used   Substance Use Topics     Alcohol use: Yes     Comment: social             Family History:   I have reviewed this patient's family history  History reviewed. No pertinent family history.          Allergies:   No Known Allergies          Medications:   I have reviewed this patient's current medications  Current Outpatient Medications   Medication Sig Dispense Refill     ALBUTEROL IN Inhale 6 puffs into the lungs every 8 hours 6 puffs q8h scheduled (Butovent/Salbutamol)       Atorvastatin Calcium (LIPITOR PO) Take 20 mg by mouth every morning        carbamide peroxide (DEBROX) 6.5 % otic solution Place 5 drops into the right ear daily as needed for other (wax) 100 mL 0     Furosemide (LASIX PO) Take 40 mg by mouth every evening       furosemide (LASIX) 80 MG tablet Take 80 mg by mouth every morning       hypromellose-dextran (ARTIFICAL TEARS) 0.1-0.3 % SOLN ophthalmic solution Apply 2-3 drops to eye every hour as needed for dry eyes 30 mL 0     ipratropium -  albuterol 0.5 mg/2.5 mg/3 mL (DUONEB) 0.5-2.5 (3) MG/3ML neb solution Take 1 vial by nebulization every 6 hours as needed for shortness of breath / dyspnea or wheezing       losartan (COZAAR) 25 MG tablet Take 25 mg by mouth daily       montelukast (SINGULAIR) 10 MG tablet Take 10 mg by mouth every evening       omeprazole (PRILOSEC) 20 MG DR capsule Take 20 mg by mouth 2 times daily       POTASSIUM CHLORIDE PO Take 500 mg by mouth daily (medication name is cloruro de potasico 500 mg)       predniSONE (DELTASONE) 20 MG tablet Take 3 tabs by mouth daily x 3 days, then 2 tabs daily x 3 days, then 1 tab daily x 3 days, then 1/2 tab daily x 3 days. 20 tablet 0     rifaximin (XIFAXAN) 200 MG tablet Take 400 mg by mouth 2 times daily Rifaxol (Rifaximina) Uses 200mg tabs, 2 tabs bid (4 tabs/day) on first seven days of each month for diverticulosis.       spironolactone (ALDACTONE) 25 MG tablet Take 1 tablet (25 mg) by mouth daily 90 tablet 3     tiotropium (SPIRIVA) 18 MCG inhalation capsule Inhale 18 mcg into the lungs daily       UNABLE TO FIND Inhale 2 puffs into the lungs 2 times daily MEDICATION NAME: Serflu' (salmeterol xinafoate 25 mcg fluticasone propionate 250 mcg)  Inhaler 2 puffs bid.       warfarin ANTICOAGULANT (COUMADIN) 2.5 MG tablet Take by mouth every morning Per 8/10/21: 2.5 mg Monday, 5 mg all other days                 Review of Systems:       Review of Systems:  Skin:  Negative     Eyes:  Negative    ENT:  Negative    Respiratory:  Positive for dyspnea on exertion;shortness of breath;sleep apnea  Cardiovascular:  Negative    Gastroenterology: Negative    Genitourinary:  Negative    Musculoskeletal:  Negative    Neurologic:  Negative    Psychiatric:  Negative    Heme/Lymph/Imm:  Negative    Endocrine:  Negative                       Data:   All available laboratory data reviewed  No results found for: CHOL  No results found for: HDL  No results found for: LDL  No results found for: TRIG  No results  found for: CHOLHDLRATIO  TSH   Date Value Ref Range Status   09/28/2018 1.21 0.40 - 4.00 mU/L Final     Last Basic Metabolic Panel:  Lab Results   Component Value Date     08/12/2021     05/28/2021      Lab Results   Component Value Date    POTASSIUM 4.1 08/12/2021    POTASSIUM 4.1 08/12/2021    POTASSIUM 3.8 05/28/2021     Lab Results   Component Value Date    CHLORIDE 104 08/12/2021    CHLORIDE 104 05/28/2021     Lab Results   Component Value Date    CAMPOS 8.6 08/12/2021    CAMPOS 8.3 05/28/2021     Lab Results   Component Value Date    CO2 26 08/12/2021    CO2 30 05/28/2021     Lab Results   Component Value Date    BUN 25 08/12/2021    BUN 26 05/28/2021     Lab Results   Component Value Date    CR 0.95 08/12/2021    CR 0.78 05/28/2021     Lab Results   Component Value Date     08/12/2021     08/12/2021    GLC 99 05/28/2021     Lab Results   Component Value Date    WBC 9.6 08/11/2021    WBC 9.2 05/27/2021     Lab Results   Component Value Date    RBC 4.22 08/11/2021    RBC 4.22 05/27/2021     Lab Results   Component Value Date    HGB 13.2 08/11/2021    HGB 13.3 05/27/2021     Lab Results   Component Value Date    HCT 42.7 08/11/2021    HCT 41.2 05/27/2021     Lab Results   Component Value Date     08/11/2021    MCV 98 05/27/2021     Lab Results   Component Value Date    MCH 31.3 08/11/2021    MCH 31.5 05/27/2021     Lab Results   Component Value Date    MCHC 30.9 08/11/2021    MCHC 32.3 05/27/2021     Lab Results   Component Value Date    RDW 13.0 08/11/2021    RDW 13.2 05/27/2021     Lab Results   Component Value Date     08/11/2021     05/27/2021           Thank you for allowing me to participate in the care of your patient.      Sincerely,     Yo Rod MD     [facility]  cc:   No referring provider defined for this encounter.

## 2021-08-25 ENCOUNTER — ANCILLARY PROCEDURE (OUTPATIENT)
Dept: CARDIOLOGY | Facility: CLINIC | Age: 86
End: 2021-08-25
Attending: INTERNAL MEDICINE
Payer: MEDICAID

## 2021-08-25 ENCOUNTER — LAB (OUTPATIENT)
Dept: LAB | Facility: CLINIC | Age: 86
End: 2021-08-25
Payer: MEDICAID

## 2021-08-25 DIAGNOSIS — I44.30 AV BLOCK: Primary | ICD-10-CM

## 2021-08-25 DIAGNOSIS — R60.0 BILATERAL LOWER EXTREMITY EDEMA: ICD-10-CM

## 2021-08-25 DIAGNOSIS — Z95.0 CARDIAC PACEMAKER IN SITU: ICD-10-CM

## 2021-08-25 DIAGNOSIS — I48.20 CHRONIC ATRIAL FIBRILLATION (H): ICD-10-CM

## 2021-08-25 LAB
ANION GAP SERPL CALCULATED.3IONS-SCNC: 4 MMOL/L (ref 3–14)
BUN SERPL-MCNC: 22 MG/DL (ref 7–30)
CALCIUM SERPL-MCNC: 8.9 MG/DL (ref 8.5–10.1)
CHLORIDE BLD-SCNC: 104 MMOL/L (ref 94–109)
CO2 SERPL-SCNC: 30 MMOL/L (ref 20–32)
CREAT SERPL-MCNC: 0.99 MG/DL (ref 0.66–1.25)
GFR SERPL CREATININE-BSD FRML MDRD: 69 ML/MIN/1.73M2
GLUCOSE BLD-MCNC: 116 MG/DL (ref 70–99)
MDC_IDC_LEAD_IMPLANT_DT: NORMAL
MDC_IDC_LEAD_IMPLANT_DT: NORMAL
MDC_IDC_LEAD_LOCATION: NORMAL
MDC_IDC_LEAD_LOCATION: NORMAL
MDC_IDC_LEAD_LOCATION_DETAIL_1: NORMAL
MDC_IDC_LEAD_LOCATION_DETAIL_1: NORMAL
MDC_IDC_LEAD_MFG: NORMAL
MDC_IDC_LEAD_MFG: NORMAL
MDC_IDC_LEAD_MODEL: NORMAL
MDC_IDC_LEAD_MODEL: NORMAL
MDC_IDC_MSMT_BATTERY_DTM: NORMAL
MDC_IDC_MSMT_BATTERY_IMPEDANCE: 1537 OHM
MDC_IDC_MSMT_BATTERY_REMAINING_LONGEVITY: 49 MO
MDC_IDC_MSMT_BATTERY_STATUS: NORMAL
MDC_IDC_MSMT_BATTERY_VOLTAGE: 2.76 V
MDC_IDC_MSMT_LEADCHNL_RA_IMPEDANCE_VALUE: 67 OHM
MDC_IDC_MSMT_LEADCHNL_RV_IMPEDANCE_VALUE: 743 OHM
MDC_IDC_MSMT_LEADCHNL_RV_PACING_THRESHOLD_AMPLITUDE: 0.5 V
MDC_IDC_MSMT_LEADCHNL_RV_PACING_THRESHOLD_AMPLITUDE: 0.5 V
MDC_IDC_MSMT_LEADCHNL_RV_PACING_THRESHOLD_PULSEWIDTH: 0.4 MS
MDC_IDC_MSMT_LEADCHNL_RV_PACING_THRESHOLD_PULSEWIDTH: 0.4 MS
MDC_IDC_MSMT_LEADCHNL_RV_SENSING_INTR_AMPL: 5.6 MV
MDC_IDC_PG_IMPLANT_DTM: NORMAL
MDC_IDC_PG_MFG: NORMAL
MDC_IDC_PG_MODEL: NORMAL
MDC_IDC_PG_SERIAL: NORMAL
MDC_IDC_PG_TYPE: NORMAL
MDC_IDC_SESS_CLINIC_NAME: NORMAL
MDC_IDC_SESS_DTM: NORMAL
MDC_IDC_SESS_TYPE: NORMAL
MDC_IDC_SET_BRADY_LOWRATE: 60 {BEATS}/MIN
MDC_IDC_SET_BRADY_MAX_SENSOR_RATE: 130 {BEATS}/MIN
MDC_IDC_SET_BRADY_MAX_TRACKING_RATE: 105 {BEATS}/MIN
MDC_IDC_SET_BRADY_MODE: NORMAL
MDC_IDC_SET_LEADCHNL_RV_PACING_AMPLITUDE: 2 V
MDC_IDC_SET_LEADCHNL_RV_PACING_CAPTURE_MODE: NORMAL
MDC_IDC_SET_LEADCHNL_RV_PACING_POLARITY: NORMAL
MDC_IDC_SET_LEADCHNL_RV_PACING_PULSEWIDTH: 0.4 MS
MDC_IDC_SET_LEADCHNL_RV_SENSING_POLARITY: NORMAL
MDC_IDC_SET_LEADCHNL_RV_SENSING_SENSITIVITY: 2 MV
MDC_IDC_SET_ZONE_DETECTION_INTERVAL: 333.33 MS
MDC_IDC_SET_ZONE_TYPE: NORMAL
MDC_IDC_SET_ZONE_TYPE: NORMAL
MDC_IDC_STAT_AT_BURDEN_PERCENT: 0 %
MDC_IDC_STAT_AT_DTM_END: NORMAL
MDC_IDC_STAT_AT_DTM_START: NORMAL
MDC_IDC_STAT_BRADY_DTM_END: NORMAL
MDC_IDC_STAT_BRADY_DTM_START: NORMAL
MDC_IDC_STAT_BRADY_RV_PERCENT_PACED: 55 %
MDC_IDC_STAT_EPISODE_RECENT_COUNT: 0
MDC_IDC_STAT_EPISODE_RECENT_COUNT: 0
MDC_IDC_STAT_EPISODE_RECENT_COUNT_DTM_END: NORMAL
MDC_IDC_STAT_EPISODE_RECENT_COUNT_DTM_END: NORMAL
MDC_IDC_STAT_EPISODE_RECENT_COUNT_DTM_START: NORMAL
MDC_IDC_STAT_EPISODE_RECENT_COUNT_DTM_START: NORMAL
MDC_IDC_STAT_EPISODE_TYPE: NORMAL
MDC_IDC_STAT_EPISODE_TYPE: NORMAL
POTASSIUM BLD-SCNC: 4.7 MMOL/L (ref 3.4–5.3)
SODIUM SERPL-SCNC: 138 MMOL/L (ref 133–144)

## 2021-08-25 PROCEDURE — 36415 COLL VENOUS BLD VENIPUNCTURE: CPT

## 2021-08-25 PROCEDURE — 80048 BASIC METABOLIC PNL TOTAL CA: CPT

## 2021-08-30 ENCOUNTER — TELEPHONE (OUTPATIENT)
Dept: CARDIOLOGY | Facility: CLINIC | Age: 86
End: 2021-08-30

## 2021-08-30 ENCOUNTER — OFFICE VISIT (OUTPATIENT)
Dept: CARDIOLOGY | Facility: CLINIC | Age: 86
End: 2021-08-30
Attending: INTERNAL MEDICINE
Payer: MEDICAID

## 2021-08-30 VITALS
SYSTOLIC BLOOD PRESSURE: 114 MMHG | HEIGHT: 60 IN | WEIGHT: 173 LBS | DIASTOLIC BLOOD PRESSURE: 65 MMHG | BODY MASS INDEX: 33.96 KG/M2 | HEART RATE: 76 BPM

## 2021-08-30 DIAGNOSIS — G47.33 OSA (OBSTRUCTIVE SLEEP APNEA): ICD-10-CM

## 2021-08-30 DIAGNOSIS — I50.812 CHRONIC RIGHT-SIDED HEART FAILURE (H): ICD-10-CM

## 2021-08-30 DIAGNOSIS — R60.0 BILATERAL LOWER EXTREMITY EDEMA: Primary | ICD-10-CM

## 2021-08-30 DIAGNOSIS — I48.20 CHRONIC ATRIAL FIBRILLATION (H): ICD-10-CM

## 2021-08-30 DIAGNOSIS — J44.9 CHRONIC OBSTRUCTIVE PULMONARY DISEASE, UNSPECIFIED COPD TYPE (H): ICD-10-CM

## 2021-08-30 DIAGNOSIS — Z95.0 CARDIAC PACEMAKER IN SITU: Primary | ICD-10-CM

## 2021-08-30 DIAGNOSIS — Z95.0 CARDIAC PACEMAKER IN SITU: ICD-10-CM

## 2021-08-30 PROCEDURE — 99215 OFFICE O/P EST HI 40 MIN: CPT | Performed by: PHYSICIAN ASSISTANT

## 2021-08-30 ASSESSMENT — MIFFLIN-ST. JEOR: SCORE: 1317.22

## 2021-08-30 NOTE — TELEPHONE ENCOUNTER
Patients daughter returned call. Patients heart rates are 74bpm. Patient shortness of breath has gotten worse and daughter is very concerned. Patient is scheduled for follow up with Sarah Lehman in [place] today. Unfortunately there is not a device RN in [place] today, scheduled patient to be seen in device clinic in [place] on 8/31 at 8:15AM to change PPM back to VVI 60.

## 2021-08-30 NOTE — TELEPHONE ENCOUNTER
Patients daughter called stating patient is feeling more short of breath and heart rates have been higher since seen in pacemaker clinic on 8/25/2021.     Patient seen on 8/25/2021 to establish care. Device programmed from VVI 60 to VVIR 60 to help with activity intolerance.     Called daughter Virginia, asking what patients heart rates have been and if shortness of breath has gotten worse. Patient has history of COPD. Left message asking daughter to return call to discuss options.

## 2021-08-30 NOTE — PATIENT INSTRUCTIONS
Thank you for your [facility] visit today. Your provider has recommended the following:  Medication Changes:  Since you are having leg cramps let's try decreasing your diuretics  STOP taking the afternoon dose of furosemide  Continue furosemide 80mg and spironolactone 25mg daily in the AM  STOP your potassium pill  Please call if his weight is going up, taylor 3 lbs in a day or 5lbs in a week  Recommendations:  Pacemaker appt tomorrow  Follow-up:  See Sarah CARTAGENA for cardiology follow up in 1 month with labs piror.    Reminder:  Please bring in your current medication list or your medication, over the counter supplements and vitamin bottles as we will review these at each office visit.

## 2021-08-30 NOTE — LETTER
8/30/2021    [facility]  [address] [address]    RE: Thierry Love Lizzie       Dear Colleague,    I had the pleasure of seeing Thierry Samuel in the [facility].    SERVICE DATE: 8/30/2021     Primary Cardiologist: Dr Rod    REASON FOR VISIT:  Thierry Samuel presents for a 2 week follow up on his LE edema after being started on spironolactone. I am meeting him for the first time. His daughter is with and acts as our  per his request.     HISTORY OF PRESENT ILLNESS/ASSESSMENT AND PLAN:  Cardiovascular history includes A. fib on warfarin, s/p pacemaker placement, HTN, HLD and HFpEF-probably mostly right HF/pulmonary HTN. Also has severe COPD on 3 L via nasal cannula as needed, SUSY using CPAP and , history of GI bleed and history chronic left leg wound. Was in to establish care with Dr Rod mid Aug and was noted to have significant LE edema, probably mostly related to right HF. Spironolactone 25mg daily started.    LE edema significantly improved and weight is down. Pt was feeling better with increased energy until he had his PPM check/reprogrammed on 8/25. Since then he has felt fatigued and worsening GUNTER. They have been in contact with the device clinic and will be coming in again tomorrow for programming changes. He also has been having significant cramping in his legs (and hands?) since starting the spironolactone.      1. LE edema, likely multifactorial but mostly related to R HF.   -Significant improvement with spironolactone. BMP stable.  -Unfortunately has developed cramping. Will try stopping his PM dose of furosemide (40mg) and his potassium supplement.   -Continue  furosemide 80mg and spironolactone 25mg daily in the AM  -Call if his weight is going up, taylor 3 lbs in a day or 5lbs in a week    2. S/P PPM  -will see device clinic tomorrow    3. Chronic afib  -On warfarin for AC    4.  HFpEF-probably mostly right HF/pulmonary HTN.    5. COPD with oxygen/SUSY    Follow up: with me in 3-4 weeks with a BMP prior.  Of course, the patient was encouraged to contact us sooner with questions or concerns.    42 minutes spent on the date of the encounter doing chart review, review of test results, patient visit and documentation     Sarah Lehman PA-C      CURRENT MEDICATIONS:   Current Outpatient Medications   Medication Sig Dispense Refill     ALBUTEROL IN Inhale 6 puffs into the lungs every 8 hours 6 puffs q8h scheduled (Butovent/Salbutamol)       Atorvastatin Calcium (LIPITOR PO) Take 20 mg by mouth every morning        carbamide peroxide (DEBROX) 6.5 % otic solution Place 5 drops into the right ear daily as needed for other (wax) 100 mL 0     Furosemide (LASIX PO) Take 40 mg by mouth every evening       furosemide (LASIX) 80 MG tablet Take 80 mg by mouth every morning       hypromellose-dextran (ARTIFICAL TEARS) 0.1-0.3 % SOLN ophthalmic solution Apply 2-3 drops to eye every hour as needed for dry eyes 30 mL 0     ipratropium - albuterol 0.5 mg/2.5 mg/3 mL (DUONEB) 0.5-2.5 (3) MG/3ML neb solution Take 1 vial by nebulization every 6 hours as needed for shortness of breath / dyspnea or wheezing       montelukast (SINGULAIR) 10 MG tablet Take 10 mg by mouth every evening       omeprazole (PRILOSEC) 20 MG DR capsule Take 20 mg by mouth 2 times daily       POTASSIUM CHLORIDE PO Take 500 mg by mouth daily (medication name is cloruro de potasico 500 mg)       spironolactone (ALDACTONE) 25 MG tablet Take 1 tablet (25 mg) by mouth daily 90 tablet 3     tiotropium (SPIRIVA) 18 MCG inhalation capsule Inhale 18 mcg into the lungs daily       UNABLE TO FIND Inhale 2 puffs into the lungs 2 times daily MEDICATION NAME: Serflu' (salmeterol xinafoate 25 mcg fluticasone propionate 250 mcg)  Inhaler 2 puffs bid.       warfarin ANTICOAGULANT (COUMADIN) 2.5 MG tablet Take by mouth every morning Per 8/10/21: 2.5 mg  Monday, 5 mg all other days       losartan (COZAAR) 25 MG tablet Take 25 mg by mouth daily (Patient not taking: Reported on 8/30/2021)       predniSONE (DELTASONE) 20 MG tablet Take 3 tabs by mouth daily x 3 days, then 2 tabs daily x 3 days, then 1 tab daily x 3 days, then 1/2 tab daily x 3 days. (Patient not taking: Reported on 8/30/2021) 20 tablet 0     rifaximin (XIFAXAN) 200 MG tablet Take 400 mg by mouth 2 times daily Rifaxol (Rifaximina) Uses 200mg tabs, 2 tabs bid (4 tabs/day) on first seven days of each month for diverticulosis. (Patient not taking: Reported on 8/30/2021)         ALLERGIES:  No Known Allergies    ROS:  Skin:  Positive for bruising   Eyes:  Positive for visual blurring  ENT:  Positive for hearing loss  Respiratory:  Positive for shortness of breath;dyspnea on exertion;cough  Cardiovascular:    Positive for  Gastroenterology: Negative    Genitourinary:  not assessed    Musculoskeletal:  Positive for back pain  Neurologic:  Positive for numbness or tingling of hands;numbness or tingling of feet  Psychiatric:  Negative    Heme/Lymph/Imm:  Positive for allergies  Endocrine:  Negative      PHYSICAL EXAMINATION:    GENERAL:  The patient is a pleasant 86 year old who appears their stated age.  In no apparent distress.  VITAL SIGNS:  /65   Pulse 76   Ht 1.524 m (5')   Wt 78.5 kg (173 lb)   BMI 33.79 kg/m      RESPIRATORY:  Breathing is nonlabored.  Lungs decreased air movement, scattered crackles throughout  CARDIAC:  RRR  EXTREMITIES:  1+ BLE edema  NEUROLOGIC:  Alert and oriented.    DATA REVIEWED:    Component      Latest Ref Rng & Units 8/25/2021   Sodium      133 - 144 mmol/L 138   Potassium      3.4 - 5.3 mmol/L 4.7   Chloride      94 - 109 mmol/L 104   Carbon Dioxide      20 - 32 mmol/L 30   Anion Gap      3 - 14 mmol/L 4   Urea Nitrogen      7 - 30 mg/dL 22   Creatinine      0.66 - 1.25 mg/dL 0.99   Calcium      8.5 - 10.1 mg/dL 8.9   Glucose      70 - 99 mg/dL 116 (H)   GFR  Estimate      >60 mL/min/1.73m2 69       Thank you for allowing me to participate in the care of your patient.      Sincerely,     Sarah Lehman PA-C     [facility]  cc:   Yo Rod MD  [address]  [place] [address]

## 2021-08-31 ENCOUNTER — ANCILLARY PROCEDURE (OUTPATIENT)
Dept: CARDIOLOGY | Facility: CLINIC | Age: 86
End: 2021-08-31
Attending: INTERNAL MEDICINE
Payer: MEDICAID

## 2021-08-31 DIAGNOSIS — Z95.0 CARDIAC PACEMAKER IN SITU: ICD-10-CM

## 2021-09-01 LAB
MDC_IDC_LEAD_IMPLANT_DT: NORMAL
MDC_IDC_LEAD_IMPLANT_DT: NORMAL
MDC_IDC_LEAD_LOCATION: NORMAL
MDC_IDC_LEAD_LOCATION: NORMAL
MDC_IDC_LEAD_LOCATION_DETAIL_1: NORMAL
MDC_IDC_LEAD_LOCATION_DETAIL_1: NORMAL
MDC_IDC_LEAD_MFG: NORMAL
MDC_IDC_LEAD_MFG: NORMAL
MDC_IDC_LEAD_MODEL: NORMAL
MDC_IDC_LEAD_MODEL: NORMAL
MDC_IDC_MSMT_BATTERY_DTM: NORMAL
MDC_IDC_MSMT_BATTERY_IMPEDANCE: 1569 OHM
MDC_IDC_MSMT_BATTERY_REMAINING_LONGEVITY: 46 MO
MDC_IDC_MSMT_BATTERY_STATUS: NORMAL
MDC_IDC_MSMT_BATTERY_VOLTAGE: 2.75 V
MDC_IDC_MSMT_LEADCHNL_RA_IMPEDANCE_VALUE: 67 OHM
MDC_IDC_MSMT_LEADCHNL_RV_IMPEDANCE_VALUE: 721 OHM
MDC_IDC_MSMT_LEADCHNL_RV_PACING_THRESHOLD_AMPLITUDE: 0.62 V
MDC_IDC_MSMT_LEADCHNL_RV_PACING_THRESHOLD_PULSEWIDTH: 0.4 MS
MDC_IDC_MSMT_LEADCHNL_RV_SENSING_INTR_AMPL: 5.6 MV
MDC_IDC_PG_IMPLANT_DTM: NORMAL
MDC_IDC_PG_MFG: NORMAL
MDC_IDC_PG_MODEL: NORMAL
MDC_IDC_PG_SERIAL: NORMAL
MDC_IDC_PG_TYPE: NORMAL
MDC_IDC_SESS_CLINIC_NAME: NORMAL
MDC_IDC_SESS_DTM: NORMAL
MDC_IDC_SESS_TYPE: NORMAL
MDC_IDC_SET_BRADY_HYSTRATE: NORMAL
MDC_IDC_SET_BRADY_LOWRATE: 60 {BEATS}/MIN
MDC_IDC_SET_BRADY_MAX_TRACKING_RATE: 105 {BEATS}/MIN
MDC_IDC_SET_BRADY_MODE: NORMAL
MDC_IDC_SET_LEADCHNL_RV_PACING_AMPLITUDE: 2 V
MDC_IDC_SET_LEADCHNL_RV_PACING_CAPTURE_MODE: NORMAL
MDC_IDC_SET_LEADCHNL_RV_PACING_POLARITY: NORMAL
MDC_IDC_SET_LEADCHNL_RV_PACING_PULSEWIDTH: 0.4 MS
MDC_IDC_SET_LEADCHNL_RV_SENSING_POLARITY: NORMAL
MDC_IDC_SET_LEADCHNL_RV_SENSING_SENSITIVITY: 2 MV
MDC_IDC_SET_ZONE_DETECTION_INTERVAL: 333.33 MS
MDC_IDC_SET_ZONE_TYPE: NORMAL
MDC_IDC_SET_ZONE_TYPE: NORMAL
MDC_IDC_STAT_AT_BURDEN_PERCENT: 0 %
MDC_IDC_STAT_AT_DTM_END: NORMAL
MDC_IDC_STAT_AT_DTM_START: NORMAL
MDC_IDC_STAT_BRADY_DTM_END: NORMAL
MDC_IDC_STAT_BRADY_DTM_START: NORMAL
MDC_IDC_STAT_BRADY_RV_PERCENT_PACED: 92 %
MDC_IDC_STAT_EPISODE_RECENT_COUNT: 0
MDC_IDC_STAT_EPISODE_RECENT_COUNT: 0
MDC_IDC_STAT_EPISODE_RECENT_COUNT_DTM_END: NORMAL
MDC_IDC_STAT_EPISODE_RECENT_COUNT_DTM_END: NORMAL
MDC_IDC_STAT_EPISODE_RECENT_COUNT_DTM_START: NORMAL
MDC_IDC_STAT_EPISODE_RECENT_COUNT_DTM_START: NORMAL
MDC_IDC_STAT_EPISODE_TYPE: NORMAL
MDC_IDC_STAT_EPISODE_TYPE: NORMAL

## 2021-09-03 ENCOUNTER — ANCILLARY PROCEDURE (OUTPATIENT)
Dept: GENERAL RADIOLOGY | Facility: CLINIC | Age: 86
End: 2021-09-03
Attending: FAMILY MEDICINE
Payer: MEDICAID

## 2021-09-03 ENCOUNTER — OFFICE VISIT (OUTPATIENT)
Dept: FAMILY MEDICINE | Facility: CLINIC | Age: 86
End: 2021-09-03
Payer: MEDICAID

## 2021-09-03 VITALS
TEMPERATURE: 98.1 F | SYSTOLIC BLOOD PRESSURE: 120 MMHG | RESPIRATION RATE: 16 BRPM | BODY MASS INDEX: 33.69 KG/M2 | DIASTOLIC BLOOD PRESSURE: 70 MMHG | HEART RATE: 71 BPM | HEIGHT: 60 IN | WEIGHT: 171.6 LBS | OXYGEN SATURATION: 94 %

## 2021-09-03 DIAGNOSIS — R91.8 LUNG FIELD ABNORMAL FINDING ON EXAMINATION: ICD-10-CM

## 2021-09-03 DIAGNOSIS — H61.21 IMPACTED CERUMEN OF RIGHT EAR: ICD-10-CM

## 2021-09-03 DIAGNOSIS — I50.9 CONGESTIVE HEART FAILURE, UNSPECIFIED HF CHRONICITY, UNSPECIFIED HEART FAILURE TYPE (H): Primary | ICD-10-CM

## 2021-09-03 DIAGNOSIS — I50.9 CONGESTIVE HEART FAILURE, UNSPECIFIED HF CHRONICITY, UNSPECIFIED HEART FAILURE TYPE (H): ICD-10-CM

## 2021-09-03 DIAGNOSIS — R53.83 FATIGUE, UNSPECIFIED TYPE: ICD-10-CM

## 2021-09-03 LAB
ERYTHROCYTE [DISTWIDTH] IN BLOOD BY AUTOMATED COUNT: 13.3 % (ref 10–15)
HCT VFR BLD AUTO: 42.5 % (ref 40–53)
HGB BLD-MCNC: 14 G/DL (ref 13.3–17.7)
MCH RBC QN AUTO: 31.1 PG (ref 26.5–33)
MCHC RBC AUTO-ENTMCNC: 32.9 G/DL (ref 31.5–36.5)
MCV RBC AUTO: 94 FL (ref 78–100)
PLATELET # BLD AUTO: 183 10E3/UL (ref 150–450)
RBC # BLD AUTO: 4.5 10E6/UL (ref 4.4–5.9)
WBC # BLD AUTO: 8.6 10E3/UL (ref 4–11)

## 2021-09-03 PROCEDURE — 36415 COLL VENOUS BLD VENIPUNCTURE: CPT | Performed by: FAMILY MEDICINE

## 2021-09-03 PROCEDURE — 99204 OFFICE O/P NEW MOD 45 MIN: CPT | Mod: 25 | Performed by: FAMILY MEDICINE

## 2021-09-03 PROCEDURE — 85027 COMPLETE CBC AUTOMATED: CPT | Performed by: FAMILY MEDICINE

## 2021-09-03 PROCEDURE — 84443 ASSAY THYROID STIM HORMONE: CPT | Performed by: FAMILY MEDICINE

## 2021-09-03 PROCEDURE — 69210 REMOVE IMPACTED EAR WAX UNI: CPT | Mod: RT | Performed by: FAMILY MEDICINE

## 2021-09-03 PROCEDURE — 71046 X-RAY EXAM CHEST 2 VIEWS: CPT | Performed by: RADIOLOGY

## 2021-09-03 PROCEDURE — 80048 BASIC METABOLIC PNL TOTAL CA: CPT | Performed by: FAMILY MEDICINE

## 2021-09-03 ASSESSMENT — MIFFLIN-ST. JEOR: SCORE: 1310.87

## 2021-09-03 NOTE — PROGRESS NOTES
Assessment & Plan   See after visit summary for helpful information and advice given to patient.    Congestive heart failure, unspecified HF chronicity, unspecified heart failure type (H)  Patient is on ideal treatment with breathing appears nonlabored at time of exam.  No change recommended in treatment at this time.  - XR Chest 2 Views  - Basic metabolic panel  (Ca, Cl, CO2, Creat, Gluc, K, Na, BUN)    Lung field abnormal finding on examination  Bilateral crackles in bases may be chronic.  Will reassess at future exam.  - XR Chest 2 Views    Impacted cerumen of right ear  Patient and daughter given advice on home treatments.  - REMOVE IMPACTED CERUMEN    Fatigue, unspecified type  No clear cause per lab review.  Further management of pacemaker should be done by cardiology which was explained to patient and daughter at time of exam.  - CBC with platelets  - TSH with free T4 reflex        30 minutes spent on the date of the encounter doing chart review, history and exam, documentation and further activities per the note       BMI:   Estimated body mass index is 33.51 kg/m  as calculated from the following:    Height as of this encounter: 1.524 m (5').    Weight as of this encounter: 77.8 kg (171 lb 9.6 oz).           Return in about 3 months (around 12/3/2021) for Routine preventive, with me.    Myke Herrmann, DO  [facility] Guadarrama is a 86 year old who presents for the following health issues  accompanied by his daughter:    \[facility] of [place] [facility]\""       Hospital Follow-up Visit:    Hospital/Nursing Home/IP Rehab Facility: [facility]  Date of Admission: 8/10/2021  Date of Discharge: 8/12/2021  Reason(s) for Admission: COPD      Was your hospitalization related to COVID-19? No   Problems taking medications regularly:  None  Medication changes since discharge: yes  Problems adhering to non-medication therapy:  None  Patient has low energy, fatigue,     Summary of  hospitalization:  [facility] discharge summary reviewed  Diagnostic Tests/Treatments reviewed.  Follow up needed: none  Other Healthcare Providers Involved in Patient s Care:         None  Update since discharge: improved.       Post Discharge Medication Reconciliation: discharge medications reconciled, continue medications without change.  Plan of care communicated with patient                  Review of Systems   Check in questions and answers reviewed. Patient is seen for chief concern of needing follow-up after recent hospitalization for management of congestive heart failure.    Patient is seen accompanied by his daughter, who helps with language interpretation.    Leg swelling and pain are improved with new diuretic therapy of furosemide + spironolactone.     His potassium supplement was recently stopped with the addition of spironolactone therapy.     He has worsening cramps in arms and legs since starting new diuretic management.     Breathing is about the same from when he saw the cardiologist within the past week.    He feels more fatigued since most recent pacemaker changes.  He feels the fatigue is directly related to the pacemaker changes.    No recent fevers or chills.     He is needing to use the wheelchair more due to weakness.     He stopped smoking in 1971.    Daughter has tried using debrox for about a week to treat decreased hearing which makes her suspect her father has a cerumen impaction.     Patient lives with his daughter.         Objective    /70   Pulse 71   Temp 98.1  F (36.7  C) (Tympanic)   Resp 16   Ht 1.524 m (5')   Wt 77.8 kg (171 lb 9.6 oz)   SpO2 94%   BMI 33.51 kg/m    Body mass index is 33.51 kg/m .  Physical Exam   Vital signs reviewed.  Patient is in no acute appearing distress.  Breathing appears nonlabored.  Patient is alert and oriented ×3.  Patient is very pleasant, making good eye contact and responding with clear fluent speech.  No  conversational dyspnea.    ENT exam: On initial exam, patient has a right ear canal cerumen impaction which was not successfully cleared by irrigation.  I attempted to clear the cerumen using a plastic ear pick which patient did not tolerate due to discomfort.  Left ear exam shows clear TM without injection, and no canal abnormalities.  Nasal exam shows no turbinate injection or edema or abnormal rhinorrhea.  No pharyngeal injection or exudate with no excessive postnasal drainage noted.    Neck: supple with no adenoapthy, palpable abnormal masses, or thyroid abnormality.    Heart and lung exam: Patient has bilateral crackles in lung bases with diminished air flow right lower lung.  Upper lobes are clear to auscultation with good airflow bilaterally.  No accessory respiratory muscle use noted.    Skin/extremities: Warm and dry, with no ankle edema.    Results for orders placed or performed in visit on 09/03/21   XR Chest 2 Views     Status: None    Narrative    CHEST TWO VIEWS 9/3/2021 12:01 PM     HISTORY: Congestive heart failure, unspecified HF chronicity,  unspecified heart failure type (H); Lung field abnormal finding on  examination.    COMPARISON: August 10, 2021       Impression    IMPRESSION: There are no acute infiltrates. The cardiac silhouette is  prominent but stable. Stable cardiac device. Pulmonary vasculature is  unremarkable.    JOAQUIN CHRISTIANSEN MD         SYSTEM ID:  [id number]   Results for orders placed or performed in visit on 09/03/21   CBC with platelets     Status: Normal   Result Value Ref Range    WBC Count 8.6 4.0 - 11.0 10e3/uL    RBC Count 4.50 4.40 - 5.90 10e6/uL    Hemoglobin 14.0 13.3 - 17.7 g/dL    Hematocrit 42.5 40.0 - 53.0 %    MCV 94 78 - 100 fL    MCH 31.1 26.5 - 33.0 pg    MCHC 32.9 31.5 - 36.5 g/dL    RDW 13.3 10.0 - 15.0 %    Platelet Count 183 150 - 450 10e3/uL   TSH with free T4 reflex     Status: Normal   Result Value Ref Range    TSH 0.83 0.40 - 4.00 mU/L   Basic metabolic  panel  (Ca, Cl, CO2, Creat, Gluc, K, Na, BUN)     Status: Abnormal   Result Value Ref Range    Sodium 133 133 - 144 mmol/L    Potassium 4.4 3.4 - 5.3 mmol/L    Chloride 103 94 - 109 mmol/L    Carbon Dioxide (CO2) 25 20 - 32 mmol/L    Anion Gap 5 3 - 14 mmol/L    Urea Nitrogen 15 7 - 30 mg/dL    Creatinine 0.89 0.66 - 1.25 mg/dL    Calcium 8.6 8.5 - 10.1 mg/dL    Glucose 136 (H) 70 - 99 mg/dL    GFR Estimate 78 >60 mL/min/1.73m2     I reviewed the chest x-ray with patient and daughter at time of exam, and did not note any significant acute problems.    Lab results not available for review at time of exam.  Please see result note.

## 2021-09-03 NOTE — PATIENT INSTRUCTIONS
I will review the studies via [employer]. If not clear reason for the increased fatigue, I suggest calling the cardiology clinic to try to be seen again regarding the pacemaker settings. Otherwise, continue current management. You can get debrox like treatments with an irrigation bulb. I suggest repeating this treatment until improvement, and we can recheck ear at next exam. We can check cholesterol at next exam if fasting.

## 2021-09-03 NOTE — LETTER
September 7, 2021      Thierry Samuel  [address] [address]        Dear Mr.Teliz Samuel,    We are writing to inform you of your test results.    [place]lo Mr. Ivan Samuel and family!     All of your lab results are back from your recent visit with me.  They are overall reassuring!     The kidney function and electrolytes are all normal.  The TSH study which assesses thyroid function is normal.  The CBC or complete blood count showed normal red and white blood cells, platelets, and hemoglobin.  The only abnormality was a mildly elevated serum glucose level which I do not feel needs treatment at this time.  We can reassess it again at a later visit.  These labs were not done fasting, which may explain the mild elevation in lab result.     Please contact me if you have any further concerns or questions!      Resulted Orders   CBC with platelets   Result Value Ref Range    WBC Count 8.6 4.0 - 11.0 10e3/uL    RBC Count 4.50 4.40 - 5.90 10e6/uL    Hemoglobin 14.0 13.3 - 17.7 g/dL    Hematocrit 42.5 40.0 - 53.0 %    MCV 94 78 - 100 fL    MCH 31.1 26.5 - 33.0 pg    MCHC 32.9 31.5 - 36.5 g/dL    RDW 13.3 10.0 - 15.0 %    Platelet Count 183 150 - 450 10e3/uL   TSH with free T4 reflex   Result Value Ref Range    TSH 0.83 0.40 - 4.00 mU/L   Basic metabolic panel  (Ca, Cl, CO2, Creat, Gluc, K, Na, BUN)   Result Value Ref Range    Sodium 133 133 - 144 mmol/L    Potassium 4.4 3.4 - 5.3 mmol/L    Chloride 103 94 - 109 mmol/L    Carbon Dioxide (CO2) 25 20 - 32 mmol/L    Anion Gap 5 3 - 14 mmol/L    Urea Nitrogen 15 7 - 30 mg/dL    Creatinine 0.89 0.66 - 1.25 mg/dL    Calcium 8.6 8.5 - 10.1 mg/dL    Glucose 136 (H) 70 - 99 mg/dL    GFR Estimate 78 >60 mL/min/1.73m2      Comment:      As of July 11, 2021, eGFR is calculated by the CKD-EPI creatinine equation, without race adjustment. eGFR can be influenced by muscle mass, exercise, and diet. The reported eGFR is an estimation only and is only  applicable if the renal function is stable.       If you have any questions or concerns, please call the clinic at the number listed above.       Sincerely,      Myke Herrmann, DO

## 2021-09-04 ENCOUNTER — HOSPITAL ENCOUNTER (INPATIENT)
Facility: CLINIC | Age: 86
LOS: 2 days | Discharge: HOME | End: 2021-09-06
Attending: EMERGENCY MEDICINE | Admitting: HOSPITALIST
Payer: MEDICAID

## 2021-09-04 ENCOUNTER — TELEPHONE (OUTPATIENT)
Facility: CLINIC | Age: 86
End: 2021-09-04

## 2021-09-04 ENCOUNTER — APPOINTMENT (OUTPATIENT)
Dept: GENERAL RADIOLOGY | Facility: CLINIC | Age: 86
End: 2021-09-04
Attending: EMERGENCY MEDICINE
Payer: MEDICAID

## 2021-09-04 DIAGNOSIS — J96.21 ACUTE ON CHRONIC RESPIRATORY FAILURE WITH HYPOXIA (H): Primary | ICD-10-CM

## 2021-09-04 DIAGNOSIS — J44.1 COPD EXACERBATION (H): ICD-10-CM

## 2021-09-04 DIAGNOSIS — M79.10 MUSCLE PAIN: ICD-10-CM

## 2021-09-04 DIAGNOSIS — R53.1 WEAKNESS GENERALIZED: ICD-10-CM

## 2021-09-04 DIAGNOSIS — I48.91 ATRIAL FIBRILLATION, UNSPECIFIED TYPE (H): ICD-10-CM

## 2021-09-04 DIAGNOSIS — R06.00 DYSPNEA, UNSPECIFIED TYPE: ICD-10-CM

## 2021-09-04 LAB
ALBUMIN SERPL-MCNC: 3.2 G/DL (ref 3.4–5)
ALBUMIN UR-MCNC: NEGATIVE MG/DL
ALP SERPL-CCNC: 104 U/L (ref 40–150)
ALT SERPL W P-5'-P-CCNC: 23 U/L (ref 0–70)
ANION GAP SERPL CALCULATED.3IONS-SCNC: 1 MMOL/L (ref 3–14)
ANION GAP SERPL CALCULATED.3IONS-SCNC: 5 MMOL/L (ref 3–14)
APPEARANCE UR: CLEAR
AST SERPL W P-5'-P-CCNC: 19 U/L (ref 0–45)
BASOPHILS # BLD AUTO: 0 10E3/UL (ref 0–0.2)
BASOPHILS NFR BLD AUTO: 1 %
BILIRUB DIRECT SERPL-MCNC: 0.1 MG/DL (ref 0–0.2)
BILIRUB SERPL-MCNC: 0.5 MG/DL (ref 0.2–1.3)
BILIRUB UR QL STRIP: NEGATIVE
BUN SERPL-MCNC: 15 MG/DL (ref 7–30)
BUN SERPL-MCNC: 20 MG/DL (ref 7–30)
CALCIUM SERPL-MCNC: 8.6 MG/DL (ref 8.5–10.1)
CALCIUM SERPL-MCNC: 8.7 MG/DL (ref 8.5–10.1)
CHLORIDE BLD-SCNC: 103 MMOL/L (ref 94–109)
CHLORIDE BLD-SCNC: 105 MMOL/L (ref 94–109)
CK SERPL-CCNC: 70 U/L (ref 30–300)
CO2 SERPL-SCNC: 25 MMOL/L (ref 20–32)
CO2 SERPL-SCNC: 30 MMOL/L (ref 20–32)
COLOR UR AUTO: NORMAL
CREAT SERPL-MCNC: 0.89 MG/DL (ref 0.66–1.25)
CREAT SERPL-MCNC: 0.96 MG/DL (ref 0.66–1.25)
EOSINOPHIL # BLD AUTO: 0.6 10E3/UL (ref 0–0.7)
EOSINOPHIL NFR BLD AUTO: 9 %
ERYTHROCYTE [DISTWIDTH] IN BLOOD BY AUTOMATED COUNT: 13.2 % (ref 10–15)
GFR SERPL CREATININE-BSD FRML MDRD: 72 ML/MIN/1.73M2
GFR SERPL CREATININE-BSD FRML MDRD: 78 ML/MIN/1.73M2
GLUCOSE BLD-MCNC: 136 MG/DL (ref 70–99)
GLUCOSE BLD-MCNC: 83 MG/DL (ref 70–99)
GLUCOSE UR STRIP-MCNC: NEGATIVE MG/DL
HCT VFR BLD AUTO: 42.9 % (ref 40–53)
HGB BLD-MCNC: 14 G/DL (ref 13.3–17.7)
HGB UR QL STRIP: NEGATIVE
HOLD SPECIMEN: NORMAL
IMM GRANULOCYTES # BLD: 0 10E3/UL
IMM GRANULOCYTES NFR BLD: 0 %
INR PPP: 4.43 (ref 0.85–1.15)
KETONES UR STRIP-MCNC: NEGATIVE MG/DL
LEUKOCYTE ESTERASE UR QL STRIP: NEGATIVE
LYMPHOCYTES # BLD AUTO: 0.9 10E3/UL (ref 0.8–5.3)
LYMPHOCYTES NFR BLD AUTO: 13 %
MCH RBC QN AUTO: 32 PG (ref 26.5–33)
MCHC RBC AUTO-ENTMCNC: 32.6 G/DL (ref 31.5–36.5)
MCV RBC AUTO: 98 FL (ref 78–100)
MONOCYTES # BLD AUTO: 1.1 10E3/UL (ref 0–1.3)
MONOCYTES NFR BLD AUTO: 16 %
NEUTROPHILS # BLD AUTO: 4.3 10E3/UL (ref 1.6–8.3)
NEUTROPHILS NFR BLD AUTO: 61 %
NITRATE UR QL: NEGATIVE
NRBC # BLD AUTO: 0 10E3/UL
NRBC BLD AUTO-RTO: 0 /100
NT-PROBNP SERPL-MCNC: 812 PG/ML (ref 0–1800)
PH UR STRIP: 5 [PH] (ref 5–7)
PLATELET # BLD AUTO: 197 10E3/UL (ref 150–450)
POTASSIUM BLD-SCNC: 4.4 MMOL/L (ref 3.4–5.3)
POTASSIUM BLD-SCNC: 4.8 MMOL/L (ref 3.4–5.3)
PROT SERPL-MCNC: 7.1 G/DL (ref 6.8–8.8)
RBC # BLD AUTO: 4.38 10E6/UL (ref 4.4–5.9)
RBC URINE: 1 /HPF
SARS-COV-2 RNA RESP QL NAA+PROBE: NEGATIVE
SODIUM SERPL-SCNC: 133 MMOL/L (ref 133–144)
SODIUM SERPL-SCNC: 136 MMOL/L (ref 133–144)
SP GR UR STRIP: 1.02 (ref 1–1.03)
TROPONIN I SERPL-MCNC: <0.015 UG/L (ref 0–0.04)
TROPONIN I SERPL-MCNC: <0.015 UG/L (ref 0–0.04)
TSH SERPL DL<=0.005 MIU/L-ACNC: 0.83 MU/L (ref 0.4–4)
UROBILINOGEN UR STRIP-MCNC: NORMAL MG/DL
WBC # BLD AUTO: 7.1 10E3/UL (ref 4–11)
WBC URINE: <1 /HPF

## 2021-09-04 PROCEDURE — 36415 COLL VENOUS BLD VENIPUNCTURE: CPT | Performed by: EMERGENCY MEDICINE

## 2021-09-04 PROCEDURE — 82248 BILIRUBIN DIRECT: CPT | Performed by: EMERGENCY MEDICINE

## 2021-09-04 PROCEDURE — 99223 1ST HOSP IP/OBS HIGH 75: CPT | Mod: AI | Performed by: HOSPITALIST

## 2021-09-04 PROCEDURE — 80048 BASIC METABOLIC PNL TOTAL CA: CPT | Performed by: EMERGENCY MEDICINE

## 2021-09-04 PROCEDURE — 82550 ASSAY OF CK (CPK): CPT | Performed by: EMERGENCY MEDICINE

## 2021-09-04 PROCEDURE — 99285 EMERGENCY DEPT VISIT HI MDM: CPT | Mod: 25

## 2021-09-04 PROCEDURE — 250N000013 HC RX MED GY IP 250 OP 250 PS 637: Performed by: HOSPITALIST

## 2021-09-04 PROCEDURE — 93005 ELECTROCARDIOGRAM TRACING: CPT

## 2021-09-04 PROCEDURE — C9803 HOPD COVID-19 SPEC COLLECT: HCPCS

## 2021-09-04 PROCEDURE — 250N000012 HC RX MED GY IP 250 OP 636 PS 637: Performed by: EMERGENCY MEDICINE

## 2021-09-04 PROCEDURE — 71045 X-RAY EXAM CHEST 1 VIEW: CPT

## 2021-09-04 PROCEDURE — 83880 ASSAY OF NATRIURETIC PEPTIDE: CPT | Performed by: EMERGENCY MEDICINE

## 2021-09-04 PROCEDURE — 85610 PROTHROMBIN TIME: CPT | Performed by: EMERGENCY MEDICINE

## 2021-09-04 PROCEDURE — 85025 COMPLETE CBC W/AUTO DIFF WBC: CPT | Performed by: EMERGENCY MEDICINE

## 2021-09-04 PROCEDURE — 84484 ASSAY OF TROPONIN QUANT: CPT | Performed by: EMERGENCY MEDICINE

## 2021-09-04 PROCEDURE — 87635 SARS-COV-2 COVID-19 AMP PRB: CPT | Performed by: EMERGENCY MEDICINE

## 2021-09-04 PROCEDURE — 120N000001 HC R&B MED SURG/OB

## 2021-09-04 PROCEDURE — 81001 URINALYSIS AUTO W/SCOPE: CPT | Performed by: EMERGENCY MEDICINE

## 2021-09-04 RX ORDER — ONDANSETRON 4 MG/1
4 TABLET, ORALLY DISINTEGRATING ORAL EVERY 6 HOURS PRN
Status: DISCONTINUED | OUTPATIENT
Start: 2021-09-04 | End: 2021-09-06 | Stop reason: HOSPADM

## 2021-09-04 RX ORDER — ALBUTEROL SULFATE 0.83 MG/ML
2.5 SOLUTION RESPIRATORY (INHALATION)
Status: DISCONTINUED | OUTPATIENT
Start: 2021-09-04 | End: 2021-09-06 | Stop reason: HOSPADM

## 2021-09-04 RX ORDER — SPIRONOLACTONE 25 MG/1
25 TABLET ORAL DAILY
Status: DISCONTINUED | OUTPATIENT
Start: 2021-09-05 | End: 2021-09-06 | Stop reason: HOSPADM

## 2021-09-04 RX ORDER — ONDANSETRON 2 MG/ML
4 INJECTION INTRAMUSCULAR; INTRAVENOUS EVERY 6 HOURS PRN
Status: DISCONTINUED | OUTPATIENT
Start: 2021-09-04 | End: 2021-09-06 | Stop reason: HOSPADM

## 2021-09-04 RX ORDER — PREDNISONE 20 MG/1
40 TABLET ORAL ONCE
Status: COMPLETED | OUTPATIENT
Start: 2021-09-04 | End: 2021-09-04

## 2021-09-04 RX ORDER — PREDNISONE 20 MG/1
40 TABLET ORAL DAILY
Status: DISCONTINUED | OUTPATIENT
Start: 2021-09-05 | End: 2021-09-06 | Stop reason: HOSPADM

## 2021-09-04 RX ORDER — LIDOCAINE 40 MG/G
CREAM TOPICAL
Status: DISCONTINUED | OUTPATIENT
Start: 2021-09-04 | End: 2021-09-06 | Stop reason: HOSPADM

## 2021-09-04 RX ORDER — IPRATROPIUM BROMIDE AND ALBUTEROL SULFATE 2.5; .5 MG/3ML; MG/3ML
3 SOLUTION RESPIRATORY (INHALATION)
Status: DISCONTINUED | OUTPATIENT
Start: 2021-09-05 | End: 2021-09-06 | Stop reason: HOSPADM

## 2021-09-04 RX ORDER — PREDNISONE 20 MG/1
40 TABLET ORAL ONCE
Status: DISCONTINUED | OUTPATIENT
Start: 2021-09-04 | End: 2021-09-04

## 2021-09-04 RX ORDER — FUROSEMIDE 40 MG
80 TABLET ORAL EVERY MORNING
Status: DISCONTINUED | OUTPATIENT
Start: 2021-09-05 | End: 2021-09-06 | Stop reason: HOSPADM

## 2021-09-04 RX ORDER — ATORVASTATIN CALCIUM 20 MG/1
20 TABLET, FILM COATED ORAL EVERY MORNING
Status: DISCONTINUED | OUTPATIENT
Start: 2021-09-05 | End: 2021-09-06 | Stop reason: HOSPADM

## 2021-09-04 RX ORDER — MONTELUKAST SODIUM 10 MG/1
10 TABLET ORAL EVERY EVENING
Status: DISCONTINUED | OUTPATIENT
Start: 2021-09-04 | End: 2021-09-06 | Stop reason: HOSPADM

## 2021-09-04 RX ADMIN — OMEPRAZOLE 20 MG: 20 CAPSULE, DELAYED RELEASE ORAL at 23:59

## 2021-09-04 RX ADMIN — MONTELUKAST 10 MG: 10 TABLET, FILM COATED ORAL at 23:59

## 2021-09-04 RX ADMIN — PREDNISONE 40 MG: 20 TABLET ORAL at 21:43

## 2021-09-04 ASSESSMENT — ENCOUNTER SYMPTOMS
COUGH: 0
SHORTNESS OF BREATH: 1
CHILLS: 0
WEAKNESS: 1
DIFFICULTY URINATING: 0
DIARRHEA: 0
VOMITING: 0
FATIGUE: 1
DYSURIA: 0
FEVER: 0

## 2021-09-04 ASSESSMENT — MIFFLIN-ST. JEOR: SCORE: 1311.33

## 2021-09-04 NOTE — ED PROVIDER NOTES
History   Chief Complaint:  Generalized Weakness       HPI   Thierry Samuel is a 86 year old male anticoagulated on Coumadin for a-fib with a [employer] pacemaker implanted on 11/27/2012 with a history of CHF and COPD who presents with generalized weakness. Per the patient, two weeks ago he had a checkup done on his pacemaker and some small changes were made. Since then, he has developed fatigue, shortness of breath while exerting himself and laying down, generalized weakness, and cramping in his extremities. He can no longer care for himself at home anymore, cannot do daily activities, and needs a wheelchair to ambulate despite being able to ambulate independently before. He saw his cardiology clinic on the 31st, and his pacemaker was changed to the old settings, but he has not been improving since then. He denies chest pain, changes in his vomiting, diarrhea, difficulty urinating, dysuria, chronic cough, fever, chills, recent diet changes, recent vaccinations, falls, and ill contacts. His weight has been decreasing recently.     Review of Systems   Constitutional: Positive for fatigue. Negative for chills and fever.   Respiratory: Positive for shortness of breath. Negative for cough.    Cardiovascular: Negative for chest pain.   Gastrointestinal: Negative for diarrhea and vomiting.   Genitourinary: Negative for difficulty urinating and dysuria.   Neurological: Positive for weakness.   All other systems reviewed and are negative.        Allergies:  The patient has no known allergies.     Medications:  Albuterol  Lipitor  Furosemide  Montelukast  Omeprazole  Aldactone  Tiotropium  Coumadin    Past Medical History:    A-fib  Cardiac pacemaker in situ  CHF  COPD  GI bleed  Hypertension  Pulmonary hypertension  SUSY    Past Surgical History:    Appendectomy  Unspecified cardiac surgery    Social History:  The patient was accompanied to the ER by a family member.  PCP: Abelardo Wood    Physical Exam      Patient Vitals for the past 24 hrs:   BP Temp Temp src Pulse Resp SpO2   09/04/21 2015 135/70 -- -- 60 -- --   09/04/21 2000 -- -- -- 60 -- (!) 89 %   09/04/21 1945 119/76 -- -- 60 -- (!) 88 %   09/04/21 1930 131/73 -- -- 60 -- 93 %   09/04/21 1915 115/65 -- -- 60 -- 98 %   09/04/21 1900 (!) 146/68 -- -- -- -- --   09/04/21 1725 139/67 98.2  F (36.8  C) Temporal 60 18 96 %       Physical Exam     HENT:    Mouth/Throat: Oropharynx is without swelling or erythema. Oral mucosa moist.    Eyes: Conjunctivae are normal. No scleral icterus.  Neck: Neck supple. No cervical adenopathy  Cardiovascular: Normal rate, regular rhythm and intact distal pulses.    Pulmonary/Chest: Diminished breath sounds and rales at the bases bilaterally.  Abdominal: Soft.  No distension. There is no tenderness.   Musculoskeletal:  No edema, No calf tenderness  Neurological:Alert. Coordination normal.   Skin: Skin is warm and dry.   Psychiatric: Normal mood and affect.     Emergency Department Course   ECG  ECG taken at 1801, ECG read at 1916  Ventricular-paced rhythm  Abnormal ECG   Rate 60 bpm. CA interval * ms. QRS duration 166 ms. QT/QTc 468/468 ms. P-R-T axes * -67 83.     Imaging:    XR Chest Port 1 View  Stable single lead right-sided cardiac conduction device. Low lung volumes. Similar appearance of mild patchy bibasilar pulmonary opacities which may reflect atelectasis or mild infiltrates. No definite pleural effusion. Stable cardiomegaly.   No overt vascular congestion or pulmonary edema.  Reading per radiology.    The above imaging workup was performed.     Laboratory:    CBC: WBC 7.1, HGB 14.0,    BMP: Anion Gap 1(L) o/w WNL (Creatinine 0.96)   INR: 4.43(H)  Troponin (Collected 1933): <0.015  BNP: 812  Hepatic panel: Bili Direct 0.1, Bili Total 0.5, Albumin 3.2(L), Protein Total 7.1, Alkphos 104, ALT 19, AST 23.  CK Total: 70    UA with micro: WNL    Asymptomatic COVID19 Virus PCR by nasopharyngeal swab:  Negative    Emergency Department Course:    Reviewed:  I reviewed nursing notes, vitals and past medical history    Assessments:  1906 I obtained history and examined the patient as noted above.   2105 I spoke to the patient's daughter. The patient normally drops in oxygen saturation while he is sleeping and normally wears a CPAP, and his daughter requests a CPAP for him.  2124 I rechecked the patient and explained findings. The patient is now wheezing.     Consults:   2100 I spoke with Dr. Neely of the hospitalist service from [facility] regarding patient's presentation, findings, and plan of care.    Interventions:  2143: Prednisone, 40 mg, Oral    Disposition:  The patient was admitted to the hospital under the care of Dr. Neely.       Impression & Plan         Medical Decision Making:  Thierry Samuel is a 86 year old male presents with weakness, muscle aches, and dyspnea in the setting of having attempted outpatient treatment by Primary care and cardiologist over the last few weeks brought to the emergency department today as is daughter stated the weakness was affecting her ability to care for him at home.  Patient was hemodynamically stable with normal oxygen saturations.  EKG was without acute ischemic changes.  There was some delay in obtaining lab results but ultimately noted a normal BNP and troponin.  Remainder of labs were baseline for him and nondiagnostic.  As he is failed outpatient management and this is a significant change in his ability to function at home over the last 2 weeks feel that admission for ongoing evaluation and management is indicated.  Dr. Neely is excepted the patient for admission.  At the time of his evaluation he was noted to be wheezing and therefore we added steroid.    Diagnosis:    ICD-10-CM    1. Weakness generalized  R53.1    2. Muscle pain  M79.10    3. COPD exacerbation (H)  J44.1    4. Dyspnea, unspecified type  R06.00        Scribe Disclosure:  Ric SUN  Javed, am serving as a scribe at 6:38 PM on 9/4/2021 to document services personally performed by Jaz Cardenas MD based on my observations and the provider's statements to me.        Jaz Cardenas MD  09/04/21 2615

## 2021-09-04 NOTE — ED TRIAGE NOTES
Recent pacemaker changes. Current settings leaving patient very fatigued. Difficulty doing ADLs due to lack of energy.

## 2021-09-04 NOTE — TELEPHONE ENCOUNTER
Telephone call:  This patient's daughter calls and wants to discuss his current situation.  She states that the patient is fatigued.  He has had multiple outpatient visits for fatigue, shortness of breath, heart failure, COPD, leg cramping.    He is recently had changes to his pacemaker settings.  It was originally put on VVIR and attempt to improve his activity tolerance.  However this made him worse and he was changed to VVI.    Essentially VVIR was an attempt to give him increased heart rate with activity.  He did not tolerate this and now he is back on his previous setting.  I do not think that his pacemaker setting change has much to do with his symptoms.    The daughter has multiple complaints on behalf of the patient including shortness of breath, fatigue, failure to thrive type symptoms, arm weakness, leg cramping among other complaints.    Overall things are difficult to manage over the phone as I have never met the patient and cannot examine him or talk to them.    However after chart review I suspect the patient may have cramping in the legs related to statin therapy or derangements in electrolytes.    The patient also may be dehydrated as he has lost a few pounds.    Almost assuredly the patient he has multifactorial dyspnea with severe COPD that requires oxygen, emphysema on chest CT, heart failure preserved ejection fraction, atrial fibrillation with atrial enlargement which most likely is associated with increased left atrial and/or LV pressures, mild to moderate pulmonary hypertension, deconditioning.  These are only the  factors that are available through chart review.    I advised that the daughter bring the patient to the hospital for emergency room evaluation if he indeed is decompensating.

## 2021-09-05 LAB
ANION GAP SERPL CALCULATED.3IONS-SCNC: 5 MMOL/L (ref 3–14)
BUN SERPL-MCNC: 15 MG/DL (ref 7–30)
CALCIUM SERPL-MCNC: 8.4 MG/DL (ref 8.5–10.1)
CHLORIDE BLD-SCNC: 106 MMOL/L (ref 94–109)
CO2 SERPL-SCNC: 24 MMOL/L (ref 20–32)
CREAT SERPL-MCNC: 0.65 MG/DL (ref 0.66–1.25)
ERYTHROCYTE [DISTWIDTH] IN BLOOD BY AUTOMATED COUNT: 13.2 % (ref 10–15)
GFR SERPL CREATININE-BSD FRML MDRD: 89 ML/MIN/1.73M2
GLUCOSE BLD-MCNC: 138 MG/DL (ref 70–99)
HCT VFR BLD AUTO: 40.6 % (ref 40–53)
HGB BLD-MCNC: 13.1 G/DL (ref 13.3–17.7)
INR PPP: 3.45 (ref 0.85–1.15)
MCH RBC QN AUTO: 30.6 PG (ref 26.5–33)
MCHC RBC AUTO-ENTMCNC: 32.3 G/DL (ref 31.5–36.5)
MCV RBC AUTO: 95 FL (ref 78–100)
PLATELET # BLD AUTO: 197 10E3/UL (ref 150–450)
POTASSIUM BLD-SCNC: 4.7 MMOL/L (ref 3.4–5.3)
PROCALCITONIN SERPL-MCNC: <0.05 NG/ML
RBC # BLD AUTO: 4.28 10E6/UL (ref 4.4–5.9)
SODIUM SERPL-SCNC: 135 MMOL/L (ref 133–144)
WBC # BLD AUTO: 5 10E3/UL (ref 4–11)

## 2021-09-05 PROCEDURE — 120N000001 HC R&B MED SURG/OB

## 2021-09-05 PROCEDURE — 250N000009 HC RX 250: Performed by: HOSPITALIST

## 2021-09-05 PROCEDURE — 250N000012 HC RX MED GY IP 250 OP 636 PS 637: Performed by: HOSPITALIST

## 2021-09-05 PROCEDURE — 36415 COLL VENOUS BLD VENIPUNCTURE: CPT | Performed by: HOSPITALIST

## 2021-09-05 PROCEDURE — 94640 AIRWAY INHALATION TREATMENT: CPT | Mod: 76

## 2021-09-05 PROCEDURE — 99233 SBSQ HOSP IP/OBS HIGH 50: CPT | Performed by: INTERNAL MEDICINE

## 2021-09-05 PROCEDURE — 999N000157 HC STATISTIC RCP TIME EA 10 MIN

## 2021-09-05 PROCEDURE — 250N000011 HC RX IP 250 OP 636: Performed by: INTERNAL MEDICINE

## 2021-09-05 PROCEDURE — 85610 PROTHROMBIN TIME: CPT | Performed by: HOSPITALIST

## 2021-09-05 PROCEDURE — 258N000003 HC RX IP 258 OP 636: Performed by: INTERNAL MEDICINE

## 2021-09-05 PROCEDURE — 84145 PROCALCITONIN (PCT): CPT | Performed by: INTERNAL MEDICINE

## 2021-09-05 PROCEDURE — 80048 BASIC METABOLIC PNL TOTAL CA: CPT | Performed by: HOSPITALIST

## 2021-09-05 PROCEDURE — 36415 COLL VENOUS BLD VENIPUNCTURE: CPT | Performed by: INTERNAL MEDICINE

## 2021-09-05 PROCEDURE — 94640 AIRWAY INHALATION TREATMENT: CPT

## 2021-09-05 PROCEDURE — 85027 COMPLETE CBC AUTOMATED: CPT | Performed by: HOSPITALIST

## 2021-09-05 PROCEDURE — 250N000013 HC RX MED GY IP 250 OP 250 PS 637: Performed by: HOSPITALIST

## 2021-09-05 RX ORDER — AZITHROMYCIN 250 MG/1
250 TABLET, FILM COATED ORAL DAILY
Status: DISCONTINUED | OUTPATIENT
Start: 2021-09-06 | End: 2021-09-06 | Stop reason: HOSPADM

## 2021-09-05 RX ORDER — WARFARIN SODIUM 1 MG/1
1 TABLET ORAL
Status: COMPLETED | OUTPATIENT
Start: 2021-09-05 | End: 2021-09-05

## 2021-09-05 RX ORDER — AZITHROMYCIN 500 MG/5ML
500 INJECTION, POWDER, LYOPHILIZED, FOR SOLUTION INTRAVENOUS ONCE
Status: COMPLETED | OUTPATIENT
Start: 2021-09-05 | End: 2021-09-05

## 2021-09-05 RX ADMIN — IPRATROPIUM BROMIDE AND ALBUTEROL SULFATE 3 ML: .5; 3 SOLUTION RESPIRATORY (INHALATION) at 16:32

## 2021-09-05 RX ADMIN — FUROSEMIDE 80 MG: 40 TABLET ORAL at 09:56

## 2021-09-05 RX ADMIN — PREDNISONE 40 MG: 20 TABLET ORAL at 09:57

## 2021-09-05 RX ADMIN — SPIRONOLACTONE 25 MG: 25 TABLET ORAL at 09:57

## 2021-09-05 RX ADMIN — WARFARIN SODIUM 1 MG: 1 TABLET ORAL at 17:22

## 2021-09-05 RX ADMIN — OMEPRAZOLE 20 MG: 20 CAPSULE, DELAYED RELEASE ORAL at 09:56

## 2021-09-05 RX ADMIN — MONTELUKAST 10 MG: 10 TABLET, FILM COATED ORAL at 21:28

## 2021-09-05 RX ADMIN — IPRATROPIUM BROMIDE AND ALBUTEROL SULFATE 3 ML: .5; 3 SOLUTION RESPIRATORY (INHALATION) at 19:54

## 2021-09-05 RX ADMIN — OMEPRAZOLE 20 MG: 20 CAPSULE, DELAYED RELEASE ORAL at 21:28

## 2021-09-05 RX ADMIN — IPRATROPIUM BROMIDE AND ALBUTEROL SULFATE 3 ML: .5; 3 SOLUTION RESPIRATORY (INHALATION) at 12:05

## 2021-09-05 RX ADMIN — ATORVASTATIN CALCIUM 20 MG: 20 TABLET, FILM COATED ORAL at 09:57

## 2021-09-05 RX ADMIN — IPRATROPIUM BROMIDE AND ALBUTEROL SULFATE 3 ML: .5; 3 SOLUTION RESPIRATORY (INHALATION) at 09:22

## 2021-09-05 RX ADMIN — AZITHROMYCIN MONOHYDRATE 500 MG: 500 INJECTION, POWDER, LYOPHILIZED, FOR SOLUTION INTRAVENOUS at 12:55

## 2021-09-05 ASSESSMENT — ACTIVITIES OF DAILY LIVING (ADL)
ADLS_ACUITY_SCORE: 18
DEPENDENT_IADLS:: INDEPENDENT
ADLS_ACUITY_SCORE: 18

## 2021-09-05 NOTE — PROVIDER NOTIFICATION
09/04/21 2323   Oxygen Therapy   SpO2 96 %   O2 Device BiPAP/CPAP   FiO2 (%) 21 %     Home NPPV checked and functioning correclty.

## 2021-09-05 NOTE — PHARMACY-ADMISSION MEDICATION HISTORY
Admission medication history interview status for this patient is complete. See [facility] admission navigator for allergy information, prior to admission medications and immunization status.     Medication history interview done, indicate source(s): Patient & patient's daughter (manages meds / interprets)  Medication history resources (including written lists, pill bottles, clinic record): none, daughter had pill bottles with  Pharmacy: [place] meds from outside country    Changes made to PTA medication list:  Added: none  Changed: Warfarin dose  Reported as Not Taking: none  Removed: none    Actions taken by pharmacist (provider contacted, etc):None     Additional medication history information:None    Medication reconciliation/reorder completed by provider prior to medication history?  N   (Y/N)         Prior to Admission medications    Medication Sig Last Dose Taking? Auth Provider   ALBUTEROL IN Inhale 6 puffs into the lungs every 8 hours 6 puffs q8h scheduled (Butovent/Salbutamol) 9/4/2021 at Unknown time Yes Unknown, Entered By History   atorvastatin (LIPITOR) 20 MG tablet Take 20 mg by mouth every morning  9/4/2021 at am Yes Reported, Patient   carbamide peroxide (DEBROX) 6.5 % otic solution Place 5 drops into the right ear daily as needed for other (wax) 9/4/2021 at am Yes Demarco Diaz DO   furosemide (LASIX) 80 MG tablet Take 80 mg by mouth every morning 9/4/2021 at am Yes Unknown, Entered By History   hypromellose-dextran (ARTIFICAL TEARS) 0.1-0.3 % SOLN ophthalmic solution Apply 2-3 drops to eye every hour as needed for dry eyes Past Week at Unknown time Yes Dane Leon MD   ipratropium - albuterol 0.5 mg/2.5 mg/3 mL (DUONEB) 0.5-2.5 (3) MG/3ML neb solution Take 1 vial by nebulization every 6 hours as needed for shortness of breath / dyspnea or wheezing 9/4/2021 at Unknown time Yes Unknown, Entered By History   montelukast (SINGULAIR) 10 MG tablet Take 10 mg by mouth every evening 9/3/2021 at  pm Yes Unknown, Entered By History   omeprazole (PRILOSEC) 20 MG DR capsule Take 20 mg by mouth 2 times daily 9/4/2021 at am Yes Unknown, Entered By History   spironolactone (ALDACTONE) 25 MG tablet Take 1 tablet (25 mg) by mouth daily 9/4/2021 at am Yes Yo Rod MD   tiotropium (SPIRIVA) 18 MCG inhalation capsule Inhale 18 mcg into the lungs daily 9/4/2021 at am Yes Reported, Patient   UNABLE TO FIND Inhale 2 puffs into the lungs 2 times daily MEDICATION NAME: Serflu' (salmeterol xinafoate 25 mcg fluticasone propionate 250 mcg)  Inhaler 2 puffs bid. 9/4/2021 at am Yes Unknown, Entered By History   warfarin ANTICOAGULANT (COUMADIN) 2.5 MG tablet Take by mouth every morning 2.5 mg Monday & Wednesday, 5 mg all other days 9/4/2021 at am Yes Unknown, Entered By History

## 2021-09-05 NOTE — H&P
[facility]    History and Physical  Hospitalist       Date of Admission:  9/4/2021    Assessment & Plan   Thierry Samuel is a 86 year old Bruneian-speaking male with a past medical history of severe COPD on 3 L via nasal cannula as needed, SUSY using CPAP, severe pulmonary hypertension, chronic heart failure with preserved ejection fraction, A. fib on warfarin status post pacemaker placement, hypertension, hyperlipidemia who presents with generalized weakness and SOB.     Mr. Ivan Samuel was hospitalized here at [facility] from 8/10-8/12 for acute on chronic hypoxemic respiratory failure secondary to COPD exacerbation, heart failure exacerbation and possible pneumonia. He was treated with prednisone taper and doxycycline. His prednisone taper ended on 8/24.    #SOB, generalized weakness.  Suspect COPD exacerbation: Patient notes he felt good immediately after discharge from the hospital. Patient was seen by cardiology about 2 weeks ago.  Spironolactone was also added to his regimen.  He also established with pacemaker clinic with some adjustments made to pacemaker on 8/25.  At baseline, patient was previously gardening and independent. After his pacemaker clinic appointment on 08/25 he has noted generalized weakness and fatigue. He has noted shortness of breath and cough productive of sputum. Shortness of breath is particularly worse when he is exerting himself. He does not have any worsening PND or orthopnea.   -ED, patient afebrile and nontachycardic.  Normotensive.  Saturating well on room air.  BMP unremarkable.  CBC unremarkable.  INR supratherapeutic at 4.43.  UA unremarkable.  Chest x-ray shows similar appearance of patchy bibasilar opacities likely reflecting atelectasis or mild infiltrate.  Stable cardiomegaly.  There was no overt vascular congestion or pulmonary edema.  BNP is in process.  Troponin negative. EKG shows V paced rhythm.  -On exam, patient has bilateral expiratory  wheeze with prolonged expiratory phase. I suspect patient's shortness of breath and generalized weakness is secondary to COPD exacerbation in the setting of his prednisone taper ending on 8/24. He endorses shortness of breath with exertion and increased productive cough. Chest x-ray without evidence of clear fluid overload and weight trending down over last 2 weeks. No new infiltrate, fevers, white count.  -Prednisone 40 mg daily with likely longer taper, scheduled duonebs, albuterol prn.  Continue home spiriva.      #Afib on a/c s/p PPM: Continue warfarin. Pharm to dose. Can skip dose tonight as supratherapeutic.  Telemetry. Pending clinical course, if still significant fatigue then have cardiology evaluate pacemaker settings.   #Chronic HFpEF: Weight has actually been trending down.  He is followed by cardiology team.  Will continue home lasix and spironolactone. Daily weights and I/Os.    #HL: Continue statin  #SUSY: Continue home CPAP with 2L O2 bleed.     DVT Prophylaxis: Warfarin  Code Status: Full Code. Confirmed on admission  Dispo: Admit to inpatient. Expect greater than 2 midnights.     Watson Neely MD    Primary Care Physician   [facility]    Chief Complaint   Weakness and SOB    History is obtained from the patient, patient's chart discussed with ER physician    Patient was seen with daughter acting as  per patient preference. Offered iPad  but declined.    History of Present Illness   Thierry Samuel is a 86 year old Urdu-speaking male with a past medical history of severe COPD on 3 L via nasal cannula as needed, SUSY using CPAP, severe pulmonary hypertension, chronic heart failure with preserved ejection fraction, A. fib on warfarin status post pacemaker placement, hypertension, hyperlipidemia and recent hospitalization for acute on chronic hypoxemic respiratory failure secondary to heart failure exacerbation, COPD and possible pneumonia who presents with  generalized weakness.    Mr. Ivan Samuel was hospitalized here at [facility] from 8/10-8/12 for acute on chronic hypoxemic respiratory failure secondary to COPD exacerbation, heart failure exacerbation and possible pneumonia. He was treated with prednisone taper and doxycycline. His prednisone taper ended on 8/24.    Patient was seen by cardiology about 2 weeks ago.  Spironolactone was also added to his regimen.  He also established with pacemaker clinic with some adjustments made to pacemaker on 8/25.  At baseline, patient was previously gardening and independent. After his pacemaker clinic appointment he has noted generalized weakness and fatigue. He has noted shortness of breath and cough productive of sputum. Shortness of breath is particularly worse when he is exerting himself. He does not have any worsening PND or orthopnea. He notes some generalized weakness and cramping in his lower extremities as well.  He is having difficulty caring for himself at home. His weight has actually been trending down over the last 2 weeks or so with addition of spironolactone. He denies any chest pain, fevers or chills, nausea vomiting, any abdominal pain. Lower extremity edema is actually much better. He denies any dysuria. No skin changes.    In the ED, patient afebrile and nontachycardic.  Normotensive.  Saturating well on room air.  BMP unremarkable.  CBC unremarkable.  INR supratherapeutic at 4.43.  UA unremarkable.  Chest x-ray shows similar appearance of patchy bibasilar opacities likely reflecting atelectasis or mild infiltrate.  Stable cardiomegaly.  There was no overt vascular congestion or pulmonary edema.  BNP is in process.  EKG shows V paced rhythm.    Past Medical History    I have reviewed this patient's medical history and updated it with pertinent information if needed.   Past Medical History:   Diagnosis Date     A-fib (H)     s/p pacemaker     Cardiac pacemaker in situ      CHF (congestive heart failure) (H)  09/27/2018     COPD (chronic obstructive pulmonary disease) (H)      GI bleed      Hypertension      Long term current use of anticoagulant therapy      Moderate to severe pulmonary hypertension (H)      SUSY (obstructive sleep apnea)      SUSY (obstructive sleep apnea)        Past Surgical History   I have reviewed this patient's surgical history and updated it with pertinent information if needed.  Past Surgical History:   Procedure Laterality Date     APPENDECTOMY       CARDIAC SURGERY         Prior to Admission Medications   Prior to Admission Medications   Prescriptions Last Dose Informant Patient Reported? Taking?   ALBUTEROL IN   Yes No   Sig: Inhale 6 puffs into the lungs every 8 hours 6 puffs q8h scheduled (Butovent/Salbutamol)   Atorvastatin Calcium (LIPITOR PO)   Yes No   Sig: Take 20 mg by mouth every morning    UNABLE TO FIND   Yes No   Sig: Inhale 2 puffs into the lungs 2 times daily MEDICATION NAME: Serflu' (salmeterol xinafoate 25 mcg fluticasone propionate 250 mcg)  Inhaler 2 puffs bid.   carbamide peroxide (DEBROX) 6.5 % otic solution   No No   Sig: Place 5 drops into the right ear daily as needed for other (wax)   furosemide (LASIX) 80 MG tablet   Yes No   Sig: Take 80 mg by mouth every morning   hypromellose-dextran (ARTIFICAL TEARS) 0.1-0.3 % SOLN ophthalmic solution   No No   Sig: Apply 2-3 drops to eye every hour as needed for dry eyes   ipratropium - albuterol 0.5 mg/2.5 mg/3 mL (DUONEB) 0.5-2.5 (3) MG/3ML neb solution  Daughter Yes No   Sig: Take 1 vial by nebulization every 6 hours as needed for shortness of breath / dyspnea or wheezing   montelukast (SINGULAIR) 10 MG tablet  Daughter Yes No   Sig: Take 10 mg by mouth every evening   omeprazole (PRILOSEC) 20 MG DR capsule   Yes No   Sig: Take 20 mg by mouth 2 times daily   spironolactone (ALDACTONE) 25 MG tablet   No No   Sig: Take 1 tablet (25 mg) by mouth daily   tiotropium (SPIRIVA) 18 MCG inhalation capsule   Yes No   Sig: Inhale 18 mcg  into the lungs daily   warfarin ANTICOAGULANT (COUMADIN) 2.5 MG tablet  Daughter Yes No   Sig: Take by mouth every morning Per 8/10/21: 2.5 mg Monday, 5 mg all other days      Facility-Administered Medications: None     Allergies   No Known Allergies    Social History   I have reviewed this patient's social history and updated it with pertinent information if needed. Thierry Samuel  reports that he has quit smoking. His smoking use included cigarettes. He has never used smokeless tobacco. He reports current alcohol use. He reports that he does not use drugs.    Family History   I have reviewed this patient's family history and updated it with pertinent information if needed.   Family History   Problem Relation Age of Onset     No Known Problems Mother      No Known Problems Father        Review of Systems   The 10 point Review of Systems is negative other than noted in the HPI or here.     Physical Exam   Temp: 98.2  F (36.8  C) Temp src: Temporal BP: 135/70 Pulse: 60   Resp: 18 SpO2: (!) 89 %      Vital Signs with Ranges  Temp:  [98.2  F (36.8  C)] 98.2  F (36.8  C)  Pulse:  [60] 60  Resp:  [18] 18  BP: (115-146)/(65-76) 135/70  SpO2:  [88 %-98 %] 89 %  0 lbs 0 oz    Constitutional: Elderly male, Answers through daughter as .   HEENT: Normocephalic, MMM, no elevation of JVD noted  Respiratory: Mild tachypnea, Nl WOB, no crackles. He has bilateral expiratory wheeze noted. Prolonged expiratory phase noted.  Cardiovascular: Regular, no murmur  GI: Mild distension. BS+, NT, ND, no rebound or guarding  Lymph/Hematologic: No bruising. No cervical LAD  Skin: Venous stasis changes noted.  Musculoskeletal: Nl Tone, mild-moderate edema noted. Daughter notes this is much better than what it was 2 weeks ago.  Neurologic: A&Ox3, Answers appropriately. CNII-XII intact. Moves all extremities. No tremor  Psychiatric: Calm    Data   Data reviewed today:  I personally reviewed  Recent Labs   Lab 09/04/21 1933  09/03/21  1251   WBC 7.1 8.6   HGB 14.0 14.0   MCV 98 94    183   INR 4.43*  --     133   POTASSIUM 4.8 4.4   CHLORIDE 105 103   CO2 30 25   BUN 20 15   CR 0.96 0.89   ANIONGAP 1* 5   CAMPOS 8.7 8.6   GLC 83 136*   ALBUMIN 3.2*  --    PROTTOTAL 7.1  --    BILITOTAL 0.5  --    ALKPHOS 104  --    ALT 23  --    AST 19  --    TROPONIN  --  <0.015       Recent Results (from the past 24 hour(s))   XR Chest Port 1 View    Narrative    EXAM: XR CHEST PORT 1 VIEW  LOCATION: [facility]  DATE/TIME: 9/4/2021 7:37 PM    INDICATION: weakness, shortness of breath  COMPARISON: Chest x-ray 08/10/2021      Impression    IMPRESSION: Stable single lead right-sided cardiac conduction device. Low lung volumes. Similar appearance of mild patchy bibasilar pulmonary opacities which may reflect atelectasis or mild infiltrates. No definite pleural effusion. Stable cardiomegaly.   No overt vascular congestion or pulmonary edema.       Clinically Significant Risk Factors Present on Admission             # Coagulation Defect: home medication list includes an anticoagulant medication

## 2021-09-05 NOTE — PHARMACY-ANTICOAGULATION SERVICE
Clinical Pharmacy - Warfarin Dosing Consult     Pharmacy has been consulted to manage this patient s warfarin therapy.  Indication: Atrial Fibrillation  Therapy Goal: INR 2-3  Warfarin Prior to Admission: Yes  Warfarin PTA Regimen: Warfarin PTA 2.5mg MW & 5mg AOD    INR   Date Value Ref Range Status   09/04/2021 4.43 (H) 0.85 - 1.15 Final     Comment:     Effective 7/11/2021, the reference range for this assay has changed.   08/12/2021 3.55 (H) 0.85 - 1.15 Final     Comment:     Effective 7/11/2021, the reference range for this assay has changed.       Dose taken this morning, elevated INR. No dose tonight. Pharmacy will monitor Thierry Teliz Lizzie daily and order warfarin doses to achieve specified goal.      Please contact pharmacy as soon as possible if the warfarin needs to be held for a procedure or if the warfarin goals change.      Wendy Farah, PharmD

## 2021-09-05 NOTE — PROGRESS NOTES
Pt arrived on the floor around midnight accompanied with daughter. Pt is Faroese speaking, daughter is helping with interpretation also ipad is ion the room. Pt is alert and oriented, lung sounds diminished/ exp. Wheezy.On 2 L of O2 via cpap.Cardiac diet, passing gas,md. BM.On K protocol recheck in the am. Voiding adequate amounts.

## 2021-09-05 NOTE — ED NOTES
[facility]  ED Nurse Handoff Report    Thierry Samuel is a 86 year old male   ED Chief complaint: Generalized Weakness  . ED Diagnosis:   Final diagnoses:   Weakness generalized   Muscle pain   COPD exacerbation (H)   Dyspnea, unspecified type     Allergies: No Known Allergies    Code Status: Full Code  Activity level - Baseline/Home:  Independent. Activity Level - Current:   Assist X 1. Lift room needed: No. Bariatric: No   Needed: No   Isolation: No. Infection: Not Applicable.     Vital Signs:   Vitals:    09/04/21 2015 09/04/21 2030 09/04/21 2045 09/04/21 2100   BP: 135/70 96/63 127/70 120/81   Pulse: 60 60 60 60   Resp:       Temp:       TempSrc:       SpO2:  98% 97% 97%       Cardiac Rhythm:  ,      Pain level:    Patient confused: No. Patient Falls Risk: Yes.   Elimination Status: Has voided   Patient Report - Initial Complaint: fatigue and weakness. Focused Assessment: fatigue, weakness and dropping sats with activity  Tests Performed:   XR Chest Port 1 View   Final Result   IMPRESSION: Stable single lead right-sided cardiac conduction device. Low lung volumes. Similar appearance of mild patchy bibasilar pulmonary opacities which may reflect atelectasis or mild infiltrates. No definite pleural effusion. Stable cardiomegaly.    No overt vascular congestion or pulmonary edema.        . Abnormal Results:   Labs Ordered and Resulted from Time of ED Arrival Up to the Time of Departure from the ED   BASIC METABOLIC PANEL - Abnormal; Notable for the following components:       Result Value    Anion Gap 1 (*)     All other components within normal limits   INR - Abnormal; Notable for the following components:    INR 4.43 (*)     All other components within normal limits   HEPATIC FUNCTION PANEL - Abnormal; Notable for the following components:    Albumin 3.2 (*)     All other components within normal limits   CBC WITH PLATELETS AND DIFFERENTIAL - Abnormal; Notable for the following  components:    RBC Count 4.38 (*)     All other components within normal limits   TROPONIN I - Normal   CK TOTAL - Normal   ROUTINE UA WITH MICROSCOPIC REFLEX TO CULTURE - Normal    Narrative:     Urine Culture not indicated   COVID-19 VIRUS (CORONAVIRUS) BY PCR - Normal    Narrative:     Testing was performed using the shiela  SARS-CoV-2 & Influenza A/B Assay on the shiela  Sera  System.  This test should be ordered for the detection of SARS-COV-2 in individuals who meet SARS-CoV-2 clinical and/or epidemiological criteria. Test performance is unknown in asymptomatic patients.  This test is for in vitro diagnostic use under the FDA EUA for laboratories certified under CLIA to perform moderate and/or high complexity testing. This test has not been FDA cleared or approved.  A negative test does not rule out the presence of PCR inhibitors in the specimen or target RNA in concentration below the limit of detection for the assay. The possibility of a false negative should be considered if the patient's recent exposure or clinical presentation suggests COVID-19.  [facility] are certified under the Clinical Laboratory Improvement Amendments of 1988 (CLIA-88) as qualified to perform moderate and/or high complexity laboratory testing.   CBC WITH PLATELETS & DIFFERENTIAL    Narrative:     The following orders were created for panel order CBC + differential.  Procedure                               Abnormality         Status                     ---------                               -----------         ------                     CBC with platelets and d...[[id number]]  Abnormal            Final result                 Please view results for these tests on the individual orders.   EXTRA RED TOP TUBE   EXTRA GREEN TOP (LITHIUM HEPARIN) TUBE   EXTRA PURPLE TOP TUBE   NT PROBNP INPATIENT   TROPONIN I   EXTRA TUBE    Narrative:     The following orders were created for panel order Extra Tube ([place]  Draw).  Procedure                               Abnormality         Status                     ---------                               -----------         ------                     Extra Red Top Tube[[id number]]                               Final result               Extra Green Top (Lithium...[[id number]]                      Final result               Extra Purple Top Tube[[id number]]                            Final result                 Please view results for these tests on the individual orders.     .   Treatments provided: see EMAR  Family Comments: N/A. Daughter has been interprating  OBS brochure/video discussed/provided to patient:  Yes  ED Medications:   Medications   predniSONE (DELTASONE) tablet 40 mg (has no administration in time range)     Drips infusing:  No  For the majority of the shift, the patient's behavior Green. Interventions performed were n/a.    Sepsis treatment initiated: No     Patient tested for COVID 19 prior to admission: YES    ED Nurse Name/Phone Number: Lester Johnson RN,   9:31 PM  RECEIVING UNIT ED HANDOFF REVIEW    Above ED Nurse Handoff Report was reviewed: Yes  Reviewed by: Herminio Mariscal RN on September 4, 2021 at 10:15 PM

## 2021-09-05 NOTE — ED NOTES
Pt drops sats to 96% on any little activity such as getting up to bedside bed and moving up in bed.

## 2021-09-05 NOTE — PLAN OF CARE
Pt A&Ox4. Latvian speaking. Daughter at bedside to interpret. Up with SBA. Denied pain. Reported that he is feeling a lot better today. Lungs with some expiratory wheeze productive cough. Encouraging use of IS. Received Zithromax IV X1/ Tolerating diet. Voiding in small amounts. Will bladder scan to check for retention. Given Zithromax IV X1.  Plan is discharge to home in 1-2 days.

## 2021-09-05 NOTE — CONSULTS
Care Management Initial Consult    General Information  Assessment completed with: Patient, Children,    Type of CM/SW Visit: Initial Assessment    Primary Care Provider verified and updated as needed: Yes   Readmission within the last 30 days: previous discharge plan unsuccessful   Return Category: Exacerbation of disease     Advance Care Planning:            Communication Assessment  Patient's communication style: spoken language (non-English)    Hearing Difficulty or Deaf: no   Wear Glasses or Blind: no    Cognitive  Cognitive/Neuro/Behavioral: WDL                      Living Environment:   People in home: child(fareed), adult     Current living Arrangements: house      Able to return to prior arrangements: yes       Family/Social Support:  Care provided by: self  Provides care for:    Marital Status:   Wife, Children          Description of Support System: Supportive, Involved    Support Assessment: Adequate family and caregiver support    Current Resources:   Patient receiving home care services: No     Community Resources: None  Equipment currently used at home: cane, straight, wheelchair, manual  Supplies currently used at home: Oxygen Tubing/Supplies    Employment/Financial:  Employment Status: retired        Financial Concerns: insurance, none   Referral to Financial Counselor: Yes       Lifestyle & Psychosocial Needs:  Social Determinants of Health     Tobacco Use: Medium Risk     Smoking Tobacco Use: Former Smoker     Smokeless Tobacco Use: Never Used   Alcohol Use:      Frequency of Alcohol Consumption:      Average Number of Drinks:      Frequency of Binge Drinking:    Financial Resource Strain:      Difficulty of Paying Living Expenses:    Food Insecurity:      Worried About Running Out of Food in the Last Year:      Ran Out of Food in the Last Year:    Transportation Needs:      Lack of Transportation (Medical):      Lack of Transportation (Non-Medical):    Physical Activity:      Days of Exercise  per Week:      Minutes of Exercise per Session:    Stress:      Feeling of Stress :    Social Connections:      Frequency of Communication with Friends and Family:      Frequency of Social Gatherings with Friends and Family:      Attends Islam Services:      Active Member of Clubs or Organizations:      Attends Club or Organization Meetings:      Marital Status:    Intimate Partner Violence:      Fear of Current or Ex-Partner:      Emotionally Abused:      Physically Abused:      Sexually Abused:    Depression:      PHQ-2 Score:    Housing Stability:      Unable to Pay for Housing in the Last Year:      Number of Places Lived in the Last Year:      Unstable Housing in the Last Year:        Functional Status:  Prior to admission patient needed assistance:   Dependent ADLs:: Independent  Dependent IADLs:: Independent       Mental Health Status:  Mental Health Status: No Current Concerns       Chemical Dependency Status:  Chemical Dependency Status: No Current Concerns             Values/Beliefs:  Spiritual, Cultural Beliefs, Islam Practices, Values that affect care: no               Additional Information:  Pt identified as high risk for readmission.  He was admitted for shortness of breath and weakness-suspect COPD exacerbation.  Pt was at [facility] from 8/10-8/12 for COPD exacerbation. Met with pt and his dtg Virginia at bedside.  Pt was sitting in chair eating lunch.  Pt declined using iPad , dtg interpreted.      Pt lives with his dtg. At baseline ambulates with a cane, he does have wheelchair if he is going long distances.  He uses oxygen (supplied by [facility]) at night with his CPAP.  Pt does do daily weights, follows a low sodium diet and is trying to increase vegetables in his diet.      Checking insurance,  notes indicated pt does not have coverage [facility].  Virginia provided a copy of his [place] [facility] card.  PHILLIP provided registration with a copy of the card.  Pt  currently follow with the [facility] for his care. Pt does not have any professional and community services  Provided pt with [facility].    Dtg will provide transportation home at discharge.  No needs identified.    Meeta Mehta RN, BSN, PHN, [facility]  Care Coordinator  [facility]  [phone]

## 2021-09-06 VITALS
RESPIRATION RATE: 20 BRPM | HEIGHT: 60 IN | OXYGEN SATURATION: 96 % | WEIGHT: 173.7 LBS | BODY MASS INDEX: 34.1 KG/M2 | TEMPERATURE: 97.7 F | HEART RATE: 62 BPM | DIASTOLIC BLOOD PRESSURE: 63 MMHG | SYSTOLIC BLOOD PRESSURE: 128 MMHG

## 2021-09-06 LAB
ATRIAL RATE - MUSE: 375 BPM
DIASTOLIC BLOOD PRESSURE - MUSE: NORMAL MMHG
INR PPP: 3.45 (ref 0.85–1.15)
INTERPRETATION ECG - MUSE: NORMAL
P AXIS - MUSE: NORMAL DEGREES
POTASSIUM BLD-SCNC: 4.5 MMOL/L (ref 3.4–5.3)
PR INTERVAL - MUSE: NORMAL MS
QRS DURATION - MUSE: 166 MS
QT - MUSE: 468 MS
QTC - MUSE: 468 MS
R AXIS - MUSE: -67 DEGREES
SYSTOLIC BLOOD PRESSURE - MUSE: NORMAL MMHG
T AXIS - MUSE: 83 DEGREES
VENTRICULAR RATE- MUSE: 60 BPM

## 2021-09-06 PROCEDURE — 250N000013 HC RX MED GY IP 250 OP 250 PS 637: Performed by: HOSPITALIST

## 2021-09-06 PROCEDURE — 94640 AIRWAY INHALATION TREATMENT: CPT | Mod: 76

## 2021-09-06 PROCEDURE — 999N000157 HC STATISTIC RCP TIME EA 10 MIN

## 2021-09-06 PROCEDURE — 250N000013 HC RX MED GY IP 250 OP 250 PS 637: Performed by: INTERNAL MEDICINE

## 2021-09-06 PROCEDURE — 84132 ASSAY OF SERUM POTASSIUM: CPT | Performed by: INTERNAL MEDICINE

## 2021-09-06 PROCEDURE — 94640 AIRWAY INHALATION TREATMENT: CPT

## 2021-09-06 PROCEDURE — 99239 HOSP IP/OBS DSCHRG MGMT >30: CPT | Performed by: INTERNAL MEDICINE

## 2021-09-06 PROCEDURE — 250N000012 HC RX MED GY IP 250 OP 636 PS 637: Performed by: HOSPITALIST

## 2021-09-06 PROCEDURE — 250N000009 HC RX 250: Performed by: HOSPITALIST

## 2021-09-06 PROCEDURE — 36415 COLL VENOUS BLD VENIPUNCTURE: CPT | Performed by: HOSPITALIST

## 2021-09-06 PROCEDURE — 85610 PROTHROMBIN TIME: CPT | Performed by: HOSPITALIST

## 2021-09-06 RX ORDER — WARFARIN SODIUM 2.5 MG/1
TABLET ORAL
Status: ON HOLD
Start: 2021-09-06 | End: 2022-06-13

## 2021-09-06 RX ORDER — PREDNISONE 10 MG/1
TABLET ORAL
Qty: 30 TABLET | Refills: 0 | Status: SHIPPED | OUTPATIENT
Start: 2021-09-06 | End: 2021-12-20 | Stop reason: DRUGHIGH

## 2021-09-06 RX ORDER — AZITHROMYCIN 250 MG/1
250 TABLET, FILM COATED ORAL DAILY
Qty: 2 TABLET | Refills: 0 | Status: SHIPPED | OUTPATIENT
Start: 2021-09-07 | End: 2021-09-13

## 2021-09-06 RX ADMIN — IPRATROPIUM BROMIDE AND ALBUTEROL SULFATE 3 ML: .5; 3 SOLUTION RESPIRATORY (INHALATION) at 07:59

## 2021-09-06 RX ADMIN — PREDNISONE 40 MG: 20 TABLET ORAL at 08:42

## 2021-09-06 RX ADMIN — AZITHROMYCIN MONOHYDRATE 250 MG: 250 TABLET ORAL at 08:42

## 2021-09-06 RX ADMIN — OMEPRAZOLE 20 MG: 20 CAPSULE, DELAYED RELEASE ORAL at 08:42

## 2021-09-06 RX ADMIN — IPRATROPIUM BROMIDE AND ALBUTEROL SULFATE 3 ML: .5; 3 SOLUTION RESPIRATORY (INHALATION) at 11:31

## 2021-09-06 RX ADMIN — ATORVASTATIN CALCIUM 20 MG: 20 TABLET, FILM COATED ORAL at 08:42

## 2021-09-06 RX ADMIN — FUROSEMIDE 80 MG: 40 TABLET ORAL at 08:41

## 2021-09-06 RX ADMIN — FLUTICASONE FUROATE AND VILANTEROL TRIFENATATE 1 PUFF: 200; 25 POWDER RESPIRATORY (INHALATION) at 07:59

## 2021-09-06 RX ADMIN — SPIRONOLACTONE 25 MG: 25 TABLET ORAL at 08:41

## 2021-09-06 ASSESSMENT — ACTIVITIES OF DAILY LIVING (ADL)
ADLS_ACUITY_SCORE: 18

## 2021-09-06 ASSESSMENT — MIFFLIN-ST. JEOR: SCORE: 1320.4

## 2021-09-06 NOTE — PROGRESS NOTES
Pt to D/C to home with daughter.  Pt provided with d/c instructions, including new medications, when medications were last given, and when to take them again.  Pt also informed to f/u with primary in 7 days and recheck INR at Thursday appt.  Pt verbalized understanding of all d/c and f/u instructions.  All questions were answered at this time.  Copy of paperwork sent with pt.  Medications sent to patient preferred [facility] pharmacy sent with pt.  Patient daughter to provide transport.  All personal belongings sent with pt.     Brooklyn Santos RN on 9/6/2021 at 11:54 AM

## 2021-09-06 NOTE — DISCHARGE SUMMARY
[facility]  Discharge Summary  Name: Thierry Samuel    MRN: [id number]  YOB: 1935    Age: 86 year old  Date of Discharge:  9/6/2021  Date of Admission: 9/4/2021  Primary Care Provider: [facility]  Discharge Physician:  Lalito Benjamin MD  Discharging Service:  Hospitalist      Hospital Course/Discharge Diagnoses:  Thierry Samuel is a 86 year old Danish-speaking male with a past medical history of severe COPD on 3 L via nasal cannula as needed, SUSY using CPAP, severe pulmonary hypertension, chronic heart failure with preserved ejection fraction, A. fib on warfarin status post pacemaker placement, hypertension, hyperlipidemia who presented on 9/4/2021 with generalized weakness and SOB.      Mr. Ivan Samuel was hospitalized here at [facility] from 8/10-8/12 for acute on chronic hypoxemic respiratory failure secondary to COPD exacerbation, heart failure exacerbation and possible pneumonia. He was treated with prednisone taper and doxycycline. His prednisone taper ended on 8/24 and shortly after that his respiratory status worsened and he became diffusely weak.     Here in the emergency room he was noted to have expiratory wheezing, productive cough and shortness of breath.  He was afebrile and nontachycardic.  Chest x-ray showed patchy bibasilar opacities similar to a prior x-ray.  Troponin was negative and EKG showed V paced rhythm.  He was felt to clinically have a COPD exacerbation and was started on prednisone and nebulizers.    His strength improved extremely rapidly after restarting prednisone which suggest to me that he may have been adrenally insufficient.  Respiratory status has also improved and he will discharge home with a prolonged steroid taper which hopefully will prevent another bout of adrenal insufficiency.  I have advised he and his daughter to follow closely with his primary care doctor and determine if he will require some low-dose  prednisone on a semipermanent or permanent basis.    His INR was slightly supratherapeutic here and in the setting of antibiotic use I have advised that he skip his dose of Coumadin tonight and be on a slightly more conservative regimen then he previously was for the 2 days after that.  His daughter informs me they already have an INR check arranged for 9/9 which is just 3 days from now.     #SOB, generalized weakness.  COPD exacerbation:    --2-week prednisone taper given evidence of renal adrenal insufficiency.  --Scheduled nebs at home  --Continue his home controller inhaler  --Given productive cough and possible bibasilar infiltrates we did start azithromycin and he will complete a 5-day course  --Continue home Singulair  --Procalcitonin low     Generalized weakness: Noted after he stopped his prednisone taper.  Suspect this was actually adrenal insufficiency.  Weakness was dramatically improved after just 1 dose of prednisone.     #Afib on a/c s/p PPM: Continue warfarin.   Is actually slightly supratherapeutic.  Advised him to skip his dose this evening and take just 2.5 mg the next 2 days prior to the next INR check on 9/9.         #Chronic HFpEF: Weight has actually been trending down.  He is followed by cardiology team.  Will continue home lasix and spironolactone.     #HL: Continue statin     #SUSY: Continue home CPAP with 2L O2 bleed.       Discharge Disposition:  Discharged to home     Allergies:  No Known Allergies     Discharge Medications:        Review of your medicines      START taking      Dose / Directions   azithromycin 250 MG tablet  Commonly known as: ZITHROMAX  Indication: Community Acquired Pneumonia  Used for: Acute on chronic respiratory failure with hypoxia (H)      Dose: 250 mg  Start taking on: September 7, 2021  Take 1 tablet (250 mg) by mouth daily  Quantity: 2 tablet  Refills: 0     predniSONE 10 MG tablet  Commonly known as: DELTASONE  Used for: Acute on chronic respiratory failure  with hypoxia (H)      4 tabs daily for 3 days, then 3 tabs daily for 3 days, then 2 tabs daily for 3 days, then 1 tab daily for 3 days, then stop  Quantity: 30 tablet  Refills: 0        CONTINUE these medicines which may have CHANGED, or have new prescriptions. If we are uncertain of the size of tablets/capsules you have at home, strength may be listed as something that might have changed.      Dose / Directions   warfarin ANTICOAGULANT 2.5 MG tablet  Commonly known as: COUMADIN  This may have changed:     how to take this    when to take this    additional instructions  Used for: Atrial fibrillation, unspecified type (H)      Skip your coumadin dose the evening of discharge, then take 2.5 mg on Tuesday and Wednesday, and have your INR checked Thursday as scheduled.  Refills: 0        CONTINUE these medicines which have NOT CHANGED      Dose / Directions   ALBUTEROL IN      Dose: 6 puff  Inhale 6 puffs into the lungs every 8 hours 6 puffs q8h scheduled (Butovent/Salbutamol)  Refills: 0     carbamide peroxide 6.5 % otic solution  Commonly known as: DEBROX  Used for: Acute on chronic respiratory failure with hypoxia (H)      Dose: 5 drop  Place 5 drops into the right ear daily as needed for other (wax)  Quantity: 100 mL  Refills: 0     furosemide 80 MG tablet  Commonly known as: LASIX      Dose: 80 mg  Take 80 mg by mouth every morning  Refills: 0     hypromellose-dextran 0.1-0.3 % ophthalmic solution  Commonly known as: ARTIFICAL TEARS  Used for: Dry eyes      Dose: 2-3 drop  Apply 2-3 drops to eye every hour as needed for dry eyes  Quantity: 30 mL  Refills: 0     ipratropium - albuterol 0.5 mg/2.5 mg/3 mL 0.5-2.5 (3) MG/3ML neb solution  Commonly known as: DUONEB      Dose: 1 vial  Take 1 vial by nebulization every 6 hours as needed for shortness of breath / dyspnea or wheezing  Refills: 0     Lipitor 20 MG tablet  Generic drug: atorvastatin      Dose: 20 mg  Take 20 mg by mouth every morning  Refills: 0      montelukast 10 MG tablet  Commonly known as: SINGULAIR      Dose: 10 mg  Take 10 mg by mouth every evening  Refills: 0     omeprazole 20 MG DR capsule  Commonly known as: priLOSEC      Dose: 20 mg  Take 20 mg by mouth 2 times daily  Refills: 0     spironolactone 25 MG tablet  Commonly known as: ALDACTONE  Used for: Bilateral lower extremity edema      Dose: 25 mg  Take 1 tablet (25 mg) by mouth daily  Quantity: 90 tablet  Refills: 3     tiotropium 18 MCG inhaled capsule  Commonly known as: SPIRIVA      Dose: 18 mcg  Inhale 18 mcg into the lungs daily  Refills: 0     UNABLE TO FIND      Dose: 2 puff  Inhale 2 puffs into the lungs 2 times daily MEDICATION NAME: Serflu' (salmeterol xinafoate 25 mcg fluticasone propionate 250 mcg)  Inhaler 2 puffs bid.  Refills: 0           Where to get your medicines      These medications were sent to [id number] - [place], [place] - [address]  [address] [facility] [address]    Phone: [phone]     azithromycin 250 MG tablet    predniSONE 10 MG tablet         Condition on Discharge:  Discharge condition: Stable       Code status on discharge: Full Code     History of Illness:  See detailed admission note for full details.    Physical Exam:  Vital signs:  Temp: 97.7  F (36.5  C) Temp src: Temporal BP: 128/63 Pulse: 60   Resp: 20 SpO2: 96 % O2 Device: None (Room air)   Height: 152.4 cm (5') Weight: 78.8 kg (173 lb 11.2 oz)  Estimated body mass index is 33.92 kg/m  as calculated from the following:    Height as of this encounter: 1.524 m (5').    Weight as of this encounter: 78.8 kg (173 lb 11.2 oz).    Wt Readings from Last 1 Encounters:   09/06/21 78.8 kg (173 lb 11.2 oz)     General: Alert, awake, no acute distress.  HEENT: NC/AT, eyes anicteric, external occular movements intact, face symmetric.   Cardiac: RRR, S1, S2.  No murmurs appreciated.  Pulmonary: Normal chest rise, slightly diminished diffusely with normal work of breathing today.  Somewhat  barrel chested.  Abdomen: soft, non-tender, non-distended.  Bowel Sounds Present.  No guarding.  Extremities: no deformities.  Warm, well perfused.  Skin: no rashes or lesions noted.  Warm and Dry.  Neuro: No focal deficits noted.  Speech clear.  Coordination and strength grossly normal.  Psych: Appropriate affect.    Procedures other than Imaging:  none     Imaging:  Results for orders placed or performed during the hospital encounter of 09/04/21   XR Chest Port 1 View    Narrative    EXAM: XR CHEST PORT 1 VIEW  LOCATION: [facility]  DATE/TIME: 9/4/2021 7:37 PM    INDICATION: weakness, shortness of breath  COMPARISON: Chest x-ray 08/10/2021      Impression    IMPRESSION: Stable single lead right-sided cardiac conduction device. Low lung volumes. Similar appearance of mild patchy bibasilar pulmonary opacities which may reflect atelectasis or mild infiltrates. No definite pleural effusion. Stable cardiomegaly.   No overt vascular congestion or pulmonary edema.        Consultations:  No consultations were requested during this admission.       Recent Lab Results:  Recent Labs   Lab 09/05/21  0712 09/04/21  1933 09/03/21  1251   WBC 5.0 7.1 8.6   HGB 13.1* 14.0 14.0   HCT 40.6 42.9 42.5   MCV 95 98 94    197 183          Lab Results   Component Value Date     09/05/2021     09/04/2021     09/03/2021     05/28/2021     05/27/2021     05/26/2021    Lab Results   Component Value Date    CHLORIDE 106 09/05/2021    CHLORIDE 105 09/04/2021    CHLORIDE 103 09/03/2021    CHLORIDE 104 05/28/2021    CHLORIDE 105 05/27/2021    CHLORIDE 105 05/26/2021    Lab Results   Component Value Date    BUN 15 09/05/2021    BUN 20 09/04/2021    BUN 15 09/03/2021    BUN 26 05/28/2021    BUN 22 05/27/2021    BUN 21 05/26/2021      Lab Results   Component Value Date    POTASSIUM 4.5 09/06/2021    POTASSIUM 4.7 09/05/2021    POTASSIUM 4.8 09/04/2021    POTASSIUM 3.8 05/28/2021     POTASSIUM 3.9 05/27/2021    POTASSIUM 4.0 05/26/2021    Lab Results   Component Value Date    CO2 24 09/05/2021    CO2 30 09/04/2021    CO2 25 09/03/2021    CO2 30 05/28/2021    CO2 29 05/27/2021    CO2 30 05/26/2021    Lab Results   Component Value Date    CR 0.65 09/05/2021    CR 0.96 09/04/2021    CR 0.89 09/03/2021    CR 0.78 05/28/2021    CR 0.84 05/27/2021    CR 0.72 05/26/2021             Pending Results:    Unresulted Labs Ordered in the Past 30 Days of this Admission     No orders found from 8/5/2021 to 9/5/2021.           Discharge Instructions and Follow-Up:   Discharge Procedure Orders   Medication Therapy Management Referral   Referral Priority: Routine Referral Type: Med Therapy Management   Requested Specialty: Pharmacist   Number of Visits Requested: 1     Reason for your hospital stay   Order Comments: You were admitted for COPD exacerbation in addition to generalized weakness which was likely related to adrenal insufficiency, which can happen when steroids are stopped.    You were treated with prednisone, nebs and azithromycin.  He will be discharged with a 2-week prednisone taper and I like you to talk with your primary care doctor about whether or not you should be on low-dose steroid long-term.     Follow-up and recommended labs and tests    Order Comments: Follow up with primary care provider, [facility], within 7 days for hospital follow- up.  The following labs/tests are recommended:     Recheck an INR this Thursday as planned, skip your Coumadin dose this evening then take only 2.5 mg on Tuesday and Wednesday.  You might need a longer term dose adjustment.    Discussed with your primary care doctor whether you need to be on steroids longer term.     Activity   Order Comments: Your activity upon discharge: activity as tolerated     Order Specific Question Answer Comments   Is discharge order? Yes      Diet   Order Comments: Follow this diet upon discharge: Orders Placed This  Encounter      Combination Diet Low Saturated Fat Na <2400mg Diet, No Caffeine Diet     Order Specific Question Answer Comments   Is discharge order? Yes        Total time spent in face to face contact with the patient and coordinating discharge was:  50 Minutes.

## 2021-09-06 NOTE — PLAN OF CARE
RN [phone]  A/O, VSS speaks French daughter interprets.  Shortness of breath with excretion IS encouraged scheduled nebs done.  Continuous pulse ox on. On Room Air Frequent productive cough

## 2021-09-06 NOTE — PROGRESS NOTES
Oxygen Documentation:   I certify that this patient, Thierry Samuel has been under my care (or a nurse practitioner or physican's assistant working with me). This is the face-to-face encounter for oxygen medical necessity.      Thierry Samuel is now in a chronic stable state and continues to require supplemental oxygen. Patient has continued oxygen desaturation due to COPD J44.9.    Alternative treatment(s) tried or considered and deemed clinically infective for treatment of COPD J44.9 include nebulizers, inhalers, steroids, and pulmonary toileting.  If portability is ordered, is the patient mobile within the home? yes    **Patients who qualify for home O2 coverage under the CMS guidelines require ABG tests or O2 sat readings obtained closest to, but no earlier than 2 days prior to the discharge, as evidence of the need for home oxygen therapy. Testing must be performed while patient is in the chronic stable state. See notes for O2 sats.**

## 2021-09-06 NOTE — PROGRESS NOTES
Assumed care for patient [id number].   Pt is A/O x4, VSS. Kyrgyz speaking, daughter in the room and prefers to interpret. Up with SBA. Expiratory wheezing present, productive cough, IS encouraged. Per AM shift, voiding small amounts- bladder scan 92.

## 2021-09-07 ENCOUNTER — TELEPHONE (OUTPATIENT)
Dept: FAMILY MEDICINE | Facility: CLINIC | Age: 86
End: 2021-09-07

## 2021-09-07 NOTE — TELEPHONE ENCOUNTER
Letter mailed to pt regarding   Welcome & Entry SB #3 completed on 9/7/2021 by Eileen Ashley RN. Patient has been provided with Welcome Letter SB #3 and has been oriented to services.    Eileen LOPEZ RN

## 2021-09-13 ENCOUNTER — VIRTUAL VISIT (OUTPATIENT)
Dept: PHARMACY | Facility: CLINIC | Age: 86
End: 2021-09-13
Attending: HOSPITALIST

## 2021-09-13 DIAGNOSIS — H04.123 DRY EYES: ICD-10-CM

## 2021-09-13 DIAGNOSIS — Z13.220 LIPID SCREENING: ICD-10-CM

## 2021-09-13 DIAGNOSIS — I48.20 CHRONIC ATRIAL FIBRILLATION (H): ICD-10-CM

## 2021-09-13 DIAGNOSIS — I10 BENIGN ESSENTIAL HYPERTENSION: ICD-10-CM

## 2021-09-13 DIAGNOSIS — I50.9 CHRONIC CONGESTIVE HEART FAILURE, UNSPECIFIED HEART FAILURE TYPE (H): ICD-10-CM

## 2021-09-13 DIAGNOSIS — J44.1 COPD WITH EXACERBATION (H): Primary | ICD-10-CM

## 2021-09-13 DIAGNOSIS — H61.20 IMPACTED CERUMEN, UNSPECIFIED LATERALITY: ICD-10-CM

## 2021-09-13 PROCEDURE — 99607 MTMS BY PHARM ADDL 15 MIN: CPT | Performed by: PHARMACIST

## 2021-09-13 PROCEDURE — 99605 MTMS BY PHARM NP 15 MIN: CPT | Performed by: PHARMACIST

## 2021-09-13 NOTE — PROGRESS NOTES
Medication Therapy Management (MTM) Encounter    ASSESSMENT:                            Medication Adherence/Access: No issues identified    COPD: Symptoms have improved, but he does have some overlap in medication therapy, including use of MAIN scheduled, LAMA, and concurrent use of Duonebs.       Hypertension/HF/A. Fib: Stable.  Plan in place with cardiology    Hyperlipidemia: Stable.  Unclear if patient has any history of CAD; if not statin therapy may not be necessary at his age.    Ear Wax:  Stable.    Dry Eyes:  Stable.    PLAN:                            1.  Could consider stopping statin therapy given advanced age.  2.  Could consider changing MAIN and/or Duonebs to as needed administration.    Follow-up: Will not plan on MTM follow up as future visits are not covered by insurance.  Patient does receive primary care at [facility], and they do have MTM services there which may be available to him.    SUBJECTIVE/OBJECTIVE:                          Thieryr Samuel is a 86 year old male called for an initial visit. He was referred to me from transitions of care.  Patient's daughter, Virginia, spoke on his behalf today (with his verbal authorization).  Virginia declined an .  His primary care physician is Dr. Trinity Brown at the [facility].    Reason for visit: Comprehensive medication review.    Allergies/ADRs: Reviewed in chart  Past Medical History: Reviewed in chart  Tobacco: He reports that he has quit smoking. His smoking use included cigarettes. He has never used smokeless tobacco.  Alcohol: Less than 1 beverages / week  Caffeine: Homemade tea daily  Activity: He generally walks daily (weather permitting) for 30 minutes    Medication Adherence/Access: no issues reported    COPD: Current medications: salbutamol 6 puffs three times daily (scheduled), Duonebs as needed, Spiriva 18mcg daily, Serflo (salmeterol/fluticasone) 25/250mg 2 puffs twice daily, montelukast 10mg daily, and  "prednisone taper - is currently taking 20mg daily . Patient rinses their mouth after using steroid inhaler.  They do have pulmonology appt scheduled at the end of the month.  Patient is not experiencing side effects.   Patient reports the following symptoms: none - Virginia reports Thierry is doing much better - \"he is back to normal\"  Patient does not have an COPD Action Plan on file.   Has spirometry been completed: Doesn't know    Hypertension/HF/A. Fib: Patient is working closely with cardiology.  Current medications include furosemide 80mg daily, spironolactone 25mg daily and warfarin as directed.  Patient does self-monitor blood pressure. Home BP monitoring in range of 100's systolic over 60-70's diastolic.  Patient reports no current medication side effects.  He's off losartan 25mg due to hypotension.  They reports he takes omeprazole for GI protection.  Last ECHO 8/12/2021 indicated EF of 60-65%  BP Readings from Last 3 Encounters:   09/06/21 128/63   09/03/21 120/70   08/30/21 114/65      Potassium   Date Value Ref Range Status   09/06/2021 4.5 3.4 - 5.3 mmol/L Final   05/28/2021 3.8 3.4 - 5.3 mmol/L Final        Hyperlipidemia: Current therapy includes atorvastatin 10mg daily.  Patient reports no significant myalgias or other side effects.  No lipid results on file in Epic    Ear Wax:  Patient uses Debrox ear drops as needed for ear wax.  Virginia reports this is effective, no side effects noted.    Dry Eyes:  Thierry uses artificial tears as needed, which he reports is effective.  No side effects reported.    Today's Vitals: There were no vitals taken for this visit.  ----------------  Post Discharge Medication Reconciliation Status: discharge medications reconciled, continue medications without change.    I spent 17 minutes with this patient today. I offer these suggestions for consideration by Dr. Brown and pulmonology team. A copy of the visit note was provided to the patient's primary care " provider.    The patient declined a summary of these recommendations.     Lizzeth LiuD, [facility]  Medication Therapy Management Provider  Pager: [phone]     Telemedicine Visit Details  Type of service:  Telephone visit  Start Time: 3:07 PM  End Time: 3:24 PM  Originating Location (patient location): [place]  Distant Location (provider location):  [facility]     Medication Therapy Recommendations  COPD exacerbation (H)    Current Medication: tiotropium (SPIRIVA) 18 MCG inhalation capsule   Rationale: Duplicate Therapy - Unnecessary medication therapy - Indication   Recommendation: Change Medication   Status: Contact Provider - Awaiting Response         Lipid screening    Current Medication: atorvastatin (LIPITOR) 10 MG tablet   Rationale: Nonmedication therapy more appropriate - Unnecessary medication therapy - Indication   Recommendation: Discontinue Medication   Status: Contact Provider - Awaiting Response

## 2021-09-15 ENCOUNTER — APPOINTMENT (OUTPATIENT)
Dept: INTERPRETER SERVICES | Facility: CLINIC | Age: 86
End: 2021-09-15
Payer: MEDICAID

## 2021-09-15 NOTE — PATIENT INSTRUCTIONS
Recommendations from today's MTM visit:                                                    MTM (medication therapy management) is a service provided by a clinical pharmacist designed to help you get the most of out of your medicines.   Today we reviewed what your medicines are for, how to know if they are working, that your medicines are safe and how to make your medicine regimen as easy as possible.      1.  Talk with cardiology about stopping atorvastatin.    2.  Please follow-up with pulmonology as planned - there are a few duplications in the current medications being used.    Follow-up: If needed    It was great to speak with you today.  I value your experience and would be very thankful for your time with providing feedback on our clinic survey. You may receive a survey via email or text message in the next few days.     To schedule another MTM appointment, please call the clinic directly or you may call the MTM scheduling line at [phone] or toll-free at [phone].     My Clinical Pharmacist's contact information:                                                      Please feel free to contact me with any questions or concerns you have.      Yesika Oviedo, PharmD, [facility]  Medication Therapy Management Provider  Pager: [phone]     Rosalie Sunshine, [place]  Medication Therapy Management Resident  Pager: [phone]

## 2021-09-23 ENCOUNTER — LAB (OUTPATIENT)
Dept: LAB | Facility: CLINIC | Age: 86
End: 2021-09-23
Payer: MEDICAID

## 2021-09-23 ENCOUNTER — OFFICE VISIT (OUTPATIENT)
Dept: CARDIOLOGY | Facility: CLINIC | Age: 86
End: 2021-09-23
Attending: PHYSICIAN ASSISTANT
Payer: MEDICAID

## 2021-09-23 VITALS
HEIGHT: 60 IN | SYSTOLIC BLOOD PRESSURE: 98 MMHG | DIASTOLIC BLOOD PRESSURE: 56 MMHG | HEART RATE: 60 BPM | BODY MASS INDEX: 33.38 KG/M2 | OXYGEN SATURATION: 95 % | WEIGHT: 170 LBS

## 2021-09-23 DIAGNOSIS — I50.812 CHRONIC RIGHT-SIDED HEART FAILURE (H): ICD-10-CM

## 2021-09-23 DIAGNOSIS — Z95.0 CARDIAC PACEMAKER IN SITU: ICD-10-CM

## 2021-09-23 DIAGNOSIS — R60.0 BILATERAL LOWER EXTREMITY EDEMA: ICD-10-CM

## 2021-09-23 DIAGNOSIS — I48.20 CHRONIC ATRIAL FIBRILLATION (H): ICD-10-CM

## 2021-09-23 DIAGNOSIS — R60.0 BILATERAL LOWER EXTREMITY EDEMA: Primary | ICD-10-CM

## 2021-09-23 LAB
ANION GAP SERPL CALCULATED.3IONS-SCNC: 6 MMOL/L (ref 3–14)
BUN SERPL-MCNC: 19 MG/DL (ref 7–30)
CALCIUM SERPL-MCNC: 8.5 MG/DL (ref 8.5–10.1)
CHLORIDE BLD-SCNC: 104 MMOL/L (ref 94–109)
CO2 SERPL-SCNC: 27 MMOL/L (ref 20–32)
CREAT SERPL-MCNC: 0.94 MG/DL (ref 0.66–1.25)
GFR SERPL CREATININE-BSD FRML MDRD: 74 ML/MIN/1.73M2
GLUCOSE BLD-MCNC: 127 MG/DL (ref 70–99)
POTASSIUM BLD-SCNC: 5 MMOL/L (ref 3.4–5.3)
SODIUM SERPL-SCNC: 137 MMOL/L (ref 133–144)

## 2021-09-23 PROCEDURE — 36415 COLL VENOUS BLD VENIPUNCTURE: CPT

## 2021-09-23 PROCEDURE — 80048 BASIC METABOLIC PNL TOTAL CA: CPT

## 2021-09-23 PROCEDURE — 99214 OFFICE O/P EST MOD 30 MIN: CPT | Performed by: PHYSICIAN ASSISTANT

## 2021-09-23 ASSESSMENT — MIFFLIN-ST. JEOR: SCORE: 1303.61

## 2021-09-23 NOTE — LETTER
"9/23/2021    Trinity Brown PA-C  [facility] [address] [place] [address]    RE: Thierry Leosquierdo       Dear Colleague,    I had the pleasure of seeing Thierry Samuel in the [facility].    SERVICE DATE: 9/23/2021     Primary Cardiologist: Dr Rod    REASON FOR VISIT:  Thierry Samuel presents for a 3 week follow up. His daughter is with and acts as our  per his request.     HISTORY OF PRESENT ILLNESS/ASSESSMENT AND PLAN:  Cardiovascular history includes A. fib on warfarin, s/p pacemaker placement, HTN, HLD and HFpEF-probably mostly right HF/pulmonary HTN. Also has severe COPD on 3 L via nasal cannula as needed, SUSY using CPAP and history of GI bleed and history chronic left leg wound. Was in to establish care with Dr Rod mid Aug and was noted to have significant LE edema, probably mostly related to right HF. Spironolactone 25mg daily started.     I saw him 8/30, weight was down and LE edema improved. Pt was feeling better with increased energy until he had his PPM check/reprogrammed on 8/25. He also noted cramping in his legs and hands. I stopped his 40mg afternoon dose of lasix.    After that appt his device was reprogrammed to prior settings but he continued to get weaker/more dyspneic. Ended up hospitalized for a few days earlier this month and was felt to have a COPD exacerbation and likely adrenal insuffiencey from steroid withdrawal.    Since then is feeling better.  His daughter states that he will be maintained on 5mg of prednisone daily. He does note a little bit of LE edema and some swelling under his arms. He still gets cramps in his legs and hands and his legs are also tender to touch. Denies lightheadedness/dizziness. Daughter states was taken off 'BP med\" (I believe this was losartan) earlier this year as he was having some low BP readings.     1. LE edema, likely multifactorial " but mostly related to R HF.   -Significant improvement with spironolactone.   -Continue furosemide 80mg and spironolactone 25mg daily in the AM. Hesitate to increase diuretics at this point due to low BP. Discussed some of the puffiness might be from prednisone use.  -BMP stable, although potassium 5 today but it was mid 4s when he was in the hospital on the same diuretic regimen recently. Will keep an eye on the potassium  -Call if his weight is going up, taylor 3 lbs in a day or 5lbs in a week    2. S/P PPM  -follow up in device clinic routinely    3. Chronic afib  -On warfarin for AC    4. HFpEF-probably mostly right HF/pulmonary HTN.    5. COPD with oxygen/SUSY    Follow up: with me in 3 months with a BMP prior.  Of course, the patient was encouraged to contact us sooner with questions or concerns.    Sarah Lehman PA-C      CURRENT MEDICATIONS:   Current Outpatient Medications   Medication Sig Dispense Refill     ALBUTEROL IN Inhale 6 puffs into the lungs every 8 hours 6 puffs q8h scheduled (Butovent/Salbutamol)       atorvastatin (LIPITOR) 10 MG tablet Take 10 mg by mouth daily        carbamide peroxide (DEBROX) 6.5 % otic solution Place 5 drops into the right ear daily as needed for other (wax) 100 mL 0     furosemide (LASIX) 80 MG tablet Take 80 mg by mouth every morning       hypromellose-dextran (ARTIFICAL TEARS) 0.1-0.3 % SOLN ophthalmic solution Apply 2-3 drops to eye every hour as needed for dry eyes 30 mL 0     ipratropium - albuterol 0.5 mg/2.5 mg/3 mL (DUONEB) 0.5-2.5 (3) MG/3ML neb solution Take 1 vial by nebulization every 6 hours as needed for shortness of breath / dyspnea or wheezing       montelukast (SINGULAIR) 10 MG tablet Take 10 mg by mouth every evening       omeprazole (PRILOSEC) 20 MG DR capsule Take 20 mg by mouth 2 times daily       predniSONE (DELTASONE) 10 MG tablet 4 tabs daily for 3 days, then 3 tabs daily for 3 days, then 2 tabs daily for 3 days, then 1 tab daily for 3 days,  then stop (Patient taking differently: Taking 5 mg, 4 tabs daily for 3 days, then 3 tabs daily for 3 days, then 2 tabs daily for 3 days, then 1 tab daily for 3 days, then stop) 30 tablet 0     spironolactone (ALDACTONE) 25 MG tablet Take 1 tablet (25 mg) by mouth daily 90 tablet 3     tiotropium (SPIRIVA) 18 MCG inhalation capsule Inhale 18 mcg into the lungs daily       UNABLE TO FIND Inhale 2 puffs into the lungs 2 times daily MEDICATION NAME: Serflo (salmeterol xinafoate 25 mcg fluticasone propionate 250 mcg)  Inhaler 2 puffs bid.       warfarin ANTICOAGULANT (COUMADIN) 2.5 MG tablet Skip your coumadin dose the evening of discharge, then take 2.5 mg on Tuesday and Wednesday, and have your INR checked Thursday as scheduled. (Patient taking differently: 5mg on Mon and Wed, 2.5mg the rest of the week.  Next INR 9/17)         ALLERGIES:  No Known Allergies    ROS:  Skin:  Positive for bruising;itching   Eyes:  Positive for visual blurring  ENT:  Positive for hearing loss  Respiratory:  Positive for shortness of breath;dyspnea on exertion;cough;CPAP;sleep apnea  Cardiovascular:    Positive for;edema;fatigue  Gastroenterology: Negative    Genitourinary:  not assessed    Musculoskeletal:  Positive for back pain;neck pain  Neurologic:  Positive for numbness or tingling of hands;numbness or tingling of feet  Psychiatric:  Negative    Heme/Lymph/Imm:  Positive for allergies  Endocrine:  Negative      PHYSICAL EXAMINATION:    GENERAL:  The patient is a pleasant 86 year old who appears their stated age.  In no apparent distress.  VITAL SIGNS:  BP 98/56 (BP Location: Right arm, Patient Position: Sitting, Cuff Size: Adult Regular)   Pulse 60   Ht 1.524 m (5')   Wt 77.1 kg (170 lb)   SpO2 95%   BMI 33.20 kg/m      RESPIRATORY:  Breathing is nonlabored.  Lungs decreased air movement, scattered crackles throughout  CARDIAC:  RRR  EXTREMITIES:  1+ BLE edema  NEUROLOGIC:  Alert and oriented.    DATA REVIEWED:    Component       Latest Ref Rng & Units 9/23/2021   Sodium      133 - 144 mmol/L 137   Potassium      3.4 - 5.3 mmol/L 5.0   Chloride      94 - 109 mmol/L 104   Carbon Dioxide      20 - 32 mmol/L 27   Anion Gap      3 - 14 mmol/L 6   Urea Nitrogen      7 - 30 mg/dL 19   Creatinine      0.66 - 1.25 mg/dL 0.94   Calcium      8.5 - 10.1 mg/dL 8.5   Glucose      70 - 99 mg/dL 127 (H)   GFR Estimate      >60 mL/min/1.73m2 74       Thank you for allowing me to participate in the care of your patient.      Sincerely,     Sarah Lehman PA-C     [facility]  cc:   Sarah Lehman PA-C  [facility][note 3] SPECIALTY  [address] [place] [address]

## 2021-09-23 NOTE — PATIENT INSTRUCTIONS
Thank you for your [facility] visit today. Your provider has recommended the following:  Medication Changes:  No changes today  Recommendations:  Please call if you notice weight gain of 3 lbs in a day or 5lbs in a week especially if associated with swelling in the legs or increasing shortness of breath  Follow-up:  Cardiology follow up in 3 months with lab work at that time.    Reminder:  Please bring in your current medication list or your medication, over the counter supplements and vitamin bottles as we will review these at each office visit.

## 2021-09-23 NOTE — PROGRESS NOTES
"SERVICE DATE: 9/23/2021     Primary Cardiologist: Dr Rod    REASON FOR VISIT:  Thierry Samuel presents for a 3 week follow up. His daughter is with and acts as our  per his request.     HISTORY OF PRESENT ILLNESS/ASSESSMENT AND PLAN:  Cardiovascular history includes A. fib on warfarin, s/p pacemaker placement, HTN, HLD and HFpEF-probably mostly right HF/pulmonary HTN. Also has severe COPD on 3 L via nasal cannula as needed, SUSY using CPAP and history of GI bleed and history chronic left leg wound. Was in to establish care with Dr Rod mid Aug and was noted to have significant LE edema, probably mostly related to right HF. Spironolactone 25mg daily started.     I saw him 8/30, weight was down and LE edema improved. Pt was feeling better with increased energy until he had his PPM check/reprogrammed on 8/25. He also noted cramping in his legs and hands. I stopped his 40mg afternoon dose of lasix.    After that appt his device was reprogrammed to prior settings but he continued to get weaker/more dyspneic. Ended up hospitalized for a few days earlier this month and was felt to have a COPD exacerbation and likely adrenal insuffiencey from steroid withdrawal.    Since then is feeling better.  His daughter states that he will be maintained on 5mg of prednisone daily. He does note a little bit of LE edema and some swelling under his arms. He still gets cramps in his legs and hands and his legs are also tender to touch. Denies lightheadedness/dizziness. Daughter states was taken off 'BP med\" (I believe this was losartan) earlier this year as he was having some low BP readings.     1. LE edema, likely multifactorial but mostly related to R HF.   -Significant improvement with spironolactone.   -Continue furosemide 80mg and spironolactone 25mg daily in the AM. Hesitate to increase diuretics at this point due to low BP. Discussed some of the puffiness might be from prednisone use.  -BMP stable, although " potassium 5 today but it was mid 4s when he was in the hospital on the same diuretic regimen recently. Will keep an eye on the potassium  -Call if his weight is going up, taylor 3 lbs in a day or 5lbs in a week    2. S/P PPM  -follow up in device clinic routinely    3. Chronic afib  -On warfarin for AC    4. HFpEF-probably mostly right HF/pulmonary HTN.    5. COPD with oxygen/SUSY    Follow up: with me in 3 months with a BMP prior.  Of course, the patient was encouraged to contact us sooner with questions or concerns.    Sarah Lehman PA-C      CURRENT MEDICATIONS:   Current Outpatient Medications   Medication Sig Dispense Refill     ALBUTEROL IN Inhale 6 puffs into the lungs every 8 hours 6 puffs q8h scheduled (Butovent/Salbutamol)       atorvastatin (LIPITOR) 10 MG tablet Take 10 mg by mouth daily        carbamide peroxide (DEBROX) 6.5 % otic solution Place 5 drops into the right ear daily as needed for other (wax) 100 mL 0     furosemide (LASIX) 80 MG tablet Take 80 mg by mouth every morning       hypromellose-dextran (ARTIFICAL TEARS) 0.1-0.3 % SOLN ophthalmic solution Apply 2-3 drops to eye every hour as needed for dry eyes 30 mL 0     ipratropium - albuterol 0.5 mg/2.5 mg/3 mL (DUONEB) 0.5-2.5 (3) MG/3ML neb solution Take 1 vial by nebulization every 6 hours as needed for shortness of breath / dyspnea or wheezing       montelukast (SINGULAIR) 10 MG tablet Take 10 mg by mouth every evening       omeprazole (PRILOSEC) 20 MG DR capsule Take 20 mg by mouth 2 times daily       predniSONE (DELTASONE) 10 MG tablet 4 tabs daily for 3 days, then 3 tabs daily for 3 days, then 2 tabs daily for 3 days, then 1 tab daily for 3 days, then stop (Patient taking differently: Taking 5 mg, 4 tabs daily for 3 days, then 3 tabs daily for 3 days, then 2 tabs daily for 3 days, then 1 tab daily for 3 days, then stop) 30 tablet 0     spironolactone (ALDACTONE) 25 MG tablet Take 1 tablet (25 mg) by mouth daily 90 tablet 3      tiotropium (SPIRIVA) 18 MCG inhalation capsule Inhale 18 mcg into the lungs daily       UNABLE TO FIND Inhale 2 puffs into the lungs 2 times daily MEDICATION NAME: Serflo (salmeterol xinafoate 25 mcg fluticasone propionate 250 mcg)  Inhaler 2 puffs bid.       warfarin ANTICOAGULANT (COUMADIN) 2.5 MG tablet Skip your coumadin dose the evening of discharge, then take 2.5 mg on Tuesday and Wednesday, and have your INR checked Thursday as scheduled. (Patient taking differently: 5mg on Mon and Wed, 2.5mg the rest of the week.  Next INR 9/17)         ALLERGIES:  No Known Allergies    ROS:  Skin:  Positive for bruising;itching   Eyes:  Positive for visual blurring  ENT:  Positive for hearing loss  Respiratory:  Positive for shortness of breath;dyspnea on exertion;cough;CPAP;sleep apnea  Cardiovascular:    Positive for;edema;fatigue  Gastroenterology: Negative    Genitourinary:  not assessed    Musculoskeletal:  Positive for back pain;neck pain  Neurologic:  Positive for numbness or tingling of hands;numbness or tingling of feet  Psychiatric:  Negative    Heme/Lymph/Imm:  Positive for allergies  Endocrine:  Negative      PHYSICAL EXAMINATION:    GENERAL:  The patient is a pleasant 86 year old who appears their stated age.  In no apparent distress.  VITAL SIGNS:  BP 98/56 (BP Location: Right arm, Patient Position: Sitting, Cuff Size: Adult Regular)   Pulse 60   Ht 1.524 m (5')   Wt 77.1 kg (170 lb)   SpO2 95%   BMI 33.20 kg/m      RESPIRATORY:  Breathing is nonlabored.  Lungs decreased air movement, scattered crackles throughout  CARDIAC:  RRR  EXTREMITIES:  1+ BLE edema  NEUROLOGIC:  Alert and oriented.    DATA REVIEWED:    Component      Latest Ref Rng & Units 9/23/2021   Sodium      133 - 144 mmol/L 137   Potassium      3.4 - 5.3 mmol/L 5.0   Chloride      94 - 109 mmol/L 104   Carbon Dioxide      20 - 32 mmol/L 27   Anion Gap      3 - 14 mmol/L 6   Urea Nitrogen      7 - 30 mg/dL 19   Creatinine      0.66 - 1.25  mg/dL 0.94   Calcium      8.5 - 10.1 mg/dL 8.5   Glucose      70 - 99 mg/dL 127 (H)   GFR Estimate      >60 mL/min/1.73m2 74

## 2021-09-28 ENCOUNTER — APPOINTMENT (OUTPATIENT)
Dept: INTERPRETER SERVICES | Facility: CLINIC | Age: 86
End: 2021-09-28
Payer: MEDICAID

## 2021-09-29 ENCOUNTER — OFFICE VISIT (OUTPATIENT)
Dept: SLEEP MEDICINE | Facility: CLINIC | Age: 86
End: 2021-09-29
Payer: MEDICAID

## 2021-09-29 VITALS
HEART RATE: 67 BPM | DIASTOLIC BLOOD PRESSURE: 68 MMHG | BODY MASS INDEX: 33.18 KG/M2 | SYSTOLIC BLOOD PRESSURE: 121 MMHG | HEIGHT: 60 IN | WEIGHT: 169 LBS | OXYGEN SATURATION: 96 %

## 2021-09-29 DIAGNOSIS — J44.9 CHRONIC OBSTRUCTIVE PULMONARY DISEASE, UNSPECIFIED COPD TYPE (H): ICD-10-CM

## 2021-09-29 DIAGNOSIS — I50.9 CHRONIC CONGESTIVE HEART FAILURE, UNSPECIFIED HEART FAILURE TYPE (H): ICD-10-CM

## 2021-09-29 DIAGNOSIS — G47.33 OSA (OBSTRUCTIVE SLEEP APNEA): Primary | ICD-10-CM

## 2021-09-29 DIAGNOSIS — J96.21 ACUTE ON CHRONIC RESPIRATORY FAILURE WITH HYPOXIA (H): ICD-10-CM

## 2021-09-29 PROCEDURE — 99205 OFFICE O/P NEW HI 60 MIN: CPT | Performed by: PHYSICIAN ASSISTANT

## 2021-09-29 PROCEDURE — 99417 PROLNG OP E/M EACH 15 MIN: CPT | Performed by: PHYSICIAN ASSISTANT

## 2021-09-29 ASSESSMENT — MIFFLIN-ST. JEOR: SCORE: 1299.08

## 2021-09-29 NOTE — PROGRESS NOTES
Sleep Consultation:    Date on this visit: 9/29/2021    Thierry Love Lizzie is sent by No ref. provider found for a sleep consultation regarding SUSY.    Primary Physician: Trinity Brown reports previously diagnosed SUSY for 10 years. His medical history is significant for COPD on 2.5 lpm O2 at night, CHF (HFpEF-probably mostly right HF/pulmonary HTN), HTN, A-Fib (s/p pacemaker), chronic respiratory failure with hypoxemia.  Last ECHO 8/12/2021 indicated EF of 60-65%. His daughter, Virginia, is with and acts as our  per his request.       He has been admitted 3 times in the last 4 months for respiratory failure, COPD exacerbations. His CO2 was as high as 51 on VBG during exacerbations. He has problems with his breathing every time prednisone is stopped. He was recently initiated on 5 mg prednisone daily to see if that helps.      He was advised to have a sleep evaluation while in the hospital. He was on CPAP in the hospital, although the settings are unknown. Even with CPAP, his oxygen running in the 70-80% range. The thought was that part of that was subsequent to mouth breathing through a nasal mask. At home, he finds the full face mask very itchy on his nose and it makes him wake more.   He checks his oxygen when he wakes. It tends to be 92-97%.     [facility] He has not seen anyone regarding his sleep for at least 3 years. He has never had a sleep evaluation here. His machine is a very old style of ResMed set at 9 cm. He has 2.5 lpm O2 bled in (supplied by [facility]). I checked the pressure against a manometer and it seems to read about 1 cm less than the set pressure.   He does snore with the CPAP. No mask leak is noted with the full face mask. He is comfortable with the pressure, but says he does not know anything different. He gets a little dry mouth. His machine does not have a humidifier. His daughter says he complains  every day about feeling dry when he wakes.     Thierry goes to sleep at 10:00 PM during the week. He wakes up at 8:00 AM without an alarm. He falls asleep in 5-10 minutes.  Thierry has difficulty falling asleep if his nose is itchy.  He wakes up 3-4 times a night for 30+ minutes before falling back to sleep.  Thierry wakes up to go to the bathroom and itchy nose.  On weekends, his sleep schedule is the same.  Patient gets an average of 5 hours of sleep per night.     Patient does not use electronics in bed, watch TV in bed, and read in bed. He does get a racing mind at night.     Thierry is retired.  [place] has been in [place] since [place]. He was here in 2018 and 2019. He was stuck in [facility] from 2019 to May due to COVID.    Thierry does not have a regular bed partner. Patient sleeps on his back and side. He does not seem to have significant restless legs. He does have an urge to stretch when he gets into bed but it resolves quickly. He has a lot of cramps in his legs and hands. He denies no snort arousals, morning headaches and morning confusion. Thierry denies any bruxism, sleep walking, sleep talking, dream enactment, sleep paralysis, cataplexy and hypnogogic/hypnopompic hallucinations.    Thierry denies difficulty breathing through his nose, claustrophobia, reflux at night, heartburn and depression.      Thierry has lost 0-5 pounds in the last 8 years.  Patient describes themself as a morning person.  He would prefer to go to sleep at 10:00 PM and wake up at 8:00 AM.  Patient's [place] Sleepiness score 6/24 inconsistent with daytime sleepiness.      Thierry does not take naps. He does not feel the need. He takes frequent inadvertant naps watching TV on the couch.  He does not drive.  Patient was counseled on the importance of driving while alert, to pull over if drowsy, or nap before getting into the vehicle if sleepy.  He uses very little caffeine.     Allergies:    No Known Allergies    Medications:    Current Outpatient  Medications   Medication Sig Dispense Refill     ALBUTEROL IN Inhale 6 puffs into the lungs every 8 hours 6 puffs q8h scheduled (Butovent/Salbutamol)       atorvastatin (LIPITOR) 10 MG tablet Take 10 mg by mouth daily        carbamide peroxide (DEBROX) 6.5 % otic solution Place 5 drops into the right ear daily as needed for other (wax) 100 mL 0     furosemide (LASIX) 80 MG tablet Take 80 mg by mouth every morning       hypromellose-dextran (ARTIFICAL TEARS) 0.1-0.3 % SOLN ophthalmic solution Apply 2-3 drops to eye every hour as needed for dry eyes 30 mL 0     ipratropium - albuterol 0.5 mg/2.5 mg/3 mL (DUONEB) 0.5-2.5 (3) MG/3ML neb solution Take 1 vial by nebulization every 6 hours as needed for shortness of breath / dyspnea or wheezing       montelukast (SINGULAIR) 10 MG tablet Take 10 mg by mouth every evening       omeprazole (PRILOSEC) 20 MG DR capsule Take 20 mg by mouth 2 times daily       predniSONE (DELTASONE) 10 MG tablet 4 tabs daily for 3 days, then 3 tabs daily for 3 days, then 2 tabs daily for 3 days, then 1 tab daily for 3 days, then stop (Patient taking differently: Taking 5 mg, 4 tabs daily for 3 days, then 3 tabs daily for 3 days, then 2 tabs daily for 3 days, then 1 tab daily for 3 days, then stop) 30 tablet 0     spironolactone (ALDACTONE) 25 MG tablet Take 1 tablet (25 mg) by mouth daily 90 tablet 3     tiotropium (SPIRIVA) 18 MCG inhalation capsule Inhale 18 mcg into the lungs daily       UNABLE TO FIND Inhale 2 puffs into the lungs 2 times daily MEDICATION NAME: Serflo (salmeterol xinafoate 25 mcg fluticasone propionate 250 mcg)  Inhaler 2 puffs bid.       warfarin ANTICOAGULANT (COUMADIN) 2.5 MG tablet Skip your coumadin dose the evening of discharge, then take 2.5 mg on Tuesday and Wednesday, and have your INR checked Thursday as scheduled. (Patient taking differently: 5mg on Mon and Wed, 2.5mg the rest of the week.  Next INR 9/17)         Problem List:  Patient Active Problem List     Diagnosis Date Noted     Muscle pain 09/04/2021     Priority: Medium     Weakness generalized 09/04/2021     Priority: Medium     Dyspnea, unspecified type 09/04/2021     Priority: Medium     Acute on chronic respiratory failure with hypoxia (H) 08/10/2021     Priority: Medium     COPD exacerbation (H) 05/26/2021     Priority: Medium     Contusion of face, scalp and neck, initial encounter 05/26/2021     Priority: Medium     CHF (congestive heart failure) (H) 09/27/2018     Priority: Medium     COPD with exacerbation (H) 08/20/2013     Priority: Medium        Past Medical/Surgical History:  Past Medical History:   Diagnosis Date     A-fib (H)     s/p pacemaker     Cardiac pacemaker in situ      CHF (congestive heart failure) (H) 09/27/2018     COPD (chronic obstructive pulmonary disease) (H)      GI bleed      Hypertension      Long term current use of anticoagulant therapy      Moderate to severe pulmonary hypertension (H)      SUSY (obstructive sleep apnea)      SUSY (obstructive sleep apnea)      Past Surgical History:   Procedure Laterality Date     APPENDECTOMY       CARDIAC SURGERY         Social History:  Social History     Socioeconomic History     Marital status:      Spouse name: Not on file     Number of children: Not on file     Years of education: Not on file     Highest education level: Not on file   Occupational History     Not on file   Tobacco Use     Smoking status: Former Smoker     Types: Cigarettes     Smokeless tobacco: Never Used   Vaping Use     Vaping Use: Never used   Substance and Sexual Activity     Alcohol use: Not Currently     Comment: social     Drug use: No     Sexual activity: Not Currently   Other Topics Concern     Parent/sibling w/ CABG, MI or angioplasty before 65F 55M? Not Asked   Social History Narrative     Not on file     Social Determinants of Health     Financial Resource Strain:      Difficulty of Paying Living Expenses:    Food Insecurity:      Worried About Running  Out of Food in the Last Year:      Ran Out of Food in the Last Year:    Transportation Needs:      Lack of Transportation (Medical):      Lack of Transportation (Non-Medical):    Physical Activity:      Days of Exercise per Week:      Minutes of Exercise per Session:    Stress:      Feeling of Stress :    Social Connections:      Frequency of Communication with Friends and Family:      Frequency of Social Gatherings with Friends and Family:      Attends [employer] Services:      Active Member of Clubs or Organizations:      Attends Club or Organization Meetings:      Marital Status:    Intimate Partner Violence:      Fear of Current or Ex-Partner:      Emotionally Abused:      Physically Abused:      Sexually Abused:        Family History:  Family History   Problem Relation Age of Onset     No Known Problems Mother      No Known Problems Father        Review of Systems:  A complete review of systems reviewed by me is negative with the exeption of what has been mentioned in the history of present illness.  CONSTITUTIONAL: NEGATIVE for weight gain/loss, fever, chills, sweats or night sweats, drug allergies.  EYES: NEGATIVE for changes in vision, blind spots, double vision.  EYES:  POSITIVE for white cloudy spots in the morning  ENT: NEGATIVE for ear pain, sore throat, sinus pain, post-nasal drip, runny nose, bloody nose  CARDIAC: NEGATIVE for fast heartbeats or fluttering in chest, chest pain or pressure, swollen legs or swollen feet.  CARDIAC:  POSITIVE for  breathlessness when lying flat  NEUROLOGIC: NEGATIVE headaches, weakness or numbness in the arms or legs.  PULMONARY: NEGATIVE SOB at rest, dry cough, coughing up blood,  whistling when breathing.    PULMONARY:  POSITIVE for  SOB with activity, productive cough and wheezing   GASTROINTESTINAL: NEGATIVE for nausea or vomitting, loose or watery stools, fat or grease in stools, constipation, abdominal pain, bowel movements black in color or blood  noted.  GENITOURINARY: NEGATIVE for pain during urination, blood in urine, urinating more frequently than usual, irregular menstrual periods.  MUSCULOSKELETAL: NEGATIVE for bone or joint pain, swollen joints.  MUSCULOSKELETAL:  POSITIVE for  cramps    Physical Examination:  Vitals: /68   Pulse 67   Ht 1.524 m (5')   Wt 76.7 kg (169 lb)   SpO2 96%   BMI 33.01 kg/m           [place] Total Score 9/29/2021   Total score - [place] 6       KATY Total Score: 14 (09/29/21 1400)    GENERAL APPEARANCE: healthy, alert, no distress and cooperative  EYES: Eyes grossly normal to inspection, PERRL and conjunctivae and sclerae normal  HENT: oropharynx crowded, tongue base enlarged and dentures  NECK: no asymmetry, masses, or scars  RESP: rhonchi throughout  CV: regular rates and rhythm and no murmur, click or rub    Mallampati Class: IV.    Impression/Plan:    (G47.33) SUSY (obstructive sleep apnea)  (primary encounter diagnosis), (J96.21) Acute on chronic respiratory failure with hypoxia (H), (J44.9) Chronic obstructive pulmonary disease, unspecified COPD type (H), (I50.9) Chronic congestive heart failure, unspecified heart failure type (H)  Comment: Thierry presents with his daughter, who acts as , for management of sleep disordered breathing. He was diagnosed with SUSY about 10 years ago in [facility]. He has been on CPAP since then. His machine is very old. It is set at 9 cm. He also is on 2.5 lpm O2 bled in through the CPAP for hypoxemia related to COPD. He recently switched to a full face mask during a hospital stay because he was observed to mouth breathe. He gets an itchy nose under the mask, so he sometimes removes it. He has been admitted 3 times in the last year for COPD exacerbations and acute on chronic hypoxic respiratory failure. His CO2 on metabolic panel and his O2 levels seem to be within the normal range between these exacerbations. His CPAP seems to blow 1 cm less than the set pressure. He is  observed to snore with CPAP. He dozes frequently while watching TV. BM is 33, age 86.  Plan: I changed the pressure to 12 cm, so it will blow 11 cm. I am increasing the pressure empirically due to observed snoring.   Orders placed for in lab PSG with TCM. His daughter will stay to help translate and help with transitions if needed.     Literature provided regarding sleep apnea.      He will follow up with me in approximately two weeks after his sleep study has been competed to review the results and discuss plan of care.       Polysomnography reviewed.  Obstructive sleep apnea reviewed.  Complications of untreated sleep apnea were reviewed.    90 minutes were spent on the date of the encounter doing chart review, history and exam, documentation and further activities as noted above.  Bennett Goltz, PA-C     CC: No ref. provider found

## 2021-10-01 ENCOUNTER — TELEPHONE (OUTPATIENT)
Dept: SLEEP MEDICINE | Facility: CLINIC | Age: 86
End: 2021-10-01

## 2021-10-01 NOTE — TELEPHONE ENCOUNTER
Reason for call:  Other   Patient called regarding (reason for call): call back  Additional comments: Patient is requesting for CPAP settings to be change on the CPAP pressure is too high     Phone number to reach patient:  Cell number on file:    Telephone Information:   Mobile [phone]       Best Time:  Anytime    Can we leave a detailed message on this number?  YES    Travel screening: Not Applicable

## 2021-10-05 NOTE — TELEPHONE ENCOUNTER
Called to speak with patient regarding cancelling sleep study due to emergency MA and they are not considering Sleep Medicine emergency. Spoke with daughter who informed me the below message and asked if there was any follow up response yet.     I informed her the provider has been informed and we are waiting to hear his advice. I gave her my direct call back number incase she has any other concerns.     Sleep Study and follow up cancelled for now.     Sabine Osuna CMA on 10/5/2021 at 3:43 PM

## 2021-10-06 DIAGNOSIS — G47.33 OSA (OBSTRUCTIVE SLEEP APNEA): Primary | ICD-10-CM

## 2021-10-06 NOTE — TELEPHONE ENCOUNTER
See  note from today. He came in and pressure adjustment was made. Prescription for CPAP purchase online was given as well.  Bennett Goltz, PA-C

## 2021-10-06 NOTE — PROGRESS NOTES
Thierry brought his CPAP in. At the last visit, I had changed the pressure from 9 cm to 12 cm. He now feels it is too high. I changed it to 10 cm and he tried it on and was more comfortable with it.   I wrote a prescription for an auto CPAP 9-15 cm that he can try to get online. He only has emergency coverage, so cannot get coverage for a sleep study or equipment for his CPAP. I showed them [email or web address].com and told them the types of machines we use, so we can get a download for them if needed.  Bennett Goltz, PA-C

## 2021-10-18 ENCOUNTER — TELEPHONE (OUTPATIENT)
Dept: SLEEP MEDICINE | Facility: CLINIC | Age: 86
End: 2021-10-18

## 2021-10-18 NOTE — TELEPHONE ENCOUNTER
Called patient but reached his daughter [place]. I let her know that her father's insurance does not have DME benefits/coverage.

## 2021-11-10 ENCOUNTER — TELEPHONE (OUTPATIENT)
Dept: FAMILY MEDICINE | Facility: CLINIC | Age: 86
End: 2021-11-10
Payer: MEDICAID

## 2021-11-10 NOTE — TELEPHONE ENCOUNTER
Removed name from care team, pt states he sees another pcp outside of [facility].       Eileen LOPEZ RN

## 2021-12-20 ENCOUNTER — LAB (OUTPATIENT)
Dept: LAB | Facility: CLINIC | Age: 86
End: 2021-12-20
Payer: MEDICAID

## 2021-12-20 ENCOUNTER — OFFICE VISIT (OUTPATIENT)
Dept: CARDIOLOGY | Facility: CLINIC | Age: 86
End: 2021-12-20
Attending: PHYSICIAN ASSISTANT
Payer: MEDICAID

## 2021-12-20 VITALS
HEIGHT: 60 IN | WEIGHT: 170.8 LBS | HEART RATE: 62 BPM | DIASTOLIC BLOOD PRESSURE: 58 MMHG | SYSTOLIC BLOOD PRESSURE: 122 MMHG | BODY MASS INDEX: 33.53 KG/M2 | OXYGEN SATURATION: 96 %

## 2021-12-20 DIAGNOSIS — I50.30 NYHA CLASS 3 HEART FAILURE WITH PRESERVED EJECTION FRACTION (H): ICD-10-CM

## 2021-12-20 DIAGNOSIS — R60.0 BILATERAL LOWER EXTREMITY EDEMA: ICD-10-CM

## 2021-12-20 DIAGNOSIS — I48.20 CHRONIC ATRIAL FIBRILLATION (H): ICD-10-CM

## 2021-12-20 DIAGNOSIS — I50.812 CHRONIC RIGHT-SIDED HEART FAILURE (H): Primary | ICD-10-CM

## 2021-12-20 DIAGNOSIS — I10 ESSENTIAL HYPERTENSION: ICD-10-CM

## 2021-12-20 DIAGNOSIS — E78.5 HYPERLIPIDEMIA LDL GOAL <100: ICD-10-CM

## 2021-12-20 LAB
ANION GAP SERPL CALCULATED.3IONS-SCNC: 6 MMOL/L (ref 3–14)
BUN SERPL-MCNC: 27 MG/DL (ref 7–30)
CALCIUM SERPL-MCNC: 9.1 MG/DL (ref 8.5–10.1)
CHLORIDE BLD-SCNC: 104 MMOL/L (ref 94–109)
CO2 SERPL-SCNC: 27 MMOL/L (ref 20–32)
CREAT SERPL-MCNC: 1.13 MG/DL (ref 0.66–1.25)
GFR SERPL CREATININE-BSD FRML MDRD: 59 ML/MIN/1.73M2
GLUCOSE BLD-MCNC: 130 MG/DL (ref 70–99)
POTASSIUM BLD-SCNC: 5 MMOL/L (ref 3.4–5.3)
SODIUM SERPL-SCNC: 137 MMOL/L (ref 133–144)

## 2021-12-20 PROCEDURE — 99214 OFFICE O/P EST MOD 30 MIN: CPT | Performed by: NURSE PRACTITIONER

## 2021-12-20 PROCEDURE — 36415 COLL VENOUS BLD VENIPUNCTURE: CPT

## 2021-12-20 PROCEDURE — 80048 BASIC METABOLIC PNL TOTAL CA: CPT

## 2021-12-20 RX ORDER — TORSEMIDE 20 MG/1
40 TABLET ORAL DAILY
Qty: 180 TABLET | Refills: 1 | Status: SHIPPED | OUTPATIENT
Start: 2021-12-20

## 2021-12-20 RX ORDER — PREDNISOLONE 5 MG/1
TABLET ORAL
COMMUNITY

## 2021-12-20 ASSESSMENT — MIFFLIN-ST. JEOR: SCORE: 1307.24

## 2021-12-20 NOTE — PATIENT INSTRUCTIONS
1. STOP furosemide  2. START torsemide 40mg (2 tablets daily)  3. Get blood drawn next week to monitor kidney function  4. Follow up with [facility] 1/14/21  5. Please call with any questions/concerns [phone]    Component      Latest Ref Rng & Units 9/23/2021 12/20/2021   Sodium      133 - 144 mmol/L 137 137   Potassium      3.4 - 5.3 mmol/L 5.0 5.0   Chloride      94 - 109 mmol/L 104 104   Carbon Dioxide      20 - 32 mmol/L 27 27   Anion Gap      3 - 14 mmol/L 6 6   Urea Nitrogen      7 - 30 mg/dL 19 27   Creatinine      0.66 - 1.25 mg/dL 0.94 1.13   Calcium      8.5 - 10.1 mg/dL 8.5 9.1   Glucose      70 - 99 mg/dL 127 (H) 130 (H)   GFR Estimate      >60 mL/min/1.73m2 74 59 (L)

## 2021-12-20 NOTE — LETTER
12/20/2021    Trinity Brown PA-C  [facility] [address] [place] [address]    RE: Thierry Samuel       Dear Colleague,     I had the pleasure of seeing Thierry Samuel in the [facility].  Cardiology Clinic Progress Note  Thierry Samuel MRN# [id number]   YOB: 1935 Age: 86 year old   Primary Cardiologist: Dr. Rod  Reason for visit: Cardiology follow up            Assessment and Plan:   Thierry Samuel is a very pleasant 86 year old male with a history of atrial fibrillation on warfarin, s/p PPM, hypertension, hyperlipidemia, HFpEF (most likely right HF/pulmonary hypertension), COPD on 3L NC PRN, SUSY on CPAP and history of GI bleed.     1.  Acute on chronic HFpEF/Right heart failure - EF 60-65%, RV mild to moderately dilated with normal RV systolic function.    - NYHA class III, stage C   - Fluid status : hypervolemic   - Diuretic regimen : change furosemide to torsemide 40mg daily will titrate as tolerated.   - Aldosterone antagonist : spironolactone 25mg daily   2. Lower extremity edema - multifactorial but large factor related to #1 (HF)  3. Chronic atrial fibrillation - stable, rate controlled on warfarin for thromobembolic prophylaxis  4. S/p PPM    - Continue routine device clinic follow up  5. Hypertension  6. Hyperlipidemia  7. COPD   8. SUSY     I met Thierry and his daughter today for the first time for cardiology follow up. He has complaints of LE edema which has progressively worsened. He has been compliant with diuretics and no significant change diet. There is likely a component of obesity related venous insufficiency. Reviewed options of increasing furosemide or changing to torsemide. Collaboratively we decided to change to torsemide. Will have BMP checked in 10 days and follow up in 3-4 weeks. Reinforced lifestyle modifications including limiting salt and fluid. He is unable to tolerate compression stockings.  Support given today. All questions answered.      Changes today: CHANGE furosemide to torsemide 40mg daily     Follow up plan:     BMP in 10 days    Follow up with me on 1/14/21        History of Presenting Illness:    Thierry Samuel is a very pleasant 86 year old male with a history of atrial fibrillation on warfarin, s/p PPM, hypertension, hyperlipidemia, HFpEF (most likely right HF/pulmonary hypertension), COPD on 3L NC, SUSY on CPAP and history of GI bleed.     He established care with Dr. Rod in August at which time he was noted to have significant LE edema, which was suspected to be related to right heart failure. He was started on spironolactone 25mg daily. During follow up weight was down and edema had improved, but he was experiencing cramps in legs/hands his furosemide 40mg daily was stopped.    He was unfortunately hospitalized due to COPD exacerbation and likely adrenal insufficiency from steroid withdrawal.      During his last cardiology visit in September he was continued on same medications with plans to keep a close eye on potassium.     Patient is here today for cardiology follow up. Daughter present and translating for patient, declined .     Patient reports feeling good. Monitoring weights every few days at home. Typically checking at night after a shower. Reports weight stable ~168#. Daughter notes initially after starting spironolactone LE edema had significantly improved, notes now back to how it was. Denies shortness of breath at rest. Some exertional dyspnea, which he feels at baseline. Sleeping with HOB elevated on pillows and uses CPAP. Denies abdominal distention/bloating. Denies chest pain or chest tightness. Denies dizziness, lightheadedness or other presyncopal symptoms. Denies tachycardia or palpitations. Unable to tolerate compression stockings. Taking medications daily.     Labs today show showed stable kidney function, creatinine 1.13. Potassium 5. Blood pressure  122/58 and HR 62 in clinic today.    Appetite good. Eating meals at home. Limiting sodium intake. No set exercise routine, notes had been walking during the summer months, a few times a week walking at the mall for about 1 hr. Denies tobacco use. Denies alcohol use.         Social History      Social History     Socioeconomic History     Marital status:      Spouse name: Not on file     Number of children: Not on file     Years of education: Not on file     Highest education level: Not on file   Occupational History     Not on file   Tobacco Use     Smoking status: Former Smoker     Types: Cigarettes     Smokeless tobacco: Never Used   Vaping Use     Vaping Use: Never used   Substance and Sexual Activity     Alcohol use: Not Currently     Comment: social     Drug use: No     Sexual activity: Not Currently   Other Topics Concern     Parent/sibling w/ CABG, MI or angioplasty before 65F 55M? Not Asked   Social History Narrative     Not on file     Social Determinants of Health     Financial Resource Strain: Not on file   Food Insecurity: Not on file   Transportation Needs: Not on file   Physical Activity: Not on file   Stress: Not on file   Social Connections: Not on file   Intimate Partner Violence: Not on file   Housing Stability: Not on file            Review of Systems:   Skin:        Eyes:       ENT:       Respiratory:       Cardiovascular:       Gastroenterology:      Genitourinary:       Musculoskeletal:       Neurologic:       Psychiatric:       Heme/Lymph/Imm:       Endocrine:              Physical Exam:   Vitals: There were no vitals taken for this visit.   Wt Readings from Last 4 Encounters:   09/29/21 76.7 kg (169 lb)   09/23/21 77.1 kg (170 lb)   09/06/21 78.8 kg (173 lb 11.2 oz)   09/03/21 77.8 kg (171 lb 9.6 oz)     GEN: well nourished, in no acute distress.  HEENT:  Pupils equal, round. Sclerae nonicteric.   NECK: Supple, no masses appreciated  C/V:  Irregularly irregular rate and rhythm   RESP:  Respirations are unlabored. Clear to auscultation bilaterally without wheezing, rales, or rhonchi.  GI: Abdomen soft, nontender.  EXTREM: +2-3 LE edema.  NEURO: Alert and oriented, cooperative.  SKIN: Warm and dry.        Data:     LIVER ENZYME RESULTS:  Lab Results   Component Value Date    AST 19 09/04/2021    AST 16 05/26/2021    ALT 23 09/04/2021    ALT 24 05/26/2021     CBC RESULTS:  Lab Results   Component Value Date    WBC 5.0 09/05/2021    WBC 9.2 05/27/2021    RBC 4.28 (L) 09/05/2021    RBC 4.22 (L) 05/27/2021    HGB 13.1 (L) 09/05/2021    HGB 13.3 05/27/2021    HCT 40.6 09/05/2021    HCT 41.2 05/27/2021    MCV 95 09/05/2021    MCV 98 05/27/2021    MCH 30.6 09/05/2021    MCH 31.5 05/27/2021    MCHC 32.3 09/05/2021    MCHC 32.3 05/27/2021    RDW 13.2 09/05/2021    RDW 13.2 05/27/2021     09/05/2021     05/27/2021     BMP RESULTS:  Lab Results   Component Value Date     09/23/2021     05/28/2021    POTASSIUM 5.0 09/23/2021    POTASSIUM 3.8 05/28/2021    CHLORIDE 104 09/23/2021    CHLORIDE 104 05/28/2021    CO2 27 09/23/2021    CO2 30 05/28/2021    ANIONGAP 6 09/23/2021    ANIONGAP 4 05/28/2021     (H) 09/23/2021    GLC 99 05/28/2021    BUN 19 09/23/2021    BUN 26 05/28/2021    CR 0.94 09/23/2021    CR 0.78 05/28/2021    GFRESTIMATED 74 09/23/2021    GFRESTIMATED 82 05/28/2021    GFRESTBLACK >90 05/28/2021    CAMPOS 8.5 09/23/2021    CAMPOS 8.3 (L) 05/28/2021      A1C RESULTS:  Lab Results   Component Value Date    A1C 5.6 08/11/2021     INR RESULTS:  Lab Results   Component Value Date    INR 3.45 (H) 09/06/2021    INR 3.45 (H) 09/05/2021    INR 1.70 (H) 05/28/2021    INR 1.43 (H) 05/27/2021            Medications     Current Outpatient Medications   Medication Sig Dispense Refill     ALBUTEROL IN Inhale 6 puffs into the lungs every 8 hours 6 puffs q8h scheduled (Butovent/Salbutamol)       atorvastatin (LIPITOR) 10 MG tablet Take 10 mg by mouth daily        carbamide peroxide  (DEBROX) 6.5 % otic solution Place 5 drops into the right ear daily as needed for other (wax) 100 mL 0     furosemide (LASIX) 80 MG tablet Take 80 mg by mouth every morning       hypromellose-dextran (ARTIFICAL TEARS) 0.1-0.3 % SOLN ophthalmic solution Apply 2-3 drops to eye every hour as needed for dry eyes 30 mL 0     ipratropium - albuterol 0.5 mg/2.5 mg/3 mL (DUONEB) 0.5-2.5 (3) MG/3ML neb solution Take 1 vial by nebulization every 6 hours as needed for shortness of breath / dyspnea or wheezing       montelukast (SINGULAIR) 10 MG tablet Take 10 mg by mouth every evening       omeprazole (PRILOSEC) 20 MG DR capsule Take 20 mg by mouth 2 times daily       predniSONE (DELTASONE) 10 MG tablet 4 tabs daily for 3 days, then 3 tabs daily for 3 days, then 2 tabs daily for 3 days, then 1 tab daily for 3 days, then stop (Patient taking differently: Taking 5 mg, 4 tabs daily for 3 days, then 3 tabs daily for 3 days, then 2 tabs daily for 3 days, then 1 tab daily for 3 days, then stop) 30 tablet 0     spironolactone (ALDACTONE) 25 MG tablet Take 1 tablet (25 mg) by mouth daily 90 tablet 3     tiotropium (SPIRIVA) 18 MCG inhalation capsule Inhale 18 mcg into the lungs daily       UNABLE TO FIND Inhale 2 puffs into the lungs 2 times daily MEDICATION NAME: Serflo (salmeterol xinafoate 25 mcg fluticasone propionate 250 mcg)  Inhaler 2 puffs bid.       warfarin ANTICOAGULANT (COUMADIN) 2.5 MG tablet Skip your coumadin dose the evening of discharge, then take 2.5 mg on Tuesday and Wednesday, and have your INR checked Thursday as scheduled. (Patient taking differently: 5mg on Mon and Wed, 2.5mg the rest of the week.  Next INR 9/17)            Past Medical History     Past Medical History:   Diagnosis Date     A-fib (H)     s/p pacemaker     Cardiac pacemaker in situ      CHF (congestive heart failure) (H) 09/27/2018     COPD (chronic obstructive pulmonary disease) (H)      GI bleed      Hypertension      Long term current use of  anticoagulant therapy      Moderate to severe pulmonary hypertension (H)      SUSY (obstructive sleep apnea)      SUSY (obstructive sleep apnea)      Past Surgical History:   Procedure Laterality Date     APPENDECTOMY       CARDIAC SURGERY       Family History   Problem Relation Age of Onset     No Known Problems Mother      No Known Problems Father             Allergies   Patient has no known allergies.        VERONIQUE Kimball CNP  [facility]  Pager: [phone]        Thank you for allowing me to participate in the care of your patient.      Sincerely,     VERONIQUE Kimball CNP     [facility]  cc:   Sarah Lehman PA-C  [facility]  [id number] [place] [address]

## 2021-12-20 NOTE — PROGRESS NOTES
Cardiology Clinic Progress Note  Thierry Samuel MRN# [id number]   YOB: 1935 Age: 86 year old   Primary Cardiologist: Dr. Rod  Reason for visit: Cardiology follow up            Assessment and Plan:   Thierry Samuel is a very pleasant 86 year old male with a history of atrial fibrillation on warfarin, s/p PPM, hypertension, hyperlipidemia, HFpEF (most likely right HF/pulmonary hypertension), COPD on 3L NC PRN, SUSY on CPAP and history of GI bleed.     1.  Acute on chronic HFpEF/Right heart failure - EF 60-65%, RV mild to moderately dilated with normal RV systolic function.    - NYHA class III, stage C   - Fluid status : hypervolemic   - Diuretic regimen : change furosemide to torsemide 40mg daily will titrate as tolerated.   - Aldosterone antagonist : spironolactone 25mg daily   2. Lower extremity edema - multifactorial but large factor related to #1 (HF)  3. Chronic atrial fibrillation - stable, rate controlled on warfarin for thromobembolic prophylaxis  4. S/p PPM    - Continue routine device clinic follow up  5. Hypertension  6. Hyperlipidemia  7. COPD   8. SUSY     I met Thierry and his daughter today for the first time for cardiology follow up. He has complaints of LE edema which has progressively worsened. He has been compliant with diuretics and no significant change diet. There is likely a component of obesity related venous insufficiency. Reviewed options of increasing furosemide or changing to torsemide. Collaboratively we decided to change to torsemide. Will have BMP checked in 10 days and follow up in 3-4 weeks. Reinforced lifestyle modifications including limiting salt and fluid. He is unable to tolerate compression stockings. Support given today. All questions answered.      Changes today: CHANGE furosemide to torsemide 40mg daily     Follow up plan:     BMP in 10 days    Follow up with me on 1/14/21        History of Presenting Illness:    Thierry Samuel is a very  pleasant 86 year old male with a history of atrial fibrillation on warfarin, s/p PPM, hypertension, hyperlipidemia, HFpEF (most likely right HF/pulmonary hypertension), COPD on 3L NC, SUSY on CPAP and history of GI bleed.     He established care with Dr. Rod in August at which time he was noted to have significant LE edema, which was suspected to be related to right heart failure. He was started on spironolactone 25mg daily. During follow up weight was down and edema had improved, but he was experiencing cramps in legs/hands his furosemide 40mg daily was stopped.    He was unfortunately hospitalized due to COPD exacerbation and likely adrenal insufficiency from steroid withdrawal.      During his last cardiology visit in September he was continued on same medications with plans to keep a close eye on potassium.     Patient is here today for cardiology follow up. Daughter present and translating for patient, declined .     Patient reports feeling good. Monitoring weights every few days at home. Typically checking at night after a shower. Reports weight stable ~168#. Daughter notes initially after starting spironolactone LE edema had significantly improved, notes now back to how it was. Denies shortness of breath at rest. Some exertional dyspnea, which he feels at baseline. Sleeping with HOB elevated on pillows and uses CPAP. Denies abdominal distention/bloating. Denies chest pain or chest tightness. Denies dizziness, lightheadedness or other presyncopal symptoms. Denies tachycardia or palpitations. Unable to tolerate compression stockings. Taking medications daily.     Labs today show showed stable kidney function, creatinine 1.13. Potassium 5. Blood pressure 122/58 and HR 62 in clinic today.    Appetite good. Eating meals at home. Limiting sodium intake. No set exercise routine, notes had been walking during the summer months, a few times a week walking at the mall for about 1 hr. Denies tobacco use.  Denies alcohol use.         Social History      Social History     Socioeconomic History     Marital status:      Spouse name: Not on file     Number of children: Not on file     Years of education: Not on file     Highest education level: Not on file   Occupational History     Not on file   Tobacco Use     Smoking status: Former Smoker     Types: Cigarettes     Smokeless tobacco: Never Used   Vaping Use     Vaping Use: Never used   Substance and Sexual Activity     Alcohol use: Not Currently     Comment: social     Drug use: No     Sexual activity: Not Currently   Other Topics Concern     Parent/sibling w/ CABG, MI or angioplasty before 65F 55M? Not Asked   Social History Narrative     Not on file     Social Determinants of Health     Financial Resource Strain: Not on file   Food Insecurity: Not on file   Transportation Needs: Not on file   Physical Activity: Not on file   Stress: Not on file   Social Connections: Not on file   Intimate Partner Violence: Not on file   Housing Stability: Not on file            Review of Systems:   Skin:        Eyes:       ENT:       Respiratory:       Cardiovascular:       Gastroenterology:      Genitourinary:       Musculoskeletal:       Neurologic:       Psychiatric:       Heme/Lymph/Imm:       Endocrine:              Physical Exam:   Vitals: There were no vitals taken for this visit.   Wt Readings from Last 4 Encounters:   09/29/21 76.7 kg (169 lb)   09/23/21 77.1 kg (170 lb)   09/06/21 78.8 kg (173 lb 11.2 oz)   09/03/21 77.8 kg (171 lb 9.6 oz)     GEN: well nourished, in no acute distress.  HEENT:  Pupils equal, round. Sclerae nonicteric.   NECK: Supple, no masses appreciated  C/V:  Irregularly irregular rate and rhythm   RESP: Respirations are unlabored. Clear to auscultation bilaterally without wheezing, rales, or rhonchi.  GI: Abdomen soft, nontender.  EXTREM: +2-3 LE edema.  NEURO: Alert and oriented, cooperative.  SKIN: Warm and dry.        Data:     LIVER ENZYME  RESULTS:  Lab Results   Component Value Date    AST 19 09/04/2021    AST 16 05/26/2021    ALT 23 09/04/2021    ALT 24 05/26/2021     CBC RESULTS:  Lab Results   Component Value Date    WBC 5.0 09/05/2021    WBC 9.2 05/27/2021    RBC 4.28 (L) 09/05/2021    RBC 4.22 (L) 05/27/2021    HGB 13.1 (L) 09/05/2021    HGB 13.3 05/27/2021    HCT 40.6 09/05/2021    HCT 41.2 05/27/2021    MCV 95 09/05/2021    MCV 98 05/27/2021    MCH 30.6 09/05/2021    MCH 31.5 05/27/2021    MCHC 32.3 09/05/2021    MCHC 32.3 05/27/2021    RDW 13.2 09/05/2021    RDW 13.2 05/27/2021     09/05/2021     05/27/2021     BMP RESULTS:  Lab Results   Component Value Date     09/23/2021     05/28/2021    POTASSIUM 5.0 09/23/2021    POTASSIUM 3.8 05/28/2021    CHLORIDE 104 09/23/2021    CHLORIDE 104 05/28/2021    CO2 27 09/23/2021    CO2 30 05/28/2021    ANIONGAP 6 09/23/2021    ANIONGAP 4 05/28/2021     (H) 09/23/2021    GLC 99 05/28/2021    BUN 19 09/23/2021    BUN 26 05/28/2021    CR 0.94 09/23/2021    CR 0.78 05/28/2021    GFRESTIMATED 74 09/23/2021    GFRESTIMATED 82 05/28/2021    GFRESTBLACK >90 05/28/2021    CAMPOS 8.5 09/23/2021    CAMPOS 8.3 (L) 05/28/2021      A1C RESULTS:  Lab Results   Component Value Date    A1C 5.6 08/11/2021     INR RESULTS:  Lab Results   Component Value Date    INR 3.45 (H) 09/06/2021    INR 3.45 (H) 09/05/2021    INR 1.70 (H) 05/28/2021    INR 1.43 (H) 05/27/2021            Medications     Current Outpatient Medications   Medication Sig Dispense Refill     ALBUTEROL IN Inhale 6 puffs into the lungs every 8 hours 6 puffs q8h scheduled (Butovent/Salbutamol)       atorvastatin (LIPITOR) 10 MG tablet Take 10 mg by mouth daily        carbamide peroxide (DEBROX) 6.5 % otic solution Place 5 drops into the right ear daily as needed for other (wax) 100 mL 0     furosemide (LASIX) 80 MG tablet Take 80 mg by mouth every morning       hypromellose-dextran (ARTIFICAL TEARS) 0.1-0.3 % SOLN ophthalmic solution  Apply 2-3 drops to eye every hour as needed for dry eyes 30 mL 0     ipratropium - albuterol 0.5 mg/2.5 mg/3 mL (DUONEB) 0.5-2.5 (3) MG/3ML neb solution Take 1 vial by nebulization every 6 hours as needed for shortness of breath / dyspnea or wheezing       montelukast (SINGULAIR) 10 MG tablet Take 10 mg by mouth every evening       omeprazole (PRILOSEC) 20 MG DR capsule Take 20 mg by mouth 2 times daily       predniSONE (DELTASONE) 10 MG tablet 4 tabs daily for 3 days, then 3 tabs daily for 3 days, then 2 tabs daily for 3 days, then 1 tab daily for 3 days, then stop (Patient taking differently: Taking 5 mg, 4 tabs daily for 3 days, then 3 tabs daily for 3 days, then 2 tabs daily for 3 days, then 1 tab daily for 3 days, then stop) 30 tablet 0     spironolactone (ALDACTONE) 25 MG tablet Take 1 tablet (25 mg) by mouth daily 90 tablet 3     tiotropium (SPIRIVA) 18 MCG inhalation capsule Inhale 18 mcg into the lungs daily       UNABLE TO FIND Inhale 2 puffs into the lungs 2 times daily MEDICATION NAME: Serflo (salmeterol xinafoate 25 mcg fluticasone propionate 250 mcg)  Inhaler 2 puffs bid.       warfarin ANTICOAGULANT (COUMADIN) 2.5 MG tablet Skip your coumadin dose the evening of discharge, then take 2.5 mg on Tuesday and Wednesday, and have your INR checked Thursday as scheduled. (Patient taking differently: 5mg on Mon and Wed, 2.5mg the rest of the week.  Next INR 9/17)            Past Medical History     Past Medical History:   Diagnosis Date     A-fib (H)     s/p pacemaker     Cardiac pacemaker in situ      CHF (congestive heart failure) (H) 09/27/2018     COPD (chronic obstructive pulmonary disease) (H)      GI bleed      Hypertension      Long term current use of anticoagulant therapy      Moderate to severe pulmonary hypertension (H)      SUSY (obstructive sleep apnea)      SUSY (obstructive sleep apnea)      Past Surgical History:   Procedure Laterality Date     APPENDECTOMY       CARDIAC SURGERY       Family  History   Problem Relation Age of Onset     No Known Problems Mother      No Known Problems Father             Allergies   Patient has no known allergies.        VERONIQUE Kimball CNP  [facility]  Pager: [phone]

## 2021-12-28 ENCOUNTER — LAB (OUTPATIENT)
Dept: LAB | Facility: CLINIC | Age: 86
End: 2021-12-28
Payer: MEDICAID

## 2021-12-28 DIAGNOSIS — I50.812 CHRONIC RIGHT-SIDED HEART FAILURE (H): ICD-10-CM

## 2021-12-28 DIAGNOSIS — R60.0 BILATERAL LOWER EXTREMITY EDEMA: ICD-10-CM

## 2021-12-28 LAB
ANION GAP SERPL CALCULATED.3IONS-SCNC: 3 MMOL/L (ref 3–14)
BUN SERPL-MCNC: 28 MG/DL (ref 7–30)
CALCIUM SERPL-MCNC: 9 MG/DL (ref 8.5–10.1)
CHLORIDE BLD-SCNC: 102 MMOL/L (ref 94–109)
CO2 SERPL-SCNC: 30 MMOL/L (ref 20–32)
CREAT SERPL-MCNC: 1.07 MG/DL (ref 0.66–1.25)
GFR SERPL CREATININE-BSD FRML MDRD: 68 ML/MIN/1.73M2
GLUCOSE BLD-MCNC: 170 MG/DL (ref 70–99)
POTASSIUM BLD-SCNC: 4.4 MMOL/L (ref 3.4–5.3)
SODIUM SERPL-SCNC: 135 MMOL/L (ref 133–144)

## 2021-12-28 PROCEDURE — 36415 COLL VENOUS BLD VENIPUNCTURE: CPT | Performed by: NURSE PRACTITIONER

## 2021-12-28 PROCEDURE — 80048 BASIC METABOLIC PNL TOTAL CA: CPT | Performed by: NURSE PRACTITIONER

## 2021-12-29 ENCOUNTER — CARE COORDINATION (OUTPATIENT)
Dept: CARDIOLOGY | Facility: CLINIC | Age: 86
End: 2021-12-29
Payer: MEDICAID

## 2021-12-29 NOTE — PROGRESS NOTES
Symone Solorio, APRN CNP   12/28/2021  8:42 PM CST         BMP stable after changing from furosemide to torsemide. Keep CORE follow up for 1/14/21     CORE - can you please call patient update on lab results and follow up on weight/HF symptoms. Thanks, Symone

## 2021-12-29 NOTE — PROGRESS NOTES
"Used  to call pt.   We reviewed BMP results. He is weighing EOD. The swelling in his legs is \"almost gone\". He does feel the same. He is tired. He only wears his O2 when he does a lot of activity. He is still dyspneic with \"a lot of activity\". Unsure if it is better. He is working on his diet.   He is c/o cramping to his hands and feet. He always is having \"right kidney pain\". No issues with urinating or constipation. He is voiding large amounts.    Wts:   12/28 - 165 lbs  12/26 - 164 lbs    12/20 - 168 lbs     Update to Symone Robledo RN 4:21 PM 12/29/21      "

## 2022-02-18 ENCOUNTER — OFFICE VISIT (OUTPATIENT)
Dept: CARDIOLOGY | Facility: CLINIC | Age: 87
End: 2022-02-18
Payer: MEDICAID

## 2022-02-18 VITALS
OXYGEN SATURATION: 98 % | HEIGHT: 60 IN | BODY MASS INDEX: 32.28 KG/M2 | WEIGHT: 164.4 LBS | DIASTOLIC BLOOD PRESSURE: 58 MMHG | SYSTOLIC BLOOD PRESSURE: 106 MMHG | HEART RATE: 80 BPM

## 2022-02-18 DIAGNOSIS — I48.20 CHRONIC ATRIAL FIBRILLATION (H): ICD-10-CM

## 2022-02-18 DIAGNOSIS — I10 ESSENTIAL HYPERTENSION: ICD-10-CM

## 2022-02-18 DIAGNOSIS — I50.812 CHRONIC RIGHT-SIDED HEART FAILURE (H): ICD-10-CM

## 2022-02-18 DIAGNOSIS — I50.30 NYHA CLASS 3 HEART FAILURE WITH PRESERVED EJECTION FRACTION (H): Primary | ICD-10-CM

## 2022-02-18 DIAGNOSIS — G47.33 OSA (OBSTRUCTIVE SLEEP APNEA): ICD-10-CM

## 2022-02-18 PROCEDURE — 99214 OFFICE O/P EST MOD 30 MIN: CPT | Performed by: NURSE PRACTITIONER

## 2022-02-18 NOTE — PATIENT INSTRUCTIONS
1. No medication changes  2. Follow up with Symone in 3 months with labs prior  3. Please call with any questions/concerns [phone]

## 2022-02-18 NOTE — LETTER
2/18/2022    Trinity Brown PA-C  [facility] [address] [place] [address]    RE: Thierry Samuel       Dear Colleague,     I had the pleasure of seeing Thierry Samuel in the [facility].  Cardiology Clinic Progress Note  Thierry Samuel MRN# [id number]   YOB: 1935 Age: 87 year old   Primary Cardiologist: Dr. Rod  Reason for visit: Cardiology follow up             Assessment and Plan:   Thierry Samuel is a very pleasant 87 year old male with a history of atrial fibrillation on warfarin, s/p PPM, hypertension, hyperlipidemia, HFpEF (most likely right HF/pulmonary hypertension), COPD on 3L NC PRN, SUSY on CPAP and history of GI bleed.      1.  Chronic HFpEF/Right heart failure - EF 60-65%, RV mild to moderately dilated with normal RV systolic function.               - NYHA class III, stage C              - Fluid status : euvolemic               - Diuretic regimen : torsemide 40mg daily              - Aldosterone antagonist : spironolactone 25mg daily   2. Lower extremity edema - multifactorial but large factor related to #1 (HF), significant improvement since change in diuretic.   3. Chronic atrial fibrillation - stable, rate controlled on warfarin for thromobembolic prophylaxis  4. S/p PPM               - Continue routine device clinic follow up  5. Hypertension  6. Hyperlipidemia  7. COPD   8. SUSY     I saw Thierry and his daughter today for cardiology followup. He is doing well from a heart failure standpoint. Weight is down 6# since our visit in December with significant improvement in LE edema. Patient/daughter do note some slight increase in edema the past few weeks but on exam today well controlled. Recommend continuing torsemide 40mg daily. Reinforced low sodium diet and 2L fluid restriction. Encouraged to call with questions/concerns.     Changes today: none    Follow up plan:     Cardiology follow up in 3 months with  "labs prior         History of Presenting Illness:    Thierry Samuel is a very pleasant 87 year old male with a history of atrial fibrillation on warfarin, s/p PPM, hypertension, hyperlipidemia, HFpEF (most likely right HF/pulmonary hypertension), COPD on 3L NC, SUSY on CPAP and history of GI bleed.      He established care with Dr. Rod in August at which time he was noted to have significant LE edema, which was suspected to be related to right heart failure. He was started on spironolactone 25mg daily. During follow up weight was down and edema had improved, but he was experiencing cramps in legs/hands his furosemide 40mg daily was stopped.     He was unfortunately hospitalized due to COPD exacerbation and likely adrenal insufficiency from steroid withdrawal.       I met patient and his daughter for the first time in December at which time he was volume overloaded with worsening LE edema. His furosemide was changed to torsemide 40mg daily. Follow up BMP showed stable kidney function and electrolytes. He has since missed cardiology follow up.     Patient is here today for cardiology follow up. Daughter with for visit and translating, declined .     Patient reports feeling good. No monitoring home weight consistently at home, clinic weight down 6# since last visit. So initially after starting torsemide noted a significant improvement in LE edema and now the past few weeks has had some increase in swelling but still better then it had been. Denies any changes with diet/liquids. Denies shortness of breath at rest. Some exertional dyspnea, which he feels is \"about the same\". Also has a chronic cough with no change.  Denies orthopnea or PND. Using CPAP nightly. Denies chest pain or chest tightness. Denies dizziness, lightheadedness or other presyncopal symptoms. Denies tachycardia or palpitations. Taking medications daily.     No labs today. Blood pressure 106/58 and HR 80 in clinic today.    Appetite " good. Eating meals at home. Limiting sodium intake. No set exercise routine, notes had been walking during the summer months, a few times a week walking at the mall for about 1 hr. Denies tobacco use. Denies alcohol use.         Social History      Social History     Socioeconomic History     Marital status:      Spouse name: Not on file     Number of children: Not on file     Years of education: Not on file     Highest education level: Not on file   Occupational History     Not on file   Tobacco Use     Smoking status: Former Smoker     Types: Cigarettes     Smokeless tobacco: Never Used   Vaping Use     Vaping Use: Never used   Substance and Sexual Activity     Alcohol use: Not Currently     Comment: social     Drug use: No     Sexual activity: Not Currently   Other Topics Concern     Parent/sibling w/ CABG, MI or angioplasty before 65F 55M? Not Asked   Social History Narrative     Not on file     Social Determinants of Health     Financial Resource Strain: Not on file   Food Insecurity: Not on file   Transportation Needs: Not on file   Physical Activity: Not on file   Stress: Not on file   Social Connections: Not on file   Intimate Partner Violence: Not on file   Housing Stability: Not on file            Review of Systems:   Skin:  Positive for pigmentation   Eyes:  Positive for visual blurring  ENT:  Positive for hearing loss  Respiratory:  Positive for dyspnea on exertion;cough;sleep apnea;CPAP  Cardiovascular:    Positive for;edema  Gastroenterology: Negative    Genitourinary:  Negative    Musculoskeletal:  Positive for back pain;neck pain  Neurologic:  Positive for numbness or tingling of hands  Psychiatric:  Negative    Heme/Lymph/Imm:  Negative    Endocrine:  Negative           Physical Exam:   Vitals: /58   Pulse 80   Ht 1.524 m (5')   Wt 74.6 kg (164 lb 6.4 oz)   SpO2 98%   BMI 32.11 kg/m     Wt Readings from Last 4 Encounters:   02/18/22 74.6 kg (164 lb 6.4 oz)   12/20/21 77.5 kg (170  lb 12.8 oz)   09/29/21 76.7 kg (169 lb)   09/23/21 77.1 kg (170 lb)     GEN: well nourished, in no acute distress.  HEENT:  Pupils equal, round. Sclerae nonicteric.   NECK: Supple, no masses appreciated. JVP appears normal.   C/V:  Irregularly irregular rate and rhythm, no murmur, rub or gallop.   RESP: Respirations are unlabored. Clear to auscultation bilaterally without wheezing, rales, or rhonchi.  GI: Abdomen soft, nontender.  EXTREM: Trace bilateral LE edema, venous stasis skin changes.  NEURO: Alert and oriented, cooperative.  SKIN: Warm and dry.       Data:   LIVER ENZYME RESULTS:  Lab Results   Component Value Date    AST 19 09/04/2021    AST 16 05/26/2021    ALT 23 09/04/2021    ALT 24 05/26/2021     CBC RESULTS:  Lab Results   Component Value Date    WBC 5.0 09/05/2021    WBC 9.2 05/27/2021    RBC 4.28 (L) 09/05/2021    RBC 4.22 (L) 05/27/2021    HGB 13.1 (L) 09/05/2021    HGB 13.3 05/27/2021    HCT 40.6 09/05/2021    HCT 41.2 05/27/2021    MCV 95 09/05/2021    MCV 98 05/27/2021    MCH 30.6 09/05/2021    MCH 31.5 05/27/2021    MCHC 32.3 09/05/2021    MCHC 32.3 05/27/2021    RDW 13.2 09/05/2021    RDW 13.2 05/27/2021     09/05/2021     05/27/2021     BMP RESULTS:  Lab Results   Component Value Date     12/28/2021     05/28/2021    POTASSIUM 4.4 12/28/2021    POTASSIUM 3.8 05/28/2021    CHLORIDE 102 12/28/2021    CHLORIDE 104 05/28/2021    CO2 30 12/28/2021    CO2 30 05/28/2021    ANIONGAP 3 12/28/2021    ANIONGAP 4 05/28/2021     (H) 12/28/2021    GLC 99 05/28/2021    BUN 28 12/28/2021    BUN 26 05/28/2021    CR 1.07 12/28/2021    CR 0.78 05/28/2021    GFRESTIMATED 68 12/28/2021    GFRESTIMATED 82 05/28/2021    GFRESTBLACK >90 05/28/2021    CAMPOS 9.0 12/28/2021    CAMPOS 8.3 (L) 05/28/2021      A1C RESULTS:  Lab Results   Component Value Date    A1C 5.6 08/11/2021     INR RESULTS:  Lab Results   Component Value Date    INR 3.45 (H) 09/06/2021    INR 3.45 (H) 09/05/2021    INR  1.70 (H) 05/28/2021    INR 1.43 (H) 05/27/2021            Medications     Current Outpatient Medications   Medication Sig Dispense Refill     ALBUTEROL IN Inhale 6 puffs into the lungs every 8 hours 6 puffs q8h scheduled (Butovent/Salbutamol)       atorvastatin (LIPITOR) 10 MG tablet Take 10 mg by mouth daily        hypromellose-dextran (ARTIFICAL TEARS) 0.1-0.3 % SOLN ophthalmic solution Apply 2-3 drops to eye every hour as needed for dry eyes 30 mL 0     ipratropium - albuterol 0.5 mg/2.5 mg/3 mL (DUONEB) 0.5-2.5 (3) MG/3ML neb solution Take 1 vial by nebulization every 6 hours as needed for shortness of breath / dyspnea or wheezing       montelukast (SINGULAIR) 10 MG tablet Take 10 mg by mouth every evening       omeprazole (PRILOSEC) 20 MG DR capsule Take 20 mg by mouth 2 times daily       POTASSIUM CHLORIDE PO Take by mouth daily as needed Dosage unknown - takes PRN when he's having leg cramps       prednisoLONE 5 MG tablet Take 5 mg by mouth daily 1 tab/5 mg and 0.5 tab/2.5 mg alternating QOD       salmeterol (SEREVENT) 50 MCG/DOSE inhaler Inhale 2 puffs into the lungs 2 times daily       spironolactone (ALDACTONE) 25 MG tablet Take 1 tablet (25 mg) by mouth daily 90 tablet 3     tiotropium (SPIRIVA) 18 MCG inhalation capsule Inhale 18 mcg into the lungs daily       torsemide (DEMADEX) 20 MG tablet Take 2 tablets (40 mg) by mouth daily 180 tablet 1     warfarin ANTICOAGULANT (COUMADIN) 2.5 MG tablet Skip your coumadin dose the evening of discharge, then take 2.5 mg on Tuesday and Wednesday, and have your INR checked Thursday as scheduled. (Patient taking differently: 5mg on Mon and Wed, 2.5mg the rest of the week.  Next INR 9/17)            Past Medical History     Past Medical History:   Diagnosis Date     A-fib (H)     s/p pacemaker     Cardiac pacemaker in situ      CHF (congestive heart failure) (H) 09/27/2018     COPD (chronic obstructive pulmonary disease) (H)      GI bleed      Hypertension      Long  term current use of anticoagulant therapy      Moderate to severe pulmonary hypertension (H)      SUSY (obstructive sleep apnea)      SUSY (obstructive sleep apnea)      Past Surgical History:   Procedure Laterality Date     APPENDECTOMY       CARDIAC SURGERY       Family History   Problem Relation Age of Onset     No Known Problems Mother      No Known Problems Father             Allergies   Patient has no known allergies.    30 minutes spent on the date of the encounter doing chart review, history and exam, documentation and further activities as noted above      VERONIQUE Kimball CNP  [facility]  Pager: [phone]

## 2022-02-18 NOTE — PROGRESS NOTES
Cardiology Clinic Progress Note  Thierry Samuel MRN# [id number]   YOB: 1935 Age: 87 year old   Primary Cardiologist: Dr. Rod  Reason for visit: Cardiology follow up             Assessment and Plan:   Thierry Samuel is a very pleasant 87 year old male with a history of atrial fibrillation on warfarin, s/p PPM, hypertension, hyperlipidemia, HFpEF (most likely right HF/pulmonary hypertension), COPD on 3L NC PRN, SUSY on CPAP and history of GI bleed.      1.  Chronic HFpEF/Right heart failure - EF 60-65%, RV mild to moderately dilated with normal RV systolic function.               - NYHA class III, stage C              - Fluid status : euvolemic               - Diuretic regimen : torsemide 40mg daily              - Aldosterone antagonist : spironolactone 25mg daily   2. Lower extremity edema - multifactorial but large factor related to #1 (HF), significant improvement since change in diuretic.   3. Chronic atrial fibrillation - stable, rate controlled on warfarin for thromobembolic prophylaxis  4. S/p PPM               - Continue routine device clinic follow up  5. Hypertension  6. Hyperlipidemia  7. COPD   8. SUSY     I saw Thierry and his daughter today for cardiology followup. He is doing well from a heart failure standpoint. Weight is down 6# since our visit in December with significant improvement in LE edema. Patient/daughter do note some slight increase in edema the past few weeks but on exam today well controlled. Recommend continuing torsemide 40mg daily. Reinforced low sodium diet and 2L fluid restriction. Encouraged to call with questions/concerns.     Changes today: none    Follow up plan:     Cardiology follow up in 3 months with labs prior         History of Presenting Illness:    Thierry Samuel is a very pleasant 87 year old male with a history of atrial fibrillation on warfarin, s/p PPM, hypertension, hyperlipidemia, HFpEF (most likely right HF/pulmonary hypertension),  "COPD on 3L NC, SUSY on CPAP and history of GI bleed.      He established care with Dr. Rod in August at which time he was noted to have significant LE edema, which was suspected to be related to right heart failure. He was started on spironolactone 25mg daily. During follow up weight was down and edema had improved, but he was experiencing cramps in legs/hands his furosemide 40mg daily was stopped.     He was unfortunately hospitalized due to COPD exacerbation and likely adrenal insufficiency from steroid withdrawal.       I met patient and his daughter for the first time in December at which time he was volume overloaded with worsening LE edema. His furosemide was changed to torsemide 40mg daily. Follow up BMP showed stable kidney function and electrolytes. He has since missed cardiology follow up.     Patient is here today for cardiology follow up. Daughter with for visit and translating, declined .     Patient reports feeling good. No monitoring home weight consistently at home, clinic weight down 6# since last visit. So initially after starting torsemide noted a significant improvement in LE edema and now the past few weeks has had some increase in swelling but still better then it had been. Denies any changes with diet/liquids. Denies shortness of breath at rest. Some exertional dyspnea, which he feels is \"about the same\". Also has a chronic cough with no change.  Denies orthopnea or PND. Using CPAP nightly. Denies chest pain or chest tightness. Denies dizziness, lightheadedness or other presyncopal symptoms. Denies tachycardia or palpitations. Taking medications daily.     No labs today. Blood pressure 106/58 and HR 80 in clinic today.    Appetite good. Eating meals at home. Limiting sodium intake. No set exercise routine, notes had been walking during the summer months, a few times a week walking at the mall for about 1 hr. Denies tobacco use. Denies alcohol use.         Social History      Social " History     Socioeconomic History     Marital status:      Spouse name: Not on file     Number of children: Not on file     Years of education: Not on file     Highest education level: Not on file   Occupational History     Not on file   Tobacco Use     Smoking status: Former Smoker     Types: Cigarettes     Smokeless tobacco: Never Used   Vaping Use     Vaping Use: Never used   Substance and Sexual Activity     Alcohol use: Not Currently     Comment: social     Drug use: No     Sexual activity: Not Currently   Other Topics Concern     Parent/sibling w/ CABG, MI or angioplasty before 65F 55M? Not Asked   Social History Narrative     Not on file     Social Determinants of Health     Financial Resource Strain: Not on file   Food Insecurity: Not on file   Transportation Needs: Not on file   Physical Activity: Not on file   Stress: Not on file   Social Connections: Not on file   Intimate Partner Violence: Not on file   Housing Stability: Not on file            Review of Systems:   Skin:  Positive for pigmentation   Eyes:  Positive for visual blurring  ENT:  Positive for hearing loss  Respiratory:  Positive for dyspnea on exertion;cough;sleep apnea;CPAP  Cardiovascular:    Positive for;edema  Gastroenterology: Negative    Genitourinary:  Negative    Musculoskeletal:  Positive for back pain;neck pain  Neurologic:  Positive for numbness or tingling of hands  Psychiatric:  Negative    Heme/Lymph/Imm:  Negative    Endocrine:  Negative           Physical Exam:   Vitals: /58   Pulse 80   Ht 1.524 m (5')   Wt 74.6 kg (164 lb 6.4 oz)   SpO2 98%   BMI 32.11 kg/m     Wt Readings from Last 4 Encounters:   02/18/22 74.6 kg (164 lb 6.4 oz)   12/20/21 77.5 kg (170 lb 12.8 oz)   09/29/21 76.7 kg (169 lb)   09/23/21 77.1 kg (170 lb)     GEN: well nourished, in no acute distress.  HEENT:  Pupils equal, round. Sclerae nonicteric.   NECK: Supple, no masses appreciated. JVP appears normal.   C/V:  Irregularly irregular  rate and rhythm, no murmur, rub or gallop.   RESP: Respirations are unlabored. Clear to auscultation bilaterally without wheezing, rales, or rhonchi.  GI: Abdomen soft, nontender.  EXTREM: Trace bilateral LE edema, venous stasis skin changes.  NEURO: Alert and oriented, cooperative.  SKIN: Warm and dry.       Data:   LIVER ENZYME RESULTS:  Lab Results   Component Value Date    AST 19 09/04/2021    AST 16 05/26/2021    ALT 23 09/04/2021    ALT 24 05/26/2021     CBC RESULTS:  Lab Results   Component Value Date    WBC 5.0 09/05/2021    WBC 9.2 05/27/2021    RBC 4.28 (L) 09/05/2021    RBC 4.22 (L) 05/27/2021    HGB 13.1 (L) 09/05/2021    HGB 13.3 05/27/2021    HCT 40.6 09/05/2021    HCT 41.2 05/27/2021    MCV 95 09/05/2021    MCV 98 05/27/2021    MCH 30.6 09/05/2021    MCH 31.5 05/27/2021    MCHC 32.3 09/05/2021    MCHC 32.3 05/27/2021    RDW 13.2 09/05/2021    RDW 13.2 05/27/2021     09/05/2021     05/27/2021     BMP RESULTS:  Lab Results   Component Value Date     12/28/2021     05/28/2021    POTASSIUM 4.4 12/28/2021    POTASSIUM 3.8 05/28/2021    CHLORIDE 102 12/28/2021    CHLORIDE 104 05/28/2021    CO2 30 12/28/2021    CO2 30 05/28/2021    ANIONGAP 3 12/28/2021    ANIONGAP 4 05/28/2021     (H) 12/28/2021    GLC 99 05/28/2021    BUN 28 12/28/2021    BUN 26 05/28/2021    CR 1.07 12/28/2021    CR 0.78 05/28/2021    GFRESTIMATED 68 12/28/2021    GFRESTIMATED 82 05/28/2021    GFRESTBLACK >90 05/28/2021    CAMPOS 9.0 12/28/2021    CAMPOS 8.3 (L) 05/28/2021      A1C RESULTS:  Lab Results   Component Value Date    A1C 5.6 08/11/2021     INR RESULTS:  Lab Results   Component Value Date    INR 3.45 (H) 09/06/2021    INR 3.45 (H) 09/05/2021    INR 1.70 (H) 05/28/2021    INR 1.43 (H) 05/27/2021            Medications     Current Outpatient Medications   Medication Sig Dispense Refill     ALBUTEROL IN Inhale 6 puffs into the lungs every 8 hours 6 puffs q8h scheduled (Butovent/Salbutamol)        atorvastatin (LIPITOR) 10 MG tablet Take 10 mg by mouth daily        hypromellose-dextran (ARTIFICAL TEARS) 0.1-0.3 % SOLN ophthalmic solution Apply 2-3 drops to eye every hour as needed for dry eyes 30 mL 0     ipratropium - albuterol 0.5 mg/2.5 mg/3 mL (DUONEB) 0.5-2.5 (3) MG/3ML neb solution Take 1 vial by nebulization every 6 hours as needed for shortness of breath / dyspnea or wheezing       montelukast (SINGULAIR) 10 MG tablet Take 10 mg by mouth every evening       omeprazole (PRILOSEC) 20 MG DR capsule Take 20 mg by mouth 2 times daily       POTASSIUM CHLORIDE PO Take by mouth daily as needed Dosage unknown - takes PRN when he's having leg cramps       prednisoLONE 5 MG tablet Take 5 mg by mouth daily 1 tab/5 mg and 0.5 tab/2.5 mg alternating QOD       salmeterol (SEREVENT) 50 MCG/DOSE inhaler Inhale 2 puffs into the lungs 2 times daily       spironolactone (ALDACTONE) 25 MG tablet Take 1 tablet (25 mg) by mouth daily 90 tablet 3     tiotropium (SPIRIVA) 18 MCG inhalation capsule Inhale 18 mcg into the lungs daily       torsemide (DEMADEX) 20 MG tablet Take 2 tablets (40 mg) by mouth daily 180 tablet 1     warfarin ANTICOAGULANT (COUMADIN) 2.5 MG tablet Skip your coumadin dose the evening of discharge, then take 2.5 mg on Tuesday and Wednesday, and have your INR checked Thursday as scheduled. (Patient taking differently: 5mg on Mon and Wed, 2.5mg the rest of the week.  Next INR 9/17)            Past Medical History     Past Medical History:   Diagnosis Date     A-fib (H)     s/p pacemaker     Cardiac pacemaker in situ      CHF (congestive heart failure) (H) 09/27/2018     COPD (chronic obstructive pulmonary disease) (H)      GI bleed      Hypertension      Long term current use of anticoagulant therapy      Moderate to severe pulmonary hypertension (H)      SUSY (obstructive sleep apnea)      SUSY (obstructive sleep apnea)      Past Surgical History:   Procedure Laterality Date     APPENDECTOMY       CARDIAC  SURGERY       Family History   Problem Relation Age of Onset     No Known Problems Mother      No Known Problems Father             Allergies   Patient has no known allergies.    30 minutes spent on the date of the encounter doing chart review, history and exam, documentation and further activities as noted above      VERONIQUE Kimball [facility]ART [facility]ARE  Pager: [phone]

## 2022-04-29 ENCOUNTER — TELEPHONE (OUTPATIENT)
Dept: CARDIOLOGY | Facility: CLINIC | Age: 87
End: 2022-04-29
Payer: MEDICAID

## 2022-04-29 NOTE — TELEPHONE ENCOUNTER
----- Message from VERONIQUE Hernandez CNP sent at 4/29/2022  2:19 PM CDT -----  Regarding: FW: EMA Care Plan Signature Request  Hi,    I got this below message, unclear if this is something I should complete or if it should go to PCP.     Could you look into this more?     Thanks,  Symone   ----- Message -----  From: Maikel Castillo  Sent: 4/29/2022  11:50 AM CDT  To: VERONIQUE Hernandez CNP  Subject: EMA Care Plan Signature Request                  Hello,    I am reaching out regarding signing a care plan for this patient as they have Emergency Medical Assistance (EMA). Patient was treated for Dx codes: J96.21, R60.0, I50.9, I50.33, and I50.32.    Care Plans are required by [facility] so [place] may be reimbursed for past treatment. Therefore, a care plan just needs the treating provider's signature.       Could you let me know the easiest way to send it to you (email/fax or nurse in your office) for signing?    Thank you!   Maikel Castillo  [phone]  EMA Care

## 2022-04-29 NOTE — TELEPHONE ENCOUNTER
Called Maikel to get more information regarding forma dn request for provider signature.   Thierry applied for Emergency Medical Assistance (EMA) during a hospitalization May 2021. [place] is back logged on EMAs.   In order for [place] to be reimbursed, a treating provider needs to sign Thierry's care plan for acute/chronic respiratory failure with hypoxia (J96.21), localized edema (R60.0), Heart failure unspecified (I50.9), acute on chronic diastolic heart failure (I50.33), Chronic Diastolic heart failure (I50.32)    Requested EMA start date 5/25/2021.    This EMA will cover Thierry's hospitalizations and clinic appts. Last appt was 2/18/2022 with Symone.     Maikel is aware that Thierry has been treated by multiple providers for the same diagnoses.     Will fax form to Symone to sign and fax back to Maikel.        Future Appointments   Date Time Provider Department Center   5/20/2022  3:00 PM RU LAB [facility]   5/20/2022  3:50 PM Symone Solorio APRN CNP [facility]       Rocío Robledo, RN 3:36 PM 04/29/22

## 2022-05-09 NOTE — TELEPHONE ENCOUNTER
Faxed completed paperwork to [phone].    Wong BAER([facility]) 05/09/22  2:16 PM

## 2022-05-23 NOTE — TELEPHONE ENCOUNTER
Re-faxed paperwork to Maikel Castillo at [phone]  Sent original to scan.     Wong BAER([facility]) 05/23/22  10:56 AM

## 2022-06-09 ENCOUNTER — HOSPITAL ENCOUNTER (INPATIENT)
Facility: CLINIC | Age: 87
LOS: 2 days | Discharge: HOME | End: 2022-06-13
Attending: EMERGENCY MEDICINE | Admitting: INTERNAL MEDICINE
Payer: MEDICAID

## 2022-06-09 DIAGNOSIS — D62 ANEMIA DUE TO BLOOD LOSS, ACUTE: Primary | ICD-10-CM

## 2022-06-09 DIAGNOSIS — L03.115 CELLULITIS OF RIGHT LOWER EXTREMITY: ICD-10-CM

## 2022-06-09 DIAGNOSIS — K62.5 RECTAL BLEEDING: ICD-10-CM

## 2022-06-09 LAB
ABO/RH(D): NORMAL
ALBUMIN SERPL-MCNC: 3.4 G/DL (ref 3.4–5)
ALP SERPL-CCNC: 73 U/L (ref 40–150)
ALT SERPL W P-5'-P-CCNC: 23 U/L (ref 0–70)
ANION GAP SERPL CALCULATED.3IONS-SCNC: 8 MMOL/L (ref 3–14)
ANTIBODY SCREEN: NEGATIVE
AST SERPL W P-5'-P-CCNC: 12 U/L (ref 0–45)
BASOPHILS # BLD AUTO: 0 10E3/UL (ref 0–0.2)
BASOPHILS NFR BLD AUTO: 0 %
BILIRUB SERPL-MCNC: 0.6 MG/DL (ref 0.2–1.3)
BUN SERPL-MCNC: 31 MG/DL (ref 7–30)
CALCIUM SERPL-MCNC: 9.4 MG/DL (ref 8.5–10.1)
CHLORIDE BLD-SCNC: 99 MMOL/L (ref 94–109)
CO2 SERPL-SCNC: 29 MMOL/L (ref 20–32)
CREAT SERPL-MCNC: 0.86 MG/DL (ref 0.66–1.25)
EOSINOPHIL # BLD AUTO: 0.1 10E3/UL (ref 0–0.7)
EOSINOPHIL NFR BLD AUTO: 1 %
ERYTHROCYTE [DISTWIDTH] IN BLOOD BY AUTOMATED COUNT: 12.9 % (ref 10–15)
GFR SERPL CREATININE-BSD FRML MDRD: 84 ML/MIN/1.73M2
GLUCOSE BLD-MCNC: 122 MG/DL (ref 70–99)
HCT VFR BLD AUTO: 36.9 % (ref 40–53)
HGB BLD-MCNC: 12 G/DL (ref 13.3–17.7)
HOLD SPECIMEN: NORMAL
IMM GRANULOCYTES # BLD: 0.1 10E3/UL
IMM GRANULOCYTES NFR BLD: 1 %
LYMPHOCYTES # BLD AUTO: 1.7 10E3/UL (ref 0.8–5.3)
LYMPHOCYTES NFR BLD AUTO: 16 %
MCH RBC QN AUTO: 32.2 PG (ref 26.5–33)
MCHC RBC AUTO-ENTMCNC: 32.5 G/DL (ref 31.5–36.5)
MCV RBC AUTO: 99 FL (ref 78–100)
MONOCYTES # BLD AUTO: 1.5 10E3/UL (ref 0–1.3)
MONOCYTES NFR BLD AUTO: 14 %
NEUTROPHILS # BLD AUTO: 7.3 10E3/UL (ref 1.6–8.3)
NEUTROPHILS NFR BLD AUTO: 68 %
NRBC # BLD AUTO: 0 10E3/UL
NRBC BLD AUTO-RTO: 0 /100
PLATELET # BLD AUTO: 240 10E3/UL (ref 150–450)
POTASSIUM BLD-SCNC: 4.5 MMOL/L (ref 3.4–5.3)
PROT SERPL-MCNC: 6.8 G/DL (ref 6.8–8.8)
RBC # BLD AUTO: 3.73 10E6/UL (ref 4.4–5.9)
SODIUM SERPL-SCNC: 136 MMOL/L (ref 133–144)
SPECIMEN EXPIRATION DATE: NORMAL
WBC # BLD AUTO: 10.6 10E3/UL (ref 4–11)

## 2022-06-09 PROCEDURE — 85025 COMPLETE CBC W/AUTO DIFF WBC: CPT | Performed by: EMERGENCY MEDICINE

## 2022-06-09 PROCEDURE — 80053 COMPREHEN METABOLIC PANEL: CPT | Performed by: EMERGENCY MEDICINE

## 2022-06-09 PROCEDURE — 36415 COLL VENOUS BLD VENIPUNCTURE: CPT | Performed by: EMERGENCY MEDICINE

## 2022-06-09 PROCEDURE — 99285 EMERGENCY DEPT VISIT HI MDM: CPT | Mod: 25

## 2022-06-09 PROCEDURE — 82040 ASSAY OF SERUM ALBUMIN: CPT | Performed by: EMERGENCY MEDICINE

## 2022-06-09 PROCEDURE — 86850 RBC ANTIBODY SCREEN: CPT | Performed by: EMERGENCY MEDICINE

## 2022-06-09 PROCEDURE — C9803 HOPD COVID-19 SPEC COLLECT: HCPCS

## 2022-06-09 PROCEDURE — 93005 ELECTROCARDIOGRAM TRACING: CPT

## 2022-06-10 ENCOUNTER — APPOINTMENT (OUTPATIENT)
Dept: CT IMAGING | Facility: CLINIC | Age: 87
End: 2022-06-10
Attending: EMERGENCY MEDICINE
Payer: MEDICAID

## 2022-06-10 PROBLEM — K62.5 RECTAL BLEEDING: Status: ACTIVE | Noted: 2022-06-10

## 2022-06-10 LAB
ATRIAL RATE - MUSE: 178 BPM
DIASTOLIC BLOOD PRESSURE - MUSE: NORMAL MMHG
HGB BLD-MCNC: 10.7 G/DL (ref 13.3–17.7)
HGB BLD-MCNC: 10.8 G/DL (ref 13.3–17.7)
HGB BLD-MCNC: 10.8 G/DL (ref 13.3–17.7)
HGB BLD-MCNC: 11 G/DL (ref 13.3–17.7)
HOLD SPECIMEN: NORMAL
INR PPP: 3.38 (ref 0.85–1.15)
INR PPP: 3.43 (ref 0.85–1.15)
INTERPRETATION ECG - MUSE: NORMAL
P AXIS - MUSE: NORMAL DEGREES
PR INTERVAL - MUSE: NORMAL MS
QRS DURATION - MUSE: 176 MS
QT - MUSE: 472 MS
QTC - MUSE: 467 MS
R AXIS - MUSE: 245 DEGREES
SARS-COV-2 RNA RESP QL NAA+PROBE: NEGATIVE
SYSTOLIC BLOOD PRESSURE - MUSE: NORMAL MMHG
T AXIS - MUSE: 82 DEGREES
VENTRICULAR RATE- MUSE: 59 BPM

## 2022-06-10 PROCEDURE — U0003 INFECTIOUS AGENT DETECTION BY NUCLEIC ACID (DNA OR RNA); SEVERE ACUTE RESPIRATORY SYNDROME CORONAVIRUS 2 (SARS-COV-2) (CORONAVIRUS DISEASE [COVID-19]), AMPLIFIED PROBE TECHNIQUE, MAKING USE OF HIGH THROUGHPUT TECHNOLOGIES AS DESCRIBED BY CMS-2020-01-R: HCPCS | Performed by: EMERGENCY MEDICINE

## 2022-06-10 PROCEDURE — 99218 PR INITIAL OBSERVATION CARE,LEVEL I: CPT | Performed by: INTERNAL MEDICINE

## 2022-06-10 PROCEDURE — G0378 HOSPITAL OBSERVATION PER HR: HCPCS

## 2022-06-10 PROCEDURE — 85610 PROTHROMBIN TIME: CPT | Performed by: EMERGENCY MEDICINE

## 2022-06-10 PROCEDURE — 250N000009 HC RX 250: Performed by: EMERGENCY MEDICINE

## 2022-06-10 PROCEDURE — 36415 COLL VENOUS BLD VENIPUNCTURE: CPT | Performed by: EMERGENCY MEDICINE

## 2022-06-10 PROCEDURE — 36415 COLL VENOUS BLD VENIPUNCTURE: CPT | Performed by: INTERNAL MEDICINE

## 2022-06-10 PROCEDURE — 74177 CT ABD & PELVIS W/CONTRAST: CPT

## 2022-06-10 PROCEDURE — 85610 PROTHROMBIN TIME: CPT | Performed by: INTERNAL MEDICINE

## 2022-06-10 PROCEDURE — 258N000003 HC RX IP 258 OP 636: Performed by: INTERNAL MEDICINE

## 2022-06-10 PROCEDURE — 250N000013 HC RX MED GY IP 250 OP 250 PS 637: Performed by: INTERNAL MEDICINE

## 2022-06-10 PROCEDURE — 250N000011 HC RX IP 250 OP 636: Performed by: EMERGENCY MEDICINE

## 2022-06-10 PROCEDURE — 85018 HEMOGLOBIN: CPT | Performed by: INTERNAL MEDICINE

## 2022-06-10 PROCEDURE — 250N000013 HC RX MED GY IP 250 OP 250 PS 637: Performed by: STUDENT IN AN ORGANIZED HEALTH CARE EDUCATION/TRAINING PROGRAM

## 2022-06-10 RX ORDER — ONDANSETRON 2 MG/ML
4 INJECTION INTRAMUSCULAR; INTRAVENOUS EVERY 6 HOURS PRN
Status: DISCONTINUED | OUTPATIENT
Start: 2022-06-10 | End: 2022-06-13 | Stop reason: HOSPADM

## 2022-06-10 RX ORDER — ACETAMINOPHEN 650 MG/1
650 SUPPOSITORY RECTAL EVERY 6 HOURS PRN
Status: DISCONTINUED | OUTPATIENT
Start: 2022-06-10 | End: 2022-06-13

## 2022-06-10 RX ORDER — FLUTICASONE PROPIONATE AND SALMETEROL 500; 50 UG/1; UG/1
1 POWDER RESPIRATORY (INHALATION) EVERY 12 HOURS
COMMUNITY

## 2022-06-10 RX ORDER — ACETAMINOPHEN 325 MG/1
650 TABLET ORAL EVERY 6 HOURS PRN
Status: DISCONTINUED | OUTPATIENT
Start: 2022-06-10 | End: 2022-06-13

## 2022-06-10 RX ORDER — PANTOPRAZOLE SODIUM 20 MG/1
40 TABLET, DELAYED RELEASE ORAL
Status: DISCONTINUED | OUTPATIENT
Start: 2022-06-10 | End: 2022-06-13 | Stop reason: HOSPADM

## 2022-06-10 RX ORDER — PREDNISONE 5 MG/1
5 TABLET ORAL EVERY OTHER DAY
Status: DISCONTINUED | OUTPATIENT
Start: 2022-06-11 | End: 2022-06-13 | Stop reason: HOSPADM

## 2022-06-10 RX ORDER — MONTELUKAST SODIUM 10 MG/1
10 TABLET ORAL EVERY EVENING
Status: DISCONTINUED | OUTPATIENT
Start: 2022-06-10 | End: 2022-06-13 | Stop reason: HOSPADM

## 2022-06-10 RX ORDER — IOPAMIDOL 755 MG/ML
500 INJECTION, SOLUTION INTRAVASCULAR ONCE
Status: COMPLETED | OUTPATIENT
Start: 2022-06-10 | End: 2022-06-10

## 2022-06-10 RX ORDER — IPRATROPIUM BROMIDE AND ALBUTEROL SULFATE 2.5; .5 MG/3ML; MG/3ML
1 SOLUTION RESPIRATORY (INHALATION) EVERY 6 HOURS PRN
Status: DISCONTINUED | OUTPATIENT
Start: 2022-06-10 | End: 2022-06-13 | Stop reason: HOSPADM

## 2022-06-10 RX ORDER — ALBUTEROL SULFATE 90 UG/1
2 AEROSOL, METERED RESPIRATORY (INHALATION) EVERY 6 HOURS PRN
Status: DISCONTINUED | OUTPATIENT
Start: 2022-06-10 | End: 2022-06-13 | Stop reason: HOSPADM

## 2022-06-10 RX ORDER — SODIUM CHLORIDE 9 MG/ML
INJECTION, SOLUTION INTRAVENOUS CONTINUOUS
Status: DISCONTINUED | OUTPATIENT
Start: 2022-06-10 | End: 2022-06-13 | Stop reason: HOSPADM

## 2022-06-10 RX ORDER — FUROSEMIDE 20 MG
20 TABLET ORAL DAILY
Status: DISCONTINUED | OUTPATIENT
Start: 2022-06-10 | End: 2022-06-13 | Stop reason: HOSPADM

## 2022-06-10 RX ORDER — ALBUTEROL SULFATE 90 UG/1
2 AEROSOL, METERED RESPIRATORY (INHALATION) EVERY 6 HOURS PRN
COMMUNITY

## 2022-06-10 RX ORDER — ONDANSETRON 4 MG/1
4 TABLET, ORALLY DISINTEGRATING ORAL EVERY 6 HOURS PRN
Status: DISCONTINUED | OUTPATIENT
Start: 2022-06-10 | End: 2022-06-13 | Stop reason: HOSPADM

## 2022-06-10 RX ORDER — ATORVASTATIN CALCIUM 10 MG/1
10 TABLET, FILM COATED ORAL DAILY
Status: DISCONTINUED | OUTPATIENT
Start: 2022-06-11 | End: 2022-06-13 | Stop reason: HOSPADM

## 2022-06-10 RX ORDER — SPIRONOLACTONE 25 MG/1
25 TABLET ORAL DAILY
Status: DISCONTINUED | OUTPATIENT
Start: 2022-06-11 | End: 2022-06-13 | Stop reason: HOSPADM

## 2022-06-10 RX ADMIN — PANTOPRAZOLE SODIUM 40 MG: 20 TABLET, DELAYED RELEASE ORAL at 19:55

## 2022-06-10 RX ADMIN — MONTELUKAST 10 MG: 10 TABLET, FILM COATED ORAL at 19:55

## 2022-06-10 RX ADMIN — FLUTICASONE FUROATE AND VILANTEROL TRIFENATATE 1 PUFF: 200; 25 POWDER RESPIRATORY (INHALATION) at 21:42

## 2022-06-10 RX ADMIN — SODIUM CHLORIDE: 9 INJECTION, SOLUTION INTRAVENOUS at 21:12

## 2022-06-10 RX ADMIN — SODIUM CHLORIDE 61 ML: 9 INJECTION, SOLUTION INTRAVENOUS at 00:55

## 2022-06-10 RX ADMIN — SODIUM CHLORIDE: 9 INJECTION, SOLUTION INTRAVENOUS at 08:04

## 2022-06-10 RX ADMIN — IOPAMIDOL 82 ML: 755 INJECTION, SOLUTION INTRAVENOUS at 00:55

## 2022-06-10 RX ADMIN — UMECLIDINIUM 1 PUFF: 62.5 AEROSOL, POWDER ORAL at 21:43

## 2022-06-10 RX ADMIN — FUROSEMIDE 20 MG: 20 TABLET ORAL at 21:43

## 2022-06-10 ASSESSMENT — ENCOUNTER SYMPTOMS
ANAL BLEEDING: 1
FATIGUE: 1
ACTIVITY CHANGE: 1
BLOOD IN STOOL: 1
SHORTNESS OF BREATH: 0
RECTAL PAIN: 0
VOMITING: 0
NAUSEA: 0

## 2022-06-10 NOTE — PLAN OF CARE
ROOM #223      Living Situation (if not independent, order SW consult): Home  Facility name:  : Daughter    Activity level at baseline: Independent   Activity level on admit: x1 due to DIZZINESS    Who will be transporting you at discharge: Most likely family     Patient registered to observation; given Patient Bill of Rights; given the opportunity to ask questions about observation status and their plan of care. Patient has been oriented to the observation room, bathroom and call light is in place. Discussed discharge goals and expectations with patient/family.

## 2022-06-10 NOTE — ED TRIAGE NOTES
Pt here with 5 days of rectal bleeding. Just told daughter today about bleeding. Dark red blood in stools. 2 stools today. History of rectal bleeding. On warfarin for pacemaker.      Triage Assessment     Row Name 06/09/22 2123       Triage Assessment (Adult)    Airway WDL WDL       Respiratory WDL    Respiratory WDL WDL       Skin Circulation/Temperature WDL    Skin Circulation/Temperature WDL WDL       Cardiac WDL    Cardiac WDL WDL       Peripheral/Neurovascular WDL    Peripheral Neurovascular WDL WDL       Cognitive/Neuro/Behavioral WDL    Cognitive/Neuro/Behavioral WDL X  feeling tired and dizzy       Ashley Coma Scale    Best Eye Response 4-->(E4) spontaneous    Best Motor Response 6-->(M6) obeys commands    Best Verbal Response 5-->(V5) oriented    Ashley Coma Scale Score 15

## 2022-06-10 NOTE — H&P
[facility]    History and Physical - Hospitalist Service       Date of Admission:  6/9/2022    Assessment & Plan      Thierry Samuel is a 87 year old male admitted on 6/9/2022. He is a Ecuadorean-speaking male who lives with his daughter who is interpreting.  The patient has been having bright red blood per rectum for the past week.  He denies any abdominal pain or fevers or chills.  He denies any chest pain shortness of breath nausea or vomiting.  He has a history of COPD on oxygen as needed, obstructive sleep apnea on CPAP, A. fib on Coumadin, hypertension,  Hyperlipidemia, moderate pulmonary hypertension and diastolic heart failure.    1, BRBPR likely diverticular bleeding.  - Monitor H/H.  - GI consult.  - NPO.  - Fortunately, Hb has not drifted down significantly.    2. Chronic HFpEF.  - Hold diuretics for now given above.    3. SUSY.  - CPAP at bedtime.    4.a fib on coumadin.  - Hold coumadin for now.  - resume when H/H stable.          Diet:   NPO except meds.  DVT Prophylaxis: Warfarin  Corona Catheter: Not present  Central Lines: None  Cardiac Monitoring: None  Code Status:  Full Code.    Clinically Significant Risk Factors Present on Admission               # Coagulation Defect: home medication list includes an anticoagulant medication        Disposition Plan   Expected Discharge:    Anticipated discharge location:  Awaiting care coordination huddle  Delays:          The patient's care was discussed with the Patient and Patient's Family.    Hansel Rocha MD  Hospitalist Service  [facility]  Securely message with the Vocera Web Console (learn more here)  Text page via [employer] Paging/Directory         ______________________________________________________________________    Chief Complaint     BRBPR x 1 week.    History is obtained from the patient    History of Present Illness   Thierry Samuel is a 87 year old male who is a Ecuadorean-speaking male who  lives with his daughter who is interpreting.  The patient has been having bright red blood per rectum for the past week.  He denies any abdominal pain or fevers or chills.  He denies any chest pain shortness of breath nausea or vomiting.  He has a history of COPD on oxygen as needed, obstructive sleep apnea on CPAP, A. fib on Coumadin, hypertension,  Hyperlipidemia, moderate pulmonary hypertension and diastolic heart failure.    Review of Systems    The 10 point Review of Systems is negative other than noted in the HPI or here.     Past Medical History    I have reviewed this patient's medical history and updated it with pertinent information if needed.   Past Medical History:   Diagnosis Date     A-fib (H)     s/p pacemaker     Cardiac pacemaker in situ      CHF (congestive heart failure) (H) 09/27/2018     COPD (chronic obstructive pulmonary disease) (H)      GI bleed      Hypertension      Long term current use of anticoagulant therapy      Moderate to severe pulmonary hypertension (H)      SUSY (obstructive sleep apnea)      SUSY (obstructive sleep apnea)        Past Surgical History   I have reviewed this patient's surgical history and updated it with pertinent information if needed.  Past Surgical History:   Procedure Laterality Date     APPENDECTOMY       CARDIAC SURGERY         Social History   I have reviewed this patient's social history and updated it with pertinent information if needed.  Social History     Tobacco Use     Smoking status: Former Smoker     Types: Cigarettes     Smokeless tobacco: Never Used   Vaping Use     Vaping Use: Never used   Substance Use Topics     Alcohol use: Not Currently     Comment: social     Drug use: No       Family History   I have reviewed this patient's family history and updated it with pertinent information if needed.  Family History   Problem Relation Age of Onset     No Known Problems Mother      No Known Problems Father        Prior to Admission Medications   Prior  to Admission Medications   Prescriptions Last Dose Informant Patient Reported? Taking?   ALBUTEROL IN   Yes No   Sig: Inhale 6 puffs into the lungs every 8 hours 6 puffs q8h scheduled (Butovent/Salbutamol)   POTASSIUM CHLORIDE PO   Yes No   Sig: Take by mouth daily as needed Dosage unknown - takes PRN when he's having leg cramps   atorvastatin (LIPITOR) 10 MG tablet   Yes No   Sig: Take 10 mg by mouth daily    hypromellose-dextran (ARTIFICAL TEARS) 0.1-0.3 % SOLN ophthalmic solution   No No   Sig: Apply 2-3 drops to eye every hour as needed for dry eyes   ipratropium - albuterol 0.5 mg/2.5 mg/3 mL (DUONEB) 0.5-2.5 (3) MG/3ML neb solution  Daughter Yes No   Sig: Take 1 vial by nebulization every 6 hours as needed for shortness of breath / dyspnea or wheezing   montelukast (SINGULAIR) 10 MG tablet  Daughter Yes No   Sig: Take 10 mg by mouth every evening   omeprazole (PRILOSEC) 20 MG DR capsule   Yes No   Sig: Take 20 mg by mouth 2 times daily   prednisoLONE 5 MG tablet   Yes No   Sig: Take 5 mg by mouth daily 1 tab/5 mg and 0.5 tab/2.5 mg alternating QOD   salmeterol (SEREVENT) 50 MCG/DOSE inhaler   Yes No   Sig: Inhale 2 puffs into the lungs 2 times daily   spironolactone (ALDACTONE) 25 MG tablet   No No   Sig: Take 1 tablet (25 mg) by mouth daily   tiotropium (SPIRIVA) 18 MCG inhalation capsule   Yes No   Sig: Inhale 18 mcg into the lungs daily   torsemide (DEMADEX) 20 MG tablet   No No   Sig: Take 2 tablets (40 mg) by mouth daily   warfarin ANTICOAGULANT (COUMADIN) 2.5 MG tablet   No No   Sig: Skip your coumadin dose the evening of discharge, then take 2.5 mg on Tuesday and Wednesday, and have your INR checked Thursday as scheduled.   Patient taking differently: 5mg on Mon and Wed, 2.5mg the rest of the week.  Next INR 9/17      Facility-Administered Medications: None     Allergies   No Known Allergies    Physical Exam   Vital Signs: Temp: 97.3  F (36.3  C) Temp src: Oral BP: 108/53 Pulse: 61   Resp: 20 SpO2: 96 %  O2 Device: None (Room air)    Weight: 0 lbs 0 oz    Gen - Alert, awake, comfortable in NAD.  Lungs - diminished BS.  Heart - distant HS, RR, S1+S2 nml, no m/g/r. + PPM R chest wall.  Abd - soft, NT, ND, + BS.  Ext - 1+ edema with chronic venous stasis changes.    Data   Data reviewed today: I reviewed all medications, new labs and imaging results over the last 24 hours. I personally reviewed the EKG tracing showing V paced rhythm @ 60 bpm..    Recent Labs   Lab 06/10/22  0022 06/09/22  2205   WBC  --  10.6   HGB  --  12.0*   MCV  --  99   PLT  --  240   INR 3.38*  --    NA  --  136   POTASSIUM  --  4.5   CHLORIDE  --  99   CO2  --  29   BUN  --  31*   CR  --  0.86   ANIONGAP  --  8   CAMPOS  --  9.4   GLC  --  122*   ALBUMIN  --  3.4   PROTTOTAL  --  6.8   BILITOTAL  --  0.6   ALKPHOS  --  73   ALT  --  23   AST  --  12     Recent Results (from the past 24 hour(s))   CT Abdomen Pelvis w Contrast    Narrative    EXAM: CT ABDOMEN PELVIS W CONTRAST  LOCATION: [facility]  DATE/TIME: 6/10/2022 12:52 AM    INDICATION: Abdominal pain, acute, nonlocalized  COMPARISON: None.  TECHNIQUE: CT scan of the abdomen and pelvis was performed following injection of IV contrast. Multiplanar reformats were obtained. Dose reduction techniques were used.  CONTRAST: 82 mL Isovue 370    FINDINGS:   LOWER CHEST: Cardiac enlargement with partially visualized bilateral atrial enlargement. Coronary artery calcification. Minimal pulmonary emphysema. No infiltrates or effusions.    HEPATOBILIARY: Cholelithiasis. No biliary dilatation. Liver unremarkable.    PANCREAS: Normal.    SPLEEN: Normal.    ADRENAL GLANDS: Normal.    KIDNEYS/BLADDER: Symmetric renal enhancement. Left lower pole renal cyst, no further follow-up. No urinary collecting system dilatation or calculi. Bladder unremarkable. Marked prostate enlargement.    BOWEL: Colonic diverticulosis. No dilatation or inflammatory change.    LYMPH NODES: No  lymphadenopathy.    VASCULATURE: Normal caliber abdominal aorta. Moderate vascular calcification.    PELVIC ORGANS: No free fluid.    MUSCULOSKELETAL: Fat-containing bilateral inguinal hernias, greater on the right. Minimal degenerative changes both hips and throughout the spine.      Impression    IMPRESSION:   1.  Cholelithiasis. No biliary dilatation.    2.  Colonic diverticulosis without evidence for active inflammation.    3.  No bowel dilatation or obstruction.    4.  Fat-containing bilateral inguinal hernias, greater on the right.

## 2022-06-10 NOTE — ED NOTES
[facility]  ED Nurse Handoff Report    Thierry Samuel is a 87 year old male   ED Chief complaint: Rectal Bleeding  . ED Diagnosis:   Final diagnoses:   Rectal bleeding     Allergies: No Known Allergies    Code Status: Full Code  Activity level - Baseline/Home:  Independent. Activity Level - Current:   Assist X 1. Lift room needed: No. Bariatric: No   Needed: Yes - Sami  Isolation: No. Infection: Not Applicable.     Vital Signs:   Vitals:    06/09/22 2123 06/10/22 0025 06/10/22 0030 06/10/22 0045   BP: 109/67 108/53     Pulse: 66 61     Resp: 20      Temp: 97.3  F (36.3  C)      TempSrc: Oral      SpO2: 97% 98% 96% 99%       Cardiac Rhythm:  ,      Pain level:    Patient confused: No. Patient Falls Risk: Yes.   Elimination Status: Has voided   Patient Report - Initial Complaint: Rectal bleeding.   Focused Assessment:  Thierry Samuel is a 87 year old male accompanied by a family member who is translating for the patient anticoagulated on warfarin (last dose today) for atrial fibrillation with a Medtronic pacemaker implanted on 11/27/2012 with a history of CHF and COPD who presents to the emergency department for evaluation of rectal bleeding.The family member reports for Thierry sustained a fall after losing his balance approximately five days ago landing on his belly. Since then the patient has begun to observe an increase of bright red blood in his stool about twice a day for the last five days. It was reported that the patient had an episode of diverticulitis approximately 5-6 years ago with similar episodes Then today, he had three episodes of bloody stool. Since his fall and onset of bloody diarrhea, Thierry has begun to feel light headed and tired with decreased energy. With the increase of blood in his stool, he presented to the emergency department for further evaluation. Here, the patient presents with blood on the rectal exam. He denied any pain, shortness of breath,  chest pain, nausea, or vomiting.   Tests Performed: Labs, imaging, EKG.   Abnormal Results:   Labs Ordered and Resulted from Time of ED Arrival to Time of ED Departure   COMPREHENSIVE METABOLIC PANEL - Abnormal       Result Value    Sodium 136      Potassium 4.5      Chloride 99      Carbon Dioxide (CO2) 29      Anion Gap 8      Urea Nitrogen 31 (*)     Creatinine 0.86      Calcium 9.4      Glucose 122 (*)     Alkaline Phosphatase 73      AST 12      ALT 23      Protein Total 6.8      Albumin 3.4      Bilirubin Total 0.6      GFR Estimate 84     CBC WITH PLATELETS AND DIFFERENTIAL - Abnormal    WBC Count 10.6      RBC Count 3.73 (*)     Hemoglobin 12.0 (*)     Hematocrit 36.9 (*)     MCV 99      MCH 32.2      MCHC 32.5      RDW 12.9      Platelet Count 240      % Neutrophils 68      % Lymphocytes 16      % Monocytes 14      % Eosinophils 1      % Basophils 0      % Immature Granulocytes 1      NRBCs per 100 WBC 0      Absolute Neutrophils 7.3      Absolute Lymphocytes 1.7      Absolute Monocytes 1.5 (*)     Absolute Eosinophils 0.1      Absolute Basophils 0.0      Absolute Immature Granulocytes 0.1      Absolute NRBCs 0.0     INR - Abnormal    INR 3.38 (*)    COVID-19 VIRUS (CORONAVIRUS) BY PCR   TYPE AND SCREEN, ADULT    ABO/RH(D) B NEG      Antibody Screen Negative      SPECIMEN EXPIRATION DATE [id number]     ABO/RH TYPE AND SCREEN     CT Abdomen Pelvis w Contrast   Preliminary Result   IMPRESSION:    1.  Cholelithiasis. No biliary dilatation.      2.  Colonic diverticulosis without evidence for active inflammation.      3.  No bowel dilatation or obstruction.      4.  Fat-containing bilateral inguinal hernias, greater on the right.         Treatments provided:   Family Comments: Daughter at bedside  OBS brochure/video discussed/provided to patient:  Yes  ED Medications:   Medications   CT saline flush (61 mLs Intravenous Given 6/10/22 0055)   iopamidol (ISOVUE-370) solution 500 mL (82 mLs Intravenous Given  6/10/22 0055)     Drips infusing:  No  For the majority of the shift, the patient's behavior Green. Interventions performed were Frequent rounding.    Sepsis treatment initiated: No     Patient tested for COVID 19 prior to admission: YES    ED Nurse Name/Phone Number: Marleen Husain RN,   1:34 AM    RECEIVING UNIT ED HANDOFF REVIEW    Above ED Nurse Handoff Report was reviewed: Yes  Reviewed by: Zaina Sánchez RN on Laya 10, 2022 at 2:01 PM

## 2022-06-10 NOTE — PROGRESS NOTES
Patient seen this morning in the ER and translation provided by patient's daughter.  Discuss it with gastroenterologist Dr. Perry.  As the hemoglobin is stable, we will not reverse the Coumadin and monitor overnight.  Started clear liquid diet.    Evin De Leon MD  Internal Medicine Hospitalist  Pager: [phone]

## 2022-06-10 NOTE — CARE PLAN
PRIMARY DIAGNOSIS: GI BLEED    OUTPATIENT/OBSERVATION GOALS TO BE MET BEFORE DISCHARGE  1. Orthostatic performed: No    2. Stable Hgb Yes.   Recent Labs   Lab Test 06/10/22  0404 06/09/22  2205 09/05/21  0712   HGB 10.8* 12.0* 13.1*       3. Resolved or declined bleeding episodes: No,  with a moderate amount of bleeding Last episode: 6/10 0535    4. Appropriate testing complete: No    5. Cleared for discharge by consultants (if involved): No    6. Safe discharge environment identified: Yes    Discharge Planner Nurse   Safe discharge environment identified: Yes  Barriers to discharge: Yes       Entered by: Marly Ramirez RN 06/10/2022 8:12 AM     Please review provider order for any additional goals.   Nurse to notify provider when observation goals have been met and patient is ready for discharge.    VSS. Denies pain. Patient reports dizziness when he gets up. NPO, IVF infusing. Tele - AFib CVR PVC's.

## 2022-06-10 NOTE — PLAN OF CARE
PRIMARY DIAGNOSIS: RECTAL BLEEDING    OUTPATIENT/OBSERVATION GOALS TO BE MET BEFORE DISCHARGE  1. Orthostatic performed: No    2. Stable Hgb Yes.   Recent Labs   Lab Test 06/10/22  1316 06/10/22  1031 06/10/22  0855   HGB 11.0* 10.7* 10.8*       3. Resolved or declined bleeding episodes: Unknown at this time     4. Appropriate testing complete: Yes    5. Cleared for discharge by consultants (if involved): No    6. Safe discharge environment identified: Yes    Discharge Planner Nurse   Safe discharge environment identified: Yes  Barriers to discharge: Yes       Entered by: Zaina Sánchez RN 06/10/2022 4:15 PM     Please review provider order for any additional goals. Nurse to notify provider when observation goals have been met and patient is ready for discharge.    Patient up with standby assist. Arabic speaking. Pacemaker maintained. Clear liquid diet. Daughter in room to interpret and help patient. Right IV site infusing. Patient reports painful legs, tender to the touch. According to daughter, this is chronic. Bruises, abrasions, and scabs noted on bilateral arms and legs. Telemetry technician states patient is running v. paced with underlying atrial fibrillation CVR. One stool at 1620, medium amount, bloody and brown.

## 2022-06-10 NOTE — ED NOTES
FYI page to hospitalist: hgb down to 10.8 from 12.0 6 hours earlier.  Pt reports more weakness; denies dizziness.  One med BM dark red/black.  Current /53.

## 2022-06-10 NOTE — ED PROVIDER NOTES
History     Chief Complaint:  Rectal Bleeding    The history is provided by the patient and a relative. A  was used (Turkish).      Thierry Samuel is a 87 year old male accompanied by a family member who is translating for the patient anticoagulated on warfarin (last dose today) for atrial fibrillation with a Medtronic pacemaker implanted on 11/27/2012 with a history of CHF and COPD who presents to the emergency department for evaluation of rectal bleeding.The family member reports for Thierry sustained a fall after losing his balance approximately five days ago landing on his belly. Since then the patient has begun to observe an increase of bright red blood in his stool about twice a day for the last five days. It was reported that the patient had an episode of diverticulitis approximately 5-6 years ago with similar episodes Then today, he had three episodes of bloody stool. Since his fall and onset of bloody diarrhea, Thierry has begun to feel light headed and tired with decreased energy. With the increase of blood in his stool, he presented to the emergency department for further evaluation. Here, the patient presents with blood on the rectal exam. He denied any pain, shortness of breath, chest pain, nausea, or vomiting.     Review of Systems   Constitutional: Positive for activity change and fatigue.   Respiratory: Negative for shortness of breath.    Cardiovascular: Negative for chest pain.   Gastrointestinal: Positive for anal bleeding and blood in stool. Negative for nausea, rectal pain and vomiting.   All other systems reviewed and are negative.    Allergies:  The patient has no known allergies on file.     Medications:    Albuterol  Lipitor  Furosemide  Montelukast  Omeprazole  Aldactone  Tiotropium  Coumadin    Past Medical History:    A-fib  Cardiac pacemaker in situ  CHF  COPD  GI bleed  Hypertension  Pulmonary hypertension  SUSY    Past Surgical History:     Appendectomy  Unspecified cardiac surgery    Social History:  [employer]   The patient presents to the ED with a family member.     Physical Exam     Patient Vitals for the past 24 hrs:   BP Temp Temp src Pulse Resp SpO2   06/10/22 0045 -- -- -- -- -- 99 %   06/10/22 0030 -- -- -- -- -- 96 %   06/10/22 0025 108/53 -- -- 61 -- 98 %   06/09/22 2123 109/67 97.3  F (36.3  C) Oral 66 20 97 %     Physical Exam  Constitutional:       Appearance: He is well-developed.   HENT:      Right Ear: External ear normal.      Left Ear: External ear normal.      Mouth/Throat:      Mouth: Mucous membranes are moist.      Pharynx: Oropharynx is clear. No oropharyngeal exudate.   Eyes:      General: No scleral icterus.     Extraocular Movements: Extraocular movements intact.      Conjunctiva/sclera: Conjunctivae normal.      Pupils: Pupils are equal, round, and reactive to light.   Cardiovascular:      Rate and Rhythm: Normal rate and regular rhythm.      Heart sounds: Normal heart sounds. No murmur heard.    No friction rub. No gallop.   Pulmonary:      Effort: Pulmonary effort is normal. No respiratory distress.      Breath sounds: Normal breath sounds. No wheezing or rales.   Abdominal:      General: Bowel sounds are normal. There is no distension.      Palpations: Abdomen is soft. There is no mass.      Tenderness: There is no abdominal tenderness.   Genitourinary:     Comments: Bright red blood on rectal exam  Musculoskeletal:         General: No tenderness. Normal range of motion.   Skin:     General: Skin is warm and dry.      Capillary Refill: Capillary refill takes less than 2 seconds.      Findings: No rash.   Neurological:      Mental Status: He is alert.       Emergency Department Course   ECG:  ECG taken at 0010, ECG read at 0010  Ventricular-paced rhythm   Abnormal ECG   Rate 59 bpm. AZ interval * ms. QRS duration 176 ms. QT/QTc 472/467 ms. P-R-T axes * 245 82.     ECG results  from 09/04/21   EKG 12 lead     Value    Systolic Blood Pressure     Diastolic Blood Pressure     Ventricular Rate 60    Atrial Rate 375    NH Interval     QRS Duration 166        QTc 468    P Axis     R AXIS -67    T Axis 83    Interpretation ECG      Ventricular-paced rhythm  Abnormal ECG  When compared with ECG of 10-AUG-2021 17:39,  Electronic ventricular pacemaker has replaced Atrial fibrillation  Confirmed by - EMERGENCY ROOM, PHYSICIAN ([id number]),  KAYLYN WISEMAN ([id number]) on 9/6/2021 7:11:02 AM       Imaging:  CT Abdomen Pelvis w Contrast   Preliminary Result   IMPRESSION:    1.  Cholelithiasis. No biliary dilatation.      2.  Colonic diverticulosis without evidence for active inflammation.      3.  No bowel dilatation or obstruction.      4.  Fat-containing bilateral inguinal hernias, greater on the right.        Report per radiology    Laboratory:  Labs Ordered and Resulted from Time of ED Arrival to Time of ED Departure   COMPREHENSIVE METABOLIC PANEL - Abnormal       Result Value    Sodium 136      Potassium 4.5      Chloride 99      Carbon Dioxide (CO2) 29      Anion Gap 8      Urea Nitrogen 31 (*)     Creatinine 0.86      Calcium 9.4      Glucose 122 (*)     Alkaline Phosphatase 73      AST 12      ALT 23      Protein Total 6.8      Albumin 3.4      Bilirubin Total 0.6      GFR Estimate 84     CBC WITH PLATELETS AND DIFFERENTIAL - Abnormal    WBC Count 10.6      RBC Count 3.73 (*)     Hemoglobin 12.0 (*)     Hematocrit 36.9 (*)     MCV 99      MCH 32.2      MCHC 32.5      RDW 12.9      Platelet Count 240      % Neutrophils 68      % Lymphocytes 16      % Monocytes 14      % Eosinophils 1      % Basophils 0      % Immature Granulocytes 1      NRBCs per 100 WBC 0      Absolute Neutrophils 7.3      Absolute Lymphocytes 1.7      Absolute Monocytes 1.5 (*)     Absolute Eosinophils 0.1      Absolute Basophils 0.0      Absolute Immature Granulocytes 0.1      Absolute NRBCs 0.0     INR - Abnormal     INR 3.38 (*)    TYPE AND SCREEN, ADULT    ABO/RH(D) B NEG      Antibody Screen Negative      SPECIMEN EXPIRATION DATE [id number]     ABO/RH TYPE AND SCREEN     Procedures:    Emergency Department Course:     Reviewed:  I reviewed nursing notes, vitals, past medical history and MIIC    Assessments:  2352 I obtained history and examined the patient as noted above.   0200 I rechecked the patient and explained findings.     Consults:   0207  I spoke with Dr. Rocha with the Hospitalist Service on the phone.     Disposition:  The patient was admitted to the hospital under the care of Dr. Rocha.     Impression & Plan      Medical Decision Making:  Patient presents with rectal bleeding with increasing amounts in the last 5 days.  Currently vital signs are stable.  On his digital rectal exam, I did not get any stool as it was mostly bloody.  His family member did bring in photos that show he did have episode with well formed stool with bright red blood.  He then subsequently had more bright red blood without normal stool.  He has not had any recurrent bleeding.  Given the recurrent nature of his bleeding as well as an INR of 3.38, patient will require admission for observation.  He certainly can have it colorectal evaluation as well.  He is admitted to the hospital service.    Diagnosis:    ICD-10-CM    1. Rectal bleeding  K62.5      Scribe Disclosure:  DINO, Concepciónjerome Cristobal, am serving as a scribe at 11:52 PM on 6/9/2022 to document services personally performed by Jamari Rae MD based on my observations and the provider's statements to me.      Jamari Rae MD  06/10/22 0215

## 2022-06-10 NOTE — CARE PLAN
PRIMARY DIAGNOSIS: GI BLEED    OUTPATIENT/OBSERVATION GOALS TO BE MET BEFORE DISCHARGE  1. Orthostatic performed: No    2. Stable Hgb Yes. Hgb 10.7    3. Resolved or declined bleeding episodes: No,  with a moderate amount of bleeding Last episode: 6/10     4. Appropriate testing complete: No    5. Cleared for discharge by consultants (if involved): No    6. Safe discharge environment identified: Yes    Discharge Planner Nurse   Safe discharge environment identified: Yes  Barriers to discharge: Yes       Entered by: Karen Arshad RN 06/10/2022 11:49 AM     Please review provider order for any additional goals.   Nurse to notify provider when observation goals have been met and patient is ready for discharge.    VSS. Complains of some pain in R calf- ice pack applied. INR 3.38. Patient reports dizziness when he gets up. NPO, IVF infusing. Tele - AFib CVR PVC's.

## 2022-06-10 NOTE — CONSULTS
Gastroenterology Consultation    Patient: Thierry Samuel   1935  Date of Consult:  6/10/2022  Admission Date/Time: 6/9/2022 11:35 PM  Primary Care Provider:  Trinity Brown    Requesting Physician: Jamari Rae MD    I was asked to see this patient in consultation by Dr. De Leon for evaluation of hematochezia.  His daughter translated.    HPI:  Thierry Samuel is a 87 year old male who has a history of diverticular bleeding, has been having hematochezia for about 1 week.  Sometimes red blood mixed with stool, although yesterday he just passed a large amount of red blood.  Last BM was 3 am today.    He denies abdominal pain, nausea, vomiting, heartburn, reflux, decreased appetite, or weight loss.  He takes 200-400 mg ibuprofen 1-2 times per week.    He states he had a diverticular bleed about 5 years ago, notes it was significantly more blood.  He ultimately had a colonoscopy with clipping.    REVIEW OF SYSTEMS:  A comprehensive ten point review of systems was performed and was negative aside from dizziness, shortness of breath, and L shoulder pain..      PAST MEDICAL HISTORY:  Past Medical History:   Diagnosis Date     A-fib (H)     s/p pacemaker     Cardiac pacemaker in situ      CHF (congestive heart failure) (H) 09/27/2018     COPD (chronic obstructive pulmonary disease) (H)      GI bleed      Hypertension      Long term current use of anticoagulant therapy      Moderate to severe pulmonary hypertension (H)      SUSY (obstructive sleep apnea)      SUSY (obstructive sleep apnea)        PAST SURGICAL HISTORY:  Past Surgical History:   Procedure Laterality Date     APPENDECTOMY       CARDIAC SURGERY         MEDICATIONS:   :   Prior to Admission Medications   Prescriptions Last Dose Informant Patient Reported? Taking?   POTASSIUM CHLORIDE PO prn at prn  Yes Yes   Sig: Take by mouth daily as needed Dosage unknown - takes PRN when he's having leg cramps   albuterol (PROAIR HFA/PROVENTIL HFA/VENTOLIN  HFA) 108 (90 Base) MCG/ACT inhaler prn at prn  Yes Yes   Sig: Inhale 2 puffs into the lungs every 6 hours as needed for shortness of breath / dyspnea or wheezing   atorvastatin (LIPITOR) 10 MG tablet 6/9/2022 at am  Yes Yes   Sig: Take 10 mg by mouth daily    fluticasone-salmeterol (ADVAIR) 500-50 MCG/ACT inhaler 6/9/2022 at am  Yes Yes   Sig: Inhale 1 puff into the lungs every 12 hours   hypromellose-dextran (ARTIFICAL TEARS) 0.1-0.3 % SOLN ophthalmic solution prn at prn  No Yes   Sig: Apply 2-3 drops to eye every hour as needed for dry eyes   ipratropium - albuterol 0.5 mg/2.5 mg/3 mL (DUONEB) 0.5-2.5 (3) MG/3ML neb solution prn at prn Daughter Yes Yes   Sig: Take 1 vial by nebulization every 6 hours as needed for shortness of breath / dyspnea or wheezing   montelukast (SINGULAIR) 10 MG tablet 6/8/2022 at pm Daughter Yes Yes   Sig: Take 10 mg by mouth every evening   omeprazole (PRILOSEC) 20 MG DR capsule 6/9/2022 at am  Yes Yes   Sig: Take 20 mg by mouth 2 times daily   prednisoLONE 5 MG tablet 6/9/2022 at am  Yes Yes   Sig: Take 1 tablet (5 mg) by mouth every other day, alternate days take 0.5 tablets (2.5 mg) by mouth.   spironolactone (ALDACTONE) 25 MG tablet 6/9/2022 at am  No Yes   Sig: Take 1 tablet (25 mg) by mouth daily   tiotropium (SPIRIVA) 18 MCG inhalation capsule 6/9/2022 at am  Yes Yes   Sig: Inhale 18 mcg into the lungs daily   torsemide (DEMADEX) 20 MG tablet 6/9/2022 at am  No Yes   Sig: Take 2 tablets (40 mg) by mouth daily   warfarin ANTICOAGULANT (COUMADIN) 2.5 MG tablet 6/9/2022 at am  No Yes   Sig: Skip your coumadin dose the evening of discharge, then take 2.5 mg on Tuesday and Wednesday, and have your INR checked Thursday as scheduled.   Patient taking differently: Has supply of 5 mg tabs: take 1/2 tablet (2.5 mg) by mouth every Monday, Wednesday, Friday, Saturday and take 1 tablet (5 mg) by mouth rest of the week      Facility-Administered Medications: None       ALLERGIES:   : No Known  "Allergies    SOCIAL HISTORY:  Social History     Tobacco Use     Smoking status: Former Smoker     Types: Cigarettes     Smokeless tobacco: Never Used   Vaping Use     Vaping Use: Never used   Substance Use Topics     Alcohol use: Not Currently     Comment: social     Drug use: No       FAMILY HISTORY:  No first degree relative with colon cancer, gastric cancer, crohn's disease, ulcerative colitis, or liver disease.     EXAM:  General Appearance: Alert, oriented x3, no acute distress.  /55   Pulse 63   Temp 98  F (36.7  C) (Oral)   Resp 18   Ht 1.422 m (4' 8\")   Wt 69.8 kg (153 lb 14.4 oz)   SpO2 95%   BMI 34.50 kg/m    Eye: sclera anicteric.  ENT: oropharynx clear, no thrush.  CV: RRR.  Resp: CTA bilaterally.  GI: Soft, NABS, NT/ND.  Musculoskeletal: no joint swelling, erythema, or warmth.  Neuro: no asterixis.      Labs and imaging reviewed    Recent Labs   Lab Test 06/10/22  1316 06/10/22  1031 06/10/22  0855 06/10/22  0404 06/10/22  0022 06/09/22 2205 09/06/21  0638 09/05/21  0712 09/04/21  1933   WBC  --   --   --   --   --  10.6  --  5.0 7.1   HGB 11.0* 10.7* 10.8*   < >  --  12.0*  --  13.1* 14.0   MCV  --   --   --   --   --  99  --  95 98   PLT  --   --   --   --   --  240  --  197 197   INR  --   --  3.43*  --  3.38*  --  3.45* 3.45* 4.43*    < > = values in this interval not displayed.     Recent Labs   Lab Test 06/09/22 2205 12/28/21  1459 12/20/21  1507   POTASSIUM 4.5 4.4 5.0   CHLORIDE 99 102 104   CO2 29 30 27   BUN 31* 28 27   ANIONGAP 8 3 6     Recent Labs   Lab Test 06/09/22 2205 09/04/21  2009 09/04/21  1933 08/10/21  1733 05/26/21  0312   ALBUMIN 3.4  --  3.2* 3.4  --    BILITOTAL 0.6  --  0.5 1.1  --    ALT 23  --  23 18  --    AST 12  --  19 14  --    PROTEIN  --  Negative  --   --  Negative       ASSESSMENT AND PLAN: 87 year old male with hematochezia, likely diverticular bleed.  May have stopped already given 12 hours without BM.      INR 3.4, ok with reversal or holding " coumadin and letting it drift down per primary team.  If INR <1.5 and still bleeding, could repeat colonoscopy.  OK with clear liquid diet.    Approximately 45 minutes of total time was spent providing patient care, including patient evaluation, reviewing documentation/test results, and .          Willy Perry MD  Thank you for the opportunity to participate in the care of this patient.   Please feel free to call with any questions or concerns.  [phone].       CC: [facility], Trinity Brown

## 2022-06-11 LAB
ANION GAP SERPL CALCULATED.3IONS-SCNC: 6 MMOL/L (ref 3–14)
BASOPHILS # BLD AUTO: 0 10E3/UL (ref 0–0.2)
BASOPHILS NFR BLD AUTO: 0 %
BUN SERPL-MCNC: 17 MG/DL (ref 7–30)
CALCIUM SERPL-MCNC: 7.5 MG/DL (ref 8.5–10.1)
CHLORIDE BLD-SCNC: 106 MMOL/L (ref 94–109)
CO2 SERPL-SCNC: 24 MMOL/L (ref 20–32)
CREAT SERPL-MCNC: 0.65 MG/DL (ref 0.66–1.25)
EOSINOPHIL # BLD AUTO: 0.1 10E3/UL (ref 0–0.7)
EOSINOPHIL NFR BLD AUTO: 2 %
ERYTHROCYTE [DISTWIDTH] IN BLOOD BY AUTOMATED COUNT: 13 % (ref 10–15)
GFR SERPL CREATININE-BSD FRML MDRD: >90 ML/MIN/1.73M2
GLUCOSE BLD-MCNC: 79 MG/DL (ref 70–99)
HCT VFR BLD AUTO: 28.8 % (ref 40–53)
HGB BLD-MCNC: 9.2 G/DL (ref 13.3–17.7)
HGB BLD-MCNC: 9.2 G/DL (ref 13.3–17.7)
IMM GRANULOCYTES # BLD: 0.1 10E3/UL
IMM GRANULOCYTES NFR BLD: 1 %
INR PPP: 2.23 (ref 0.85–1.15)
INR PPP: 3.02 (ref 0.85–1.15)
LYMPHOCYTES # BLD AUTO: 1.2 10E3/UL (ref 0.8–5.3)
LYMPHOCYTES NFR BLD AUTO: 14 %
MCH RBC QN AUTO: 32.5 PG (ref 26.5–33)
MCHC RBC AUTO-ENTMCNC: 31.9 G/DL (ref 31.5–36.5)
MCV RBC AUTO: 102 FL (ref 78–100)
MONOCYTES # BLD AUTO: 1.1 10E3/UL (ref 0–1.3)
MONOCYTES NFR BLD AUTO: 13 %
NEUTROPHILS # BLD AUTO: 6.1 10E3/UL (ref 1.6–8.3)
NEUTROPHILS NFR BLD AUTO: 70 %
NRBC # BLD AUTO: 0 10E3/UL
NRBC BLD AUTO-RTO: 0 /100
PLATELET # BLD AUTO: 200 10E3/UL (ref 150–450)
POTASSIUM BLD-SCNC: 4.4 MMOL/L (ref 3.4–5.3)
RBC # BLD AUTO: 2.83 10E6/UL (ref 4.4–5.9)
SODIUM SERPL-SCNC: 136 MMOL/L (ref 133–144)
WBC # BLD AUTO: 8.7 10E3/UL (ref 4–11)

## 2022-06-11 PROCEDURE — 82310 ASSAY OF CALCIUM: CPT | Performed by: STUDENT IN AN ORGANIZED HEALTH CARE EDUCATION/TRAINING PROGRAM

## 2022-06-11 PROCEDURE — 250N000009 HC RX 250: Performed by: PHYSICIAN ASSISTANT

## 2022-06-11 PROCEDURE — 250N000012 HC RX MED GY IP 250 OP 636 PS 637: Performed by: STUDENT IN AN ORGANIZED HEALTH CARE EDUCATION/TRAINING PROGRAM

## 2022-06-11 PROCEDURE — 258N000003 HC RX IP 258 OP 636: Performed by: INTERNAL MEDICINE

## 2022-06-11 PROCEDURE — 85004 AUTOMATED DIFF WBC COUNT: CPT | Performed by: STUDENT IN AN ORGANIZED HEALTH CARE EDUCATION/TRAINING PROGRAM

## 2022-06-11 PROCEDURE — 250N000013 HC RX MED GY IP 250 OP 250 PS 637: Performed by: STUDENT IN AN ORGANIZED HEALTH CARE EDUCATION/TRAINING PROGRAM

## 2022-06-11 PROCEDURE — 36415 COLL VENOUS BLD VENIPUNCTURE: CPT | Performed by: PHYSICIAN ASSISTANT

## 2022-06-11 PROCEDURE — 85018 HEMOGLOBIN: CPT | Performed by: PHYSICIAN ASSISTANT

## 2022-06-11 PROCEDURE — 85610 PROTHROMBIN TIME: CPT | Performed by: PHYSICIAN ASSISTANT

## 2022-06-11 PROCEDURE — 250N000013 HC RX MED GY IP 250 OP 250 PS 637: Performed by: INTERNAL MEDICINE

## 2022-06-11 PROCEDURE — 36415 COLL VENOUS BLD VENIPUNCTURE: CPT | Performed by: STUDENT IN AN ORGANIZED HEALTH CARE EDUCATION/TRAINING PROGRAM

## 2022-06-11 PROCEDURE — 250N000013 HC RX MED GY IP 250 OP 250 PS 637: Performed by: PHYSICIAN ASSISTANT

## 2022-06-11 PROCEDURE — 250N000011 HC RX IP 250 OP 636: Performed by: PHYSICIAN ASSISTANT

## 2022-06-11 PROCEDURE — 120N000001 HC R&B MED SURG/OB

## 2022-06-11 PROCEDURE — G0378 HOSPITAL OBSERVATION PER HR: HCPCS

## 2022-06-11 PROCEDURE — 99233 SBSQ HOSP IP/OBS HIGH 50: CPT | Performed by: PHYSICIAN ASSISTANT

## 2022-06-11 RX ORDER — CEPHALEXIN 500 MG/1
500 CAPSULE ORAL EVERY 6 HOURS SCHEDULED
Status: DISCONTINUED | OUTPATIENT
Start: 2022-06-11 | End: 2022-06-13 | Stop reason: HOSPADM

## 2022-06-11 RX ADMIN — MONTELUKAST 10 MG: 10 TABLET, FILM COATED ORAL at 23:18

## 2022-06-11 RX ADMIN — FLUTICASONE FUROATE AND VILANTEROL TRIFENATATE 1 PUFF: 200; 25 POWDER RESPIRATORY (INHALATION) at 07:44

## 2022-06-11 RX ADMIN — CEPHALEXIN 500 MG: 500 CAPSULE ORAL at 12:23

## 2022-06-11 RX ADMIN — ACETAMINOPHEN 650 MG: 325 TABLET ORAL at 23:21

## 2022-06-11 RX ADMIN — PHYTONADIONE 5 MG: 10 INJECTION, EMULSION INTRAMUSCULAR; INTRAVENOUS; SUBCUTANEOUS at 10:30

## 2022-06-11 RX ADMIN — SODIUM CHLORIDE: 9 INJECTION, SOLUTION INTRAVENOUS at 08:27

## 2022-06-11 RX ADMIN — CEPHALEXIN 500 MG: 500 CAPSULE ORAL at 23:18

## 2022-06-11 RX ADMIN — UMECLIDINIUM 1 PUFF: 62.5 AEROSOL, POWDER ORAL at 07:44

## 2022-06-11 RX ADMIN — SODIUM CHLORIDE: 9 INJECTION, SOLUTION INTRAVENOUS at 21:31

## 2022-06-11 RX ADMIN — ATORVASTATIN CALCIUM 10 MG: 10 TABLET, FILM COATED ORAL at 07:41

## 2022-06-11 RX ADMIN — ACETAMINOPHEN 650 MG: 325 TABLET ORAL at 10:32

## 2022-06-11 RX ADMIN — SPIRONOLACTONE 25 MG: 25 TABLET ORAL at 07:42

## 2022-06-11 RX ADMIN — CEPHALEXIN 500 MG: 500 CAPSULE ORAL at 18:21

## 2022-06-11 RX ADMIN — ACETAMINOPHEN 650 MG: 325 TABLET ORAL at 17:04

## 2022-06-11 RX ADMIN — PANTOPRAZOLE SODIUM 40 MG: 20 TABLET, DELAYED RELEASE ORAL at 07:42

## 2022-06-11 RX ADMIN — PREDNISONE 5 MG: 5 TABLET ORAL at 08:08

## 2022-06-11 RX ADMIN — FUROSEMIDE 20 MG: 20 TABLET ORAL at 07:41

## 2022-06-11 ASSESSMENT — ACTIVITIES OF DAILY LIVING (ADL)
CHANGE_IN_FUNCTIONAL_STATUS_SINCE_ONSET_OF_CURRENT_ILLNESS/INJURY: NO
ADLS_ACUITY_SCORE: 27
CONCENTRATING,_REMEMBERING_OR_MAKING_DECISIONS_DIFFICULTY: NO
DIFFICULTY_EATING/SWALLOWING: NO
DRESSING/BATHING_DIFFICULTY: NO
ADLS_ACUITY_SCORE: 27
NUMBER_OF_TIMES_PATIENT_HAS_FALLEN_WITHIN_LAST_SIX_MONTHS: 1
ADLS_ACUITY_SCORE: 27
WALKING_OR_CLIMBING_STAIRS_DIFFICULTY: NO
TOILETING_ISSUES: NO
ADLS_ACUITY_SCORE: 27
FALL_HISTORY_WITHIN_LAST_SIX_MONTHS: YES
DOING_ERRANDS_INDEPENDENTLY_DIFFICULTY: NO
WEAR_GLASSES_OR_BLIND: NO
ADLS_ACUITY_SCORE: 27

## 2022-06-11 NOTE — PROGRESS NOTES
[facility]  Gastroenterology Progress Note        Prateek Houston MD    Patient Name: Thierry Samuel MRN# [id number]   YOB: 1935 Age: 87 year old      Date of Admission:  6/9/2022  Today's Date: 06/11/2022        Interval History:        Chart reviewed including consult yesterday from Dr. Perry.  Patient continues to have intermittent hematochezia which seems worse according to the patient's daughter.  We discussed the patient's bleeding in [place].  Patient otherwise has no complaints.  His INR is pending today but he has not been reversed.  Hemodynamics are stable.  Tolerates clear liquids although he does have ongoing bleeding.         Assessment and Plan:        Impression:  -87-year-old gentleman with likely diverticular bleed exacerbated by elevated INR due to Coumadin administration.  Hematochezia continues with hemoglobin now down to 9.2.  However, hemodynamics are stable and the patient is otherwise asymptomatic from a GI perspective.    Recommendations:  -Recommend correcting INR to normal.  -Hold Coumadin indefinitely given the GI bleeding including recurrent what appears to be diverticular bleeding.  -Patient is in normal sinus rhythm with a pacemaker so holding the Coumadin should not be significantly dangerous right now.  -Would make n.p.o. today except sips of liquids.  -Monitor hemoglobin and hematocrit.  -Bleeding should stop once the INR is corrected.  -Colonoscopy of little to no value with elevated INR let alone the low likelihood of successful therapeutics and diverticular bleeding.  -If significant bleeding is noted, would recommend CT angiogram followed by interventional radiology if appropriate.  -Monitor hemoglobin and hematocrit and transfuse as indicated.  -Hopefully can restart diet tomorrow morning.  -Case and findings discussed with admitting hospital team.  -Will follow while hospitalized.  Please call any questions.                    Physical Exam:        Vital Signs with Ranges  Temp:  [97.7  F (36.5  C)-98.3  F (36.8  C)] 98.3  F (36.8  C)  Pulse:  [60-72] 60  Resp:  [14-18] 18  BP: (106-135)/(54-67) 135/55  SpO2:  [95 %-99 %] 99 %  I/O's Last 24 hours  I/O last 3 completed shifts:  In: 978 [I.V.:978]  Out: -     Constitutional:  Alert and no distress.   HEENT: PERRL, EOMI, sclera nonicteric, conjunctiva pink and moist.  NECK: Supple, nontender. No lymphadenopathy, JVD or bruits noted.  PULMONARY: Clear to auscultation bilaterally.  CARDIOVASCULAR: S1, S2, no S3, no S4, no murmur, regular rate and rhythm  ABDOMEN: Soft, nontender, nondistended, no tympany, no organomegaly, no fullness, guarding or rebound are noted.   NEURO: Alert and oriented to place, time and person. Neurological exam grossly nonfocal, CN II through XII are grossly intact. Detailed neurological exam is not performed.  EXT: No cyanosis, clubbing or edema.         Medications:          atorvastatin  10 mg Oral Daily     fluticasone-vilanterol  1 puff Inhalation Daily     furosemide  20 mg Oral Daily     montelukast  10 mg Oral QPM     pantoprazole  40 mg Oral BID AC     phytonadione  5 mg Oral Once     predniSONE  5 mg Oral Every Other Day     spironolactone  25 mg Oral Daily     umeclidinium  1 puff Inhalation Daily     PRN Meds: acetaminophen **OR** acetaminophen, albuterol, hypromellose-dextran, ipratropium - albuterol 0.5 mg/2.5 mg/3 mL, melatonin, ondansetron **OR** ondansetron         Data:      All new lab and imaging data was reviewed.  .  Recent Labs   Lab Test 06/11/22  0525 06/10/22  1316 06/10/22  1031 06/10/22  0855 06/10/22  0404 06/10/22  0022 06/09/22  2205 09/06/21  0638 09/05/21  0712   WBC 8.7  --   --   --   --   --  10.6  --  5.0   HGB 9.2* 11.0* 10.7* 10.8*   < >  --  12.0*  --  13.1*   *  --   --   --   --   --  99  --  95     --   --   --   --   --  240  --  197   INR  --   --   --  3.43*  --  3.38*  --  3.45* 3.45*    < > = values in  this interval not displayed.     Recent Labs   Lab Test 06/11/22 0525 06/09/22 2205 12/28/21  1459    136 135   POTASSIUM 4.4 4.5 4.4   CHLORIDE 106 99 102   CO2 24 29 30   BUN 17 31* 28   CR 0.65* 0.86 1.07   ANIONGAP 6 8 3     Recent Labs   Lab Test 06/09/22 2205 09/04/21 2009 09/04/21  1933 08/10/21  1733 05/26/21  0312   ALBUMIN 3.4  --  3.2* 3.4  --    BILITOTAL 0.6  --  0.5 1.1  --    ALT 23  --  23 18  --    AST 12  --  19 14  --    ALKPHOS 73  --  104 91  --    PROTEIN  --  Negative  --   --  Negative            Prateek Houston MD, FACG  [facility]  [phone]

## 2022-06-11 NOTE — PLAN OF CARE
PRIMARY DIAGNOSIS: GI BLEED    OUTPATIENT/OBSERVATION GOALS TO BE MET BEFORE DISCHARGE  1. Orthostatic performed: No    2. Stable Hgb Yes.   Recent Labs   Lab Test 06/10/22  1316 06/10/22  1031 06/10/22  0855   HGB 11.0* 10.7* 10.8*       3. Resolved or declined bleeding episodes: No,  with a small amount of bleeding Last episode: 2030 tonight    4. Appropriate testing complete: Yes    5. Cleared for discharge by consultants (if involved): No    6. Safe discharge environment identified: Yes    Discharge Planner Nurse   Safe discharge environment identified: Yes  Barriers to discharge: Yes       Entered by: Virgie Santos RN 06/11/2022 12:49 AM    Pt A/O x4 makes needs known. SBA with ambulating. Clear liquid diet. IVF infusing. Has own CPAP on for SUSY. Is in the room and appears to be sleeping. Continue to provide supportive cares.  Please review provider order for any additional goals.   Nurse to notify provider when observation goals have been met and patient is ready for discharge.

## 2022-06-11 NOTE — PLAN OF CARE
PRIMARY DIAGNOSIS: BLOOD IN STOOLS    OUTPATIENT/OBSERVATION GOALS TO BE MET BEFORE DISCHARGE  Orthostatic performed: No    Stable Hgb Yes  Recent Labs   Lab Test 06/11/22  0525 06/10/22  1316 06/10/22  1031   HGB 9.2* 11.0* 10.7*       Resolved or declined bleeding episodes: No,  with a moderate amount of bleeding Last episode: 6/11/22    Appropriate testing complete: Yes    Cleared for discharge by consultants (if involved): No    Safe discharge environment identified: Yes    Discharge Planner Nurse   Safe discharge environment identified: Yes  Barriers to discharge: Yes       Entered by: Zaina Sánchez RN 06/11/2022 9:45 AM     Please review provider order for any additional goals. Nurse to notify provider when observation goals have been met and patient is ready for discharge.    Patient up independently. [place]an speaking. Daughter in room helping patient with all cares and translations. Patient showered this AM. Telemetry monitor in place: v. paced with a HR in the 60's. NPO. Right IV site infusing with NS. Uses CPAP at night.

## 2022-06-11 NOTE — PLAN OF CARE
Shift 8913-4183  Primary problem: Painless bloody stools     Patient up independently, with daughter in room, and is Moldovan speaking. NPO. Right IV site infusing with NS. Telemetry monitor in place. RLE reddened and bruised, utilized ice to decrease swelling. Gave PRN TYLENOL to patient twice during this shift, as patient reports pain in RLE. No complaints this shift otherwise. Blood still present in stools.     Vital signs:  Temp: 98.3  F (36.8  C) Temp src: Oral BP: 104/58 Pulse: 60   Resp: 16 SpO2: 98 % O2 Device: None (Room air)       Zaina Sánchez RN

## 2022-06-11 NOTE — PLAN OF CARE
PRIMARY DIAGNOSIS: GI BLEED    OUTPATIENT/OBSERVATION GOALS TO BE MET BEFORE DISCHARGE  1. Orthostatic performed: No    2. Stable Hgb Yes.   Recent Labs   Lab Test 06/10/22  1316 06/10/22  1031 06/10/22  0855   HGB 11.0* 10.7* 10.8*       3. Resolved or declined bleeding episodes: No,  with a small amount of bleeding Last episode: 2030 tonight    4. Appropriate testing complete: Yes    5. Cleared for discharge by consultants (if involved): No    6. Safe discharge environment identified: Yes    Discharge Planner Nurse   Safe discharge environment identified: Yes  Barriers to discharge: Yes       Entered by: Virgie Santos RN 06/11/2022 5:13 AM    Pt A/O x4 makes needs known. SBA with ambulating. Clear liquid diet. IVF infusing. Has own CPAP on for SUSY. Is in the room and appears to be sleeping. Daughter in the room to help translate cares as pt speaks solely English. On tele: per tele tech V pace 60's-70's. POC: BMP, CBC with platelets and differential, INR draw. Continue to provide supportive cares.  Please review provider order for any additional goals.   Nurse to notify provider when observation goals have been met and patient is ready for discharge.

## 2022-06-11 NOTE — PLAN OF CARE
PRIMARY DIAGNOSIS: BLOODY STOOLS + FALLS     OUTPATIENT/OBSERVATION GOALS TO BE MET BEFORE DISCHARGE  Orthostatic performed: No    Stable Hgb Yes.   Recent Labs   Lab Test 06/11/22  0525 06/10/22  1316 06/10/22  1031   HGB 9.2* 11.0* 10.7*       Resolved or declined bleeding episodes: Yes Last episode: TODAY 6/11/22    Appropriate testing complete: Yes    Cleared for discharge by consultants (if involved): No    Safe discharge environment identified: Yes    Discharge Planner Nurse   Safe discharge environment identified: Yes  Barriers to discharge: Yes       Entered by: Zaina Sánchez RN 06/11/2022 1:01 PM     Please review provider order for any additional goals. Nurse to notify provider when observation goals have been met and patient is ready for discharge.    Patient up independently with daughter in room. Swazi speaking. Diuretics held, IVF running in right arm. PRN Tylenol given for RLE pain. Started on KEFFLEX for RLE infection. Pacemaker in place. Tele monitor: atrial fibrillation CVR. Incentive spirometer given to patient for lung rehabilitation.

## 2022-06-11 NOTE — PLAN OF CARE
PRIMARY DIAGNOSIS: GI BLEED    OUTPATIENT/OBSERVATION GOALS TO BE MET BEFORE DISCHARGE  Orthostatic performed: No    Stable Hgb Yes.   Recent Labs   Lab Test 06/10/22  1316 06/10/22  1031 06/10/22  0855   HGB 11.0* 10.7* 10.8*       Resolved or declined bleeding episodes: No,  with a small amount of bleeding Last episode: 2030 tonight    Appropriate testing complete: Yes    Cleared for discharge by consultants (if involved): No    Safe discharge environment identified: Yes    Discharge Planner Nurse   Safe discharge environment identified: Yes  Barriers to discharge: Yes       Entered by: Virgie Santos RN 06/10/2022 11:10 PM    Pt A/O x4 makes needs known. Here for rectal bleeding and had an episode of bleeding at 2030; stool was black, formed, and had small blood clots that were dark red in color. Pt denies abdominal pain. SBA with mobility; daughter is in the room to help with translating. Pt has pain in BLE; per daughter pain is chronic. Had a fall on the 5th and has multiple bruises and abrasions as a result. Crackles noted in right lower lobe; per daughter states hes on 2 diuretics at home. Paged and now has an order for furosemide and spironolactone. IVF infusing. Has own CPAP on for SUSY. Continue to provide supportive cares.  Please review provider order for any additional goals.   Nurse to notify provider when observation goals have been met and patient is ready for discharge.   Left arm;

## 2022-06-11 NOTE — PROGRESS NOTES
[facility]  Hospitalist Progress Note  Wendy Ford PA-C 06/11/2022    Reason for Stay (Diagnosis): Continued painless bright red blood per rectum         Assessment and Plan:      Summary of Stay: Thierry Samuel is a Portuguese-speaking medically complex 87 year old male with history of heart failure with preserved ejection fraction, history of A. fib on Coumadin with ppm placement, obstructive sleep apnea with CPAP use, pulmonary hypertension, hypertension COPD and GERD use admitted on 6/9/2022 with painless bright red blood per rectum.  On admission he is afebrile with pressure 108/53, pulse 61 with ventricular paced rhythm and breathing comfortably on room air without hypoxia.  Initial hemoglobin was 10.8 which stayed stable but given continued blood in his stool is now 9.2.  GI consulted with recommendation to reverse INR and oral vitamin K was given.    #Painless bright red blood per rectum suspect diverticular bleed  -Patient has had bright red blood per rectum for the past week  -On 6/9 presented to the ED with complaints of lightheadedness and decreased energy, admission INR of 3.38  -Initial hemoglobin was 10.8 and recommendation was to observe while holding warfarin  -Unfortunately patient continues to bleed and GI is recommending vitamin K reversal  -Phytonadione 5 mg oral given on 6/11 with INR of 3.02  -Hemoglobin this a.m. 9.2, patient has already filled out blood consent form  -We will recheck hemoglobin and INR this evening, possibly more vitamin K this evening if needed  -GI following with request to keep n.p.o. for now  -Patient is receiving normal saline at 75 mL/h but we may need to hold this if he develops hypoxia or fluid overload    #Acute blood loss anemia  -Due to GI bleed  -Mildly symptomatic with lightheadedness with ambulation  -Blood consent form filled out as above with plans to transfuse should he become unstable or drop below 7    #Heart failure with  preserved ejection fraction  -Last echo in August 2021 showing moderately dilated right ventricle, moderate to severe biatrial enlargement and ejection fraction of 60 to 65%  -Normally is maintained on torsemide 40 mg daily and spironolactone 25 mg daily which he received on 6/10 while on IV fluids  -Lungs do sound coarse but he is not requiring any oxygen nor complaining of shortness of breath  -Given we are trying to maintain fluid balance I will hold diuretics while he is on IV fluids.  Should he become hypoxic or short of breath we can consider resuming  these after stopping IV fluids.    #Suspected right leg cellulitis  -Patient had a fall approximately 5 days prior to admission resulting in an injury to his right anterior calf.  His daughter has noticed increased nondraining erythema around this injury along with tenderness to palpation  -It certainly could be cellulitis so will start Keflex    #History of A. fib s/p pacemaker placement  -Currently in normal sinus rhythm  -Telemetry monitoring in place  -Holding warfarin  -Need to discuss prior to discharge consideration of stopping anticoagulants indefinitely given recurrent GI bleed    #COPD  #Sleep apnea  #Pulmonary hypertension  -Continue PTA Spiriva and Advair  -CPAP use at night    #Hypertension  -Is managed with diuretics which we are holding for now    #GERD  -Continue Omeprazole    #HLP  -Continue atorvastatin    Daughter is at bedside who helps with interpretation        DVT Prophylaxis: Holding warfarin, legs are tender to touch so we will not do PCD's at this time  Code Status: Full Code  Discharge Dispo: Anticipate will stay 2-3 more days          Interval History (Subjective):      Patient uses his daughter as .  He is continue to have blood in his stool without abdominal pain.  He does feel lightheaded with walking but not with standing.  He has had a mild cough with clear sputum.  He denies fever and shortness of breath.  Daughter  "has noted some new redness on his anterior lower leg that is tender to touch.                  Physical Exam:      Last Vital Signs:  /55 (BP Location: Left arm)   Pulse 60   Temp 98.3  F (36.8  C) (Oral)   Resp 18   Ht 1.422 m (4' 8\")   Wt 69.8 kg (153 lb 14.4 oz)   SpO2 99%   BMI 34.50 kg/m        Intake/Output Summary (Last 24 hours) at 6/11/2022 1344  Last data filed at 6/10/2022 2113  Gross per 24 hour   Intake 978 ml   Output --   Net 978 ml       Constitutional: Awake, alert, cooperative, no apparent distress   Respiratory: Coarse breath sounds bilateral   Cardiovascular: Regular rate and rhythm, normal S1 and S2, and no murmur noted   Abdomen: Normal bowel sounds, soft, non-distended, non-tender   Skin:  Patient has nonbleeding traumatic injury of anterior right leg with some surrounding erythema that is tender to touch   Neuro: Alert and oriented x3, no weakness, numbness, memory loss   Extremities: No edema, normal range of motion   Other(s):        All other systems: Negative          Medications:      All current medications were reviewed with changes reflected in problem list.         Data:      All new lab and imaging data was reviewed.   Labs:  Recent Labs   Lab 06/11/22  0525      POTASSIUM 4.4   CHLORIDE 106   CO2 24   ANIONGAP 6   GLC 79   BUN 17   CR 0.65*   GFRESTIMATED >90   CAMPOS 7.5*     Recent Labs   Lab 06/11/22  0525   WBC 8.7   HGB 9.2*   HCT 28.8*   *         Imaging:   No results found for this or any previous visit (from the past 24 hour(s)).    "

## 2022-06-12 LAB
ANION GAP SERPL CALCULATED.3IONS-SCNC: 7 MMOL/L (ref 3–14)
BUN SERPL-MCNC: 14 MG/DL (ref 7–30)
CALCIUM SERPL-MCNC: 7.3 MG/DL (ref 8.5–10.1)
CHLORIDE BLD-SCNC: 111 MMOL/L (ref 94–109)
CO2 SERPL-SCNC: 20 MMOL/L (ref 20–32)
CREAT SERPL-MCNC: 0.59 MG/DL (ref 0.66–1.25)
ERYTHROCYTE [DISTWIDTH] IN BLOOD BY AUTOMATED COUNT: 13.2 % (ref 10–15)
GFR SERPL CREATININE-BSD FRML MDRD: >90 ML/MIN/1.73M2
GLUCOSE BLD-MCNC: 80 MG/DL (ref 70–99)
HCT VFR BLD AUTO: 27.5 % (ref 40–53)
HGB BLD-MCNC: 8.8 G/DL (ref 13.3–17.7)
INR PPP: 1.55 (ref 0.85–1.15)
MCH RBC QN AUTO: 32.5 PG (ref 26.5–33)
MCHC RBC AUTO-ENTMCNC: 32 G/DL (ref 31.5–36.5)
MCV RBC AUTO: 102 FL (ref 78–100)
PLATELET # BLD AUTO: 214 10E3/UL (ref 150–450)
POTASSIUM BLD-SCNC: 4 MMOL/L (ref 3.4–5.3)
RBC # BLD AUTO: 2.71 10E6/UL (ref 4.4–5.9)
SODIUM SERPL-SCNC: 138 MMOL/L (ref 133–144)
WBC # BLD AUTO: 7.5 10E3/UL (ref 4–11)

## 2022-06-12 PROCEDURE — 250N000013 HC RX MED GY IP 250 OP 250 PS 637: Performed by: INTERNAL MEDICINE

## 2022-06-12 PROCEDURE — 250N000013 HC RX MED GY IP 250 OP 250 PS 637: Performed by: PHYSICIAN ASSISTANT

## 2022-06-12 PROCEDURE — 85610 PROTHROMBIN TIME: CPT | Performed by: PHYSICIAN ASSISTANT

## 2022-06-12 PROCEDURE — 36415 COLL VENOUS BLD VENIPUNCTURE: CPT | Performed by: PHYSICIAN ASSISTANT

## 2022-06-12 PROCEDURE — 250N000013 HC RX MED GY IP 250 OP 250 PS 637: Performed by: STUDENT IN AN ORGANIZED HEALTH CARE EDUCATION/TRAINING PROGRAM

## 2022-06-12 PROCEDURE — 85027 COMPLETE CBC AUTOMATED: CPT | Performed by: PHYSICIAN ASSISTANT

## 2022-06-12 PROCEDURE — 80048 BASIC METABOLIC PNL TOTAL CA: CPT | Performed by: PHYSICIAN ASSISTANT

## 2022-06-12 PROCEDURE — 258N000003 HC RX IP 258 OP 636: Performed by: INTERNAL MEDICINE

## 2022-06-12 PROCEDURE — 99232 SBSQ HOSP IP/OBS MODERATE 35: CPT | Performed by: PHYSICIAN ASSISTANT

## 2022-06-12 PROCEDURE — 120N000001 HC R&B MED SURG/OB

## 2022-06-12 RX ORDER — ACETAMINOPHEN 325 MG/1
650 TABLET ORAL ONCE
Status: COMPLETED | OUTPATIENT
Start: 2022-06-12 | End: 2022-06-12

## 2022-06-12 RX ADMIN — CEPHALEXIN 500 MG: 500 CAPSULE ORAL at 18:23

## 2022-06-12 RX ADMIN — SODIUM CHLORIDE: 9 INJECTION, SOLUTION INTRAVENOUS at 08:46

## 2022-06-12 RX ADMIN — ACETAMINOPHEN 650 MG: 325 TABLET ORAL at 18:22

## 2022-06-12 RX ADMIN — UMECLIDINIUM 1 PUFF: 62.5 AEROSOL, POWDER ORAL at 08:46

## 2022-06-12 RX ADMIN — CEPHALEXIN 500 MG: 500 CAPSULE ORAL at 06:32

## 2022-06-12 RX ADMIN — ACETAMINOPHEN 650 MG: 325 TABLET ORAL at 21:41

## 2022-06-12 RX ADMIN — ATORVASTATIN CALCIUM 10 MG: 10 TABLET, FILM COATED ORAL at 08:46

## 2022-06-12 RX ADMIN — CEPHALEXIN 500 MG: 500 CAPSULE ORAL at 12:21

## 2022-06-12 RX ADMIN — ACETAMINOPHEN 650 MG: 325 TABLET ORAL at 08:46

## 2022-06-12 RX ADMIN — PANTOPRAZOLE SODIUM 40 MG: 20 TABLET, DELAYED RELEASE ORAL at 16:55

## 2022-06-12 RX ADMIN — MONTELUKAST 10 MG: 10 TABLET, FILM COATED ORAL at 20:58

## 2022-06-12 RX ADMIN — FLUTICASONE FUROATE AND VILANTEROL TRIFENATATE 1 PUFF: 200; 25 POWDER RESPIRATORY (INHALATION) at 08:46

## 2022-06-12 RX ADMIN — PANTOPRAZOLE SODIUM 40 MG: 20 TABLET, DELAYED RELEASE ORAL at 08:46

## 2022-06-12 ASSESSMENT — ACTIVITIES OF DAILY LIVING (ADL)
ADLS_ACUITY_SCORE: 24
ADLS_ACUITY_SCORE: 27
ADLS_ACUITY_SCORE: 24
ADLS_ACUITY_SCORE: 27
ADLS_ACUITY_SCORE: 24
ADLS_ACUITY_SCORE: 24
ADLS_ACUITY_SCORE: 27
ADLS_ACUITY_SCORE: 27
ADLS_ACUITY_SCORE: 24
ADLS_ACUITY_SCORE: 24
ADLS_ACUITY_SCORE: 27
ADLS_ACUITY_SCORE: 24

## 2022-06-12 NOTE — PLAN OF CARE
Shift [id number]  Primary problem: Bloody stools    Patient independent in room with his daughter. Italian speaking - daughter translates. Patient takes PRN TYLENOL for pain in RLE, ice also utilized at times. Patient is bruised on bilateral upper extremities and lower extremities. Right scab/skin tear to right knee. Right IV site saline locked. Patient on a clear liquid diet.    Telemetry monitorin: v paced with underlying atrial fibrillation  1200: v paced  1600: discontinued, N/A    Zaina Sánchez RN

## 2022-06-12 NOTE — PROGRESS NOTES
[facility]  Gastroenterology Progress Note        Prateek Houston MD    Patient Name: Thierry Samuel MRN# [id number]   YOB: 1935 Age: 87 year old      Date of Admission:  6/9/2022  Today's Date: 06/12/2022        Interval History:        Chart reviewed.  Hemoglobin stable with no transfusion.  Vitamin K administered with slow decrease in INR.  INR 2.3 today.  2 bloody bowel movements overnight.  Daughter reports some fecal material less blood.  No abdominal pain.  No other clinical complaints from a GI perspective.         Assessment and Plan:        Impression:  -87-year-old gentleman with likely diverticular bleed exacerbated by elevated INR due to Coumadin administration.  Hematochezia continues, seemingly decreased now with some stool particles.  With hemoglobin now down to 8.8.  However, hemodynamics are stable and the patient is otherwise asymptomatic from a GI perspective.    Recommendations:  -Recommend correcting INR to normal.  -Hold Coumadin indefinitely given the GI bleeding including recurrent what appears to be diverticular bleeding.  -Patient is in normal sinus rhythm with a pacemaker so holding the Coumadin should not be significantly dangerous right now.  -Try clear liquid diet today.  Order placed.  -Monitor hemoglobin and hematocrit.  -Bleeding should stop once the INR is corrected.  -Colonoscopy of little to no value with elevated INR let alone the low likelihood of successful therapeutics and diverticular bleeding.  -If significant bleeding is noted, would recommend CT angiogram followed by interventional radiology if appropriate.  -Monitor hemoglobin and hematocrit and transfuse as indicated.  -Hopefully can restart diet tomorrow morning.  -Case and findings discussed with admitting hospital team.  -Will follow while hospitalized.  Please call any questions.                   Physical Exam:        Vital Signs with Ranges  Temp:  [97.4  F (36.3   C)-98.3  F (36.8  C)] 97.4  F (36.3  C)  Pulse:  [60-69] 60  Resp:  [14-16] 16  BP: (104-130)/(56-62) 125/56  SpO2:  [96 %-100 %] 100 %  I/O's Last 24 hours  No intake/output data recorded.    Constitutional:  Alert and no distress.   HEENT: PERRL, EOMI, sclera nonicteric, conjunctiva pink and moist.  NECK: Supple, nontender. No lymphadenopathy, JVD or bruits noted.  PULMONARY: Clear to auscultation bilaterally.  CARDIOVASCULAR: S1, S2, no S3, no S4, no murmur, regular rate and rhythm  ABDOMEN: Soft, nontender, nondistended, no tympany, no organomegaly, no fullness, guarding or rebound are noted.   NEURO: Alert and oriented to place, time and person. Neurological exam grossly nonfocal, CN II through XII are grossly intact. Detailed neurological exam is not performed.  EXT: No cyanosis, clubbing or edema.         Medications:          atorvastatin  10 mg Oral Daily     cephALEXin  500 mg Oral Q6H JONAS     fluticasone-vilanterol  1 puff Inhalation Daily     [Held by provider] furosemide  20 mg Oral Daily     montelukast  10 mg Oral QPM     pantoprazole  40 mg Oral BID AC     predniSONE  5 mg Oral Every Other Day     [Held by provider] spironolactone  25 mg Oral Daily     umeclidinium  1 puff Inhalation Daily     PRN Meds: acetaminophen **OR** acetaminophen, albuterol, hypromellose-dextran, ipratropium - albuterol 0.5 mg/2.5 mg/3 mL, melatonin, ondansetron **OR** ondansetron         Data:      All new lab and imaging data was reviewed.  .  Recent Labs   Lab Test 06/12/22  0548 06/11/22  2101 06/11/22  1005 06/11/22  0525 06/10/22  1031 06/10/22  0855 06/10/22  0022 06/09/22  2205   WBC 7.5  --   --  8.7  --   --   --  10.6   HGB 8.8* 9.2*  --  9.2*   < > 10.8*   < > 12.0*   *  --   --  102*  --   --   --  99     --   --  200  --   --   --  240   INR  --  2.23* 3.02*  --   --  3.43*   < >  --     < > = values in this interval not displayed.     Recent Labs   Lab Test 06/12/22  0548 06/11/22  0525  06/09/22 2205    136 136   POTASSIUM 4.0 4.4 4.5   CHLORIDE 111* 106 99   CO2 20 24 29   BUN 14 17 31*   CR 0.59* 0.65* 0.86   ANIONGAP 7 6 8     Recent Labs   Lab Test 06/09/22 2205 09/04/21 2009 09/04/21  1933 08/10/21  1733 05/26/21  0312   ALBUMIN 3.4  --  3.2* 3.4  --    BILITOTAL 0.6  --  0.5 1.1  --    ALT 23  --  23 18  --    AST 12  --  19 14  --    ALKPHOS 73  --  104 91  --    PROTEIN  --  Negative  --   --  Negative            Prateek Houston MD, FACG  [facility]  [phone]

## 2022-06-12 NOTE — PROGRESS NOTES
[facility]  Hospitalist Progress Note  Wendy Ford PA-C 06/12/2022    Reason for Stay (Diagnosis): Continued painless bright red blood per rectum         Assessment and Plan:      Summary of Stay: Thierry Samuel is a Maori-speaking medically complex 87 year old male with history of heart failure with preserved ejection fraction, history of A. fib on Coumadin with ppm placement, obstructive sleep apnea with CPAP use, pulmonary hypertension, hypertension COPD and GERD use admitted on 6/9/2022 with painless bright red blood per rectum.  On admission he is afebrile with pressure 108/53, pulse 61 with ventricular paced rhythm and breathing comfortably on room air without hypoxia.  Initial hemoglobin was 10.8 which stayed stable but given continued blood in his stool is now 9.2.  GI consulted with recommendation to reverse INR and oral vitamin K was given.      #Painless bright red blood per rectum suspect diverticular bleed  -Patient has had bright red blood per rectum for the past week  -On 6/9 presented to the ED with complaints of lightheadedness and decreased energy, admission INR of 3.38  -Initial hemoglobin was 10.8 and initial recommendation was to observe while holding warfarin  -Patient continued to bleed and GI recommended vitamin K reversal  -Phytonadione 5 mg oral given on 6/11 with INR of 3.02  -INR today pending but GI requesting further reversal until INR normal.   -Dark blood in stool suggesting not acute bleeding  -Advance to clear liquids  -Putting fluids on hold for now     #Acute blood loss anemia  -Due to GI bleed, admission Hgb of 11 and now 8.8 (was 9.2 yesterday)  -Mildly symptomatic with lightheadedness with ambulation  -Blood consent form filled out as above with plans to transfuse should he become unstable or drop below 7     #Heart failure with preserved ejection fraction  -Last echo in August 2021 showing moderately dilated right ventricle, moderate to  "severe biatrial enlargement and ejection fraction of 60 to 65%  -Normally is maintained on torsemide 40 mg daily and spironolactone 25 mg daily which he received on 6/10 while on IV fluids  -Lungs do sound coarse but he is not requiring any oxygen nor complaining of shortness of breath  -Hold fluids and diuretics today. May consider resuming diuretics later tonight or tomorrow     #Suspected right leg cellulitis  -Patient had a fall approximately 5 days prior to admission resulting in an injury to his right anterior calf.  His daughter has noticed increased nondraining erythema around this injury along with tenderness to palpation  -It certainly could be cellulitis so started Keflex on 6/11 with improvement     #History of A. fib s/p pacemaker placement  -Currently in normal sinus rhythm  -Telemetry monitoring in place  -Holding warfarin  -Need to discuss prior to discharge consideration of stopping anticoagulants indefinitely given recurrent GI bleed     #COPD  #Sleep apnea  #Pulmonary hypertension  -Continue PTA Spiriva and Advair  -CPAP use at night     #Hypertension  -Is managed with diuretics which we are holding for now     #GERD  -Continue Omeprazole     #HLP  -Continue atorvastatin     Daughter is at bedside who helps with interpretation     DVT Prophylaxis: Holding warfarin, legs are tender to touch so we will not do PCD's at this time  Code Status: Full Code  Discharge Dispo: Anticipate will stay 2-3 more days           Interval History (Subjective):      Has more energy today. No shortness of breath, cough or chest discomfort. Had darkish stool with some dark blood last night but no bright red blood.                   Physical Exam:      Last Vital Signs:  /49 (BP Location: Left arm)   Pulse 60   Temp 97  F (36.1  C) (Oral)   Resp 16   Ht 1.422 m (4' 8\")   Wt 69.8 kg (153 lb 14.4 oz)   SpO2 98%   BMI 34.50 kg/m      No intake or output data in the 24 hours ending 06/12/22 " 1211    Constitutional: Awake, alert, cooperative, no apparent distress   Respiratory: Coarse breath sounds   Cardiovascular: Regular rate and rhythm, normal S1 and S2, and no murmur noted   Abdomen: Normal bowel sounds, soft, non-distended, non-tender   Skin: No rashes, no cyanosis, dry to touch   Neuro: Alert and oriented x3, no weakness, numbness, memory loss   Extremities: Purplish discoloration of bilateral legs, erythema of right leg improved   Other(s):        All other systems: Negative          Medications:      All current medications were reviewed with changes reflected in problem list.         Data:      All new lab and imaging data was reviewed.   Labs:  Recent Labs   Lab 06/12/22  0548      POTASSIUM 4.0   CHLORIDE 111*   CO2 20   ANIONGAP 7   GLC 80   BUN 14   CR 0.59*   GFRESTIMATED >90   CAMPOS 7.3*     Recent Labs   Lab 06/12/22  0548   WBC 7.5   HGB 8.8*   HCT 27.5*   *         Imaging:   No results found for this or any previous visit (from the past 24 hour(s)).

## 2022-06-12 NOTE — PLAN OF CARE
Pt A/O x4 and makes needs known. Speaks soley Pashto; daughter is in the room to translate. SBA with ambulating. Here for rectal bleeding. Per pt report denies any stool since the morning of 6/11. Cpap on HS. On telemetry: per tele tech v-pace 60. Abdominal distention noted that is firm to the touch and round in shape. Per pt denies any pain associated. Diuretics held per orders as pt is on IVF. Slight erythema noted in RLE: on Keflex Q6H. Ice to the area to reduce swelling and pain, as well as requested for tylenol and per pt was effective. Right PIV infusing at 75 mL/hr. POC: continue IVF, CBC/BMP draw, Keflex, and reverse INR. Continue to provide supportive cares.

## 2022-06-13 ENCOUNTER — APPOINTMENT (OUTPATIENT)
Dept: GENERAL RADIOLOGY | Facility: CLINIC | Age: 87
End: 2022-06-13
Attending: PHYSICIAN ASSISTANT
Payer: MEDICAID

## 2022-06-13 VITALS
WEIGHT: 153.9 LBS | BODY MASS INDEX: 30.22 KG/M2 | HEART RATE: 60 BPM | OXYGEN SATURATION: 97 % | SYSTOLIC BLOOD PRESSURE: 116 MMHG | TEMPERATURE: 98.2 F | DIASTOLIC BLOOD PRESSURE: 55 MMHG | RESPIRATION RATE: 20 BRPM | HEIGHT: 60 IN

## 2022-06-13 LAB
ANION GAP SERPL CALCULATED.3IONS-SCNC: 3 MMOL/L (ref 3–14)
BUN SERPL-MCNC: 7 MG/DL (ref 7–30)
CALCIUM SERPL-MCNC: 8.3 MG/DL (ref 8.5–10.1)
CHLORIDE BLD-SCNC: 110 MMOL/L (ref 94–109)
CO2 SERPL-SCNC: 23 MMOL/L (ref 20–32)
CREAT SERPL-MCNC: 0.64 MG/DL (ref 0.66–1.25)
ERYTHROCYTE [DISTWIDTH] IN BLOOD BY AUTOMATED COUNT: 13.2 % (ref 10–15)
GFR SERPL CREATININE-BSD FRML MDRD: >90 ML/MIN/1.73M2
GLUCOSE BLD-MCNC: 90 MG/DL (ref 70–99)
HCT VFR BLD AUTO: 29 % (ref 40–53)
HGB BLD-MCNC: 9.1 G/DL (ref 13.3–17.7)
INR PPP: 1.28 (ref 0.85–1.15)
MCH RBC QN AUTO: 32 PG (ref 26.5–33)
MCHC RBC AUTO-ENTMCNC: 31.4 G/DL (ref 31.5–36.5)
MCV RBC AUTO: 102 FL (ref 78–100)
PLATELET # BLD AUTO: 228 10E3/UL (ref 150–450)
POTASSIUM BLD-SCNC: 4.5 MMOL/L (ref 3.4–5.3)
RBC # BLD AUTO: 2.84 10E6/UL (ref 4.4–5.9)
SODIUM SERPL-SCNC: 136 MMOL/L (ref 133–144)
WBC # BLD AUTO: 7.1 10E3/UL (ref 4–11)

## 2022-06-13 PROCEDURE — 250N000013 HC RX MED GY IP 250 OP 250 PS 637: Performed by: INTERNAL MEDICINE

## 2022-06-13 PROCEDURE — 36415 COLL VENOUS BLD VENIPUNCTURE: CPT | Performed by: PHYSICIAN ASSISTANT

## 2022-06-13 PROCEDURE — 80048 BASIC METABOLIC PNL TOTAL CA: CPT | Performed by: PHYSICIAN ASSISTANT

## 2022-06-13 PROCEDURE — 250N000013 HC RX MED GY IP 250 OP 250 PS 637: Performed by: PHYSICIAN ASSISTANT

## 2022-06-13 PROCEDURE — 85027 COMPLETE CBC AUTOMATED: CPT | Performed by: PHYSICIAN ASSISTANT

## 2022-06-13 PROCEDURE — 99238 HOSP IP/OBS DSCHRG MGMT 30/<: CPT | Performed by: PHYSICIAN ASSISTANT

## 2022-06-13 PROCEDURE — 250N000013 HC RX MED GY IP 250 OP 250 PS 637: Performed by: STUDENT IN AN ORGANIZED HEALTH CARE EDUCATION/TRAINING PROGRAM

## 2022-06-13 PROCEDURE — 250N000012 HC RX MED GY IP 250 OP 636 PS 637: Performed by: STUDENT IN AN ORGANIZED HEALTH CARE EDUCATION/TRAINING PROGRAM

## 2022-06-13 PROCEDURE — 85610 PROTHROMBIN TIME: CPT | Performed by: PHYSICIAN ASSISTANT

## 2022-06-13 PROCEDURE — 73590 X-RAY EXAM OF LOWER LEG: CPT | Mod: RT

## 2022-06-13 RX ORDER — ACETAMINOPHEN 650 MG/1
650 SUPPOSITORY RECTAL EVERY 4 HOURS PRN
Status: DISCONTINUED | OUTPATIENT
Start: 2022-06-13 | End: 2022-06-13

## 2022-06-13 RX ORDER — CEPHALEXIN 500 MG/1
500 CAPSULE ORAL EVERY 6 HOURS
Qty: 20 CAPSULE | Refills: 0 | Status: SHIPPED | OUTPATIENT
Start: 2022-06-13 | End: 2022-06-18

## 2022-06-13 RX ORDER — ACETAMINOPHEN 325 MG/1
975 TABLET ORAL 3 TIMES DAILY
Status: DISCONTINUED | OUTPATIENT
Start: 2022-06-13 | End: 2022-06-13 | Stop reason: HOSPADM

## 2022-06-13 RX ORDER — ACETAMINOPHEN 325 MG/1
325 TABLET ORAL EVERY 4 HOURS PRN
Status: DISCONTINUED | OUTPATIENT
Start: 2022-06-13 | End: 2022-06-13

## 2022-06-13 RX ORDER — ACETAMINOPHEN 325 MG/1
650 TABLET ORAL EVERY 4 HOURS PRN
Status: DISCONTINUED | OUTPATIENT
Start: 2022-06-13 | End: 2022-06-13

## 2022-06-13 RX ORDER — FERROUS GLUCONATE 324(38)MG
324 TABLET ORAL
Qty: 90 TABLET | Refills: 1 | Status: SHIPPED | OUTPATIENT
Start: 2022-06-13

## 2022-06-13 RX ADMIN — PREDNISONE 5 MG: 5 TABLET ORAL at 08:06

## 2022-06-13 RX ADMIN — CEPHALEXIN 500 MG: 500 CAPSULE ORAL at 11:36

## 2022-06-13 RX ADMIN — ACETAMINOPHEN 975 MG: 325 TABLET ORAL at 14:20

## 2022-06-13 RX ADMIN — CEPHALEXIN 500 MG: 500 CAPSULE ORAL at 05:36

## 2022-06-13 RX ADMIN — PANTOPRAZOLE SODIUM 40 MG: 20 TABLET, DELAYED RELEASE ORAL at 08:06

## 2022-06-13 RX ADMIN — FLUTICASONE FUROATE AND VILANTEROL TRIFENATATE 1 PUFF: 200; 25 POWDER RESPIRATORY (INHALATION) at 08:08

## 2022-06-13 RX ADMIN — ACETAMINOPHEN 650 MG: 325 TABLET ORAL at 08:06

## 2022-06-13 RX ADMIN — Medication: at 12:17

## 2022-06-13 RX ADMIN — CEPHALEXIN 500 MG: 500 CAPSULE ORAL at 01:28

## 2022-06-13 RX ADMIN — UMECLIDINIUM 1 PUFF: 62.5 AEROSOL, POWDER ORAL at 08:08

## 2022-06-13 RX ADMIN — ATORVASTATIN CALCIUM 10 MG: 10 TABLET, FILM COATED ORAL at 08:06

## 2022-06-13 ASSESSMENT — ACTIVITIES OF DAILY LIVING (ADL)
ADLS_ACUITY_SCORE: 24

## 2022-06-13 NOTE — PROGRESS NOTES
"GASTROENTEROLOGY PROGRESS NOTE        SUBJECTIVE:  Interview conducted with the aid of the patient's daughter who translates from Czech.  Patient has no abdominal pain.  Last evening around midnight he passed red blood clots.  No nausea or vomiting.  Tolerating clears.  Daughter has been staying overnight to care for her father.     OBJECTIVE:    /63 (BP Location: Left arm)   Pulse 60   Temp 97.9  F (36.6  C) (Oral)   Resp 16   Ht 1.422 m (4' 8\")   Wt 69.8 kg (153 lb 14.4 oz)   SpO2 98%   BMI 34.50 kg/m    Temp (24hrs), Av.8  F (36.6  C), Min:97  F (36.1  C), Max:98.2  F (36.8  C)    Patient Vitals for the past 72 hrs:   Weight   06/10/22 1430 69.8 kg (153 lb 14.4 oz)        PHYSICAL EXAM     Constitutional: No distress, resting comfortably    Abdomen: Soft, nontender      Additional Comments:  ROS, FH, SH: See initial GI consult for details.     I have reviewed the patient's new clinical lab results:     Recent Labs   Lab Test 22  0836 22  0646 22  1105 22  0548 22  21022  1005 22  0525   WBC 7.1  --   --  7.5  --   --  8.7   HGB 9.1*  --   --  8.8* 9.2*  --  9.2*   *  --   --  102*  --   --  102*     --   --  214  --   --  200   INR  --  1.28* 1.55*  --  2.23*   < >  --     < > = values in this interval not displayed.     Recent Labs   Lab Test 22  0836 22  0548 22  0525 22   POTASSIUM 4.5 4.0 4.4 4.5   CHLORIDE 110* 111* 106 99   CO2  --  20 24 29   BUN  --  14 17 31*   ANIONGAP  --  7 6 8     Recent Labs   Lab Test 22  1933 08/10/21  1733 21  0312   ALBUMIN 3.4  --  3.2* 3.4  --    BILITOTAL 0.6  --  0.5 1.1  --    ALT 23  --  23 18  --    AST 12  --  19 14  --    PROTEIN  --  Negative  --   --  Negative       ASSESSMENT/ PLAN  Thierry Samuel is an 87-year-old Czech-speaking male with history of diverticular bleeding who presented to the hospital with " rectal bleeding found to have an elevated INR is on Coumadin and hemoglobin low at 8.8.    1.  Diverticular bleeding: Patient has been passing red blood clots last evening.  Noted.  That his hematochezia is improving.  Hemoglobin is stable at 9.1 today.  Yesterday was 8.8.  His INR is improving as well at 1.28.     -- Patient is slowly improving.  Diverticular bleed should spontaneously heal as long as INR continues to be corrected.  -- No role for endoscopic intervention at this time.  If significant bleeding is noted, would recommend CT angiogram/IR consult.  -- Clear liquid diet, can advance as tolerated.  -- Medicine team to determine necessity Coumadin as it was initially being given for history of atrial fibrillation.  -- Follow serial hemoglobin, transfusions as needed per medicine team.    Discussed with Dr. Paloma Gonzales PA-C  [facility] ( [facility])

## 2022-06-13 NOTE — PHARMACY-ANTICOAGULATION SERVICE
Clinical Pharmacy- Warfarin Discharge Note  This patient was on warfarin for the treatment of Atrial fibrillation.  INR Goal= 2-3      Anticoagulation Dose History     Recent Dosing and Labs Latest Ref Rng & Units 9/6/2021 6/10/2022 6/10/2022 6/11/2022 6/11/2022 6/12/2022 6/13/2022    INR 0.85 - 1.15 3.45(H) 3.38(H) 3.43(H) 3.02(H) 2.23(H) 1.55(H) 1.28(H)        Warfarin was stopped from now on due to GI bleeding. Agree with discharge planning.

## 2022-06-13 NOTE — DISCHARGE SUMMARY
Discharge Summary  Hospitalist Service    Thierry Samuel MRN# [id number]   YOB: 1935 Age: 87 year old     Date of Admission: 6/9/2022  Date of Discharge: 6/13/2022  Admitting Physician: Deion Dias MD  Discharge Physician: Wendy Ford PA-C  Discharging Service: Hospitalist Service     Primary Provider: Trinity Brown  Primary Care Physician Phone Number: [phone]         Discharge Diagnoses/Problem Oriented Hospital Course (Providers):      Thierry Samuel was admitted on 6/9/2022 by Deion Dias MD and I would refer you to their history and physical. Briefly, he is a medically complex 87 year old male with history of heart failure with preserved ejection fraction, history of A. fib on Coumadin with ppm placement, obstructive sleep apnea with CPAP use, pulmonary hypertension, hypertension COPD and GERD use admitted with multiple days of painless bright red blood per rectum.      On admission he was afebrile with pressure 108/53, pulse 61 with ventricular paced rhythm and breathing comfortably on room air without hypoxia. INR on admission was 3.43 and initial hemoglobin was 10.8. GI was called who initially recommended to just watch. Gentle fluids were started and diuretics were held. He unfortunately, continued to have bright red blood in his stool and hemoglobin dropped to 9.2 with complaints of dizziness on ambulation. GI now recommended to reverse INR (was 2.23 at time do reversal) and oral vitamin k 5 mg given. Lowest hemoglobin was 8.8 but after reversal hemoglobin plateaud with hemoglobin 9.1 at discharge. INR at discharge was 1.28 and Warfarin WAS NOT restarted (on for a fib but is now v paced). Long discussion with daughter about risks vs benefits and we mutually agreed it should be stopped. He was started on an iron supplement and his daughter was told to restart his diuretics over the next 2-3 days.     Incidentally, he also developed a right lower  leg cellulitis that improved with Keflex but X ray showed evidence of sclerosis in the medial aspect of the proximal tibial metaphysis which he may follow up with ortho if he wants.    The patient is [place] Islander speaking and his daughter acted as interpretor for entire admission.                           Code Status:      Full Code        Brief Hospital Stay Summary Sent Home With Patient in AVS:          Reason for your hospital stay      You were admitted for concerns of GI bleeding that we suspect was   diverticular and due to your Warfarin use. We did reverse your INR to slow   the bleeding and you did not need a blood transfusion. You will STOP   taking Warfarin forever. During this time you received IV fluids and we   did not give you your diuretics. It is important that you stay hydrated   and restart your Spironolactone and Torsemide over the next couple of   days.      We started you on on iron to help boost your hemoglobin production. This   might cause your stool to be dark (black) or cause constipation so you   could take a stool softener.     You might have a skin infection on your right lower leg. We started you on   Keflex.                          Pending Results:        Unresulted Labs Ordered in the Past 30 Days of this Admission     No orders found from 5/10/2022 to 6/10/2022.            Discharge Instructions and Follow-Up:      Follow-up Appointments     Follow-up and recommended labs and tests       Follow up with primary care provider, Trinity Brown, within 7 days to   evaluate medication change and for hospital follow- up.  The following   labs/tests are recommended: Recheck Hgb, look at right lower leg.               Discharge Disposition:        Discharged to home         Discharge Medications:        Current Discharge Medication List      START taking these medications    Details   cephALEXin (KEFLEX) 500 MG capsule Take 1 capsule (500 mg) by mouth every 6 hours for 5 days  Qty: 20 capsule,  Refills: 0    Associated Diagnoses: Cellulitis of right lower extremity      ferrous gluconate (FERGON) 324 (38 Fe) MG tablet Take 1 tablet (324 mg) by mouth daily (with breakfast)  Qty: 90 tablet, Refills: 1    Associated Diagnoses: Anemia due to blood loss, acute         CONTINUE these medications which have NOT CHANGED    Details   albuterol (PROAIR HFA/PROVENTIL HFA/VENTOLIN HFA) 108 (90 Base) MCG/ACT inhaler Inhale 2 puffs into the lungs every 6 hours as needed for shortness of breath / dyspnea or wheezing      atorvastatin (LIPITOR) 10 MG tablet Take 10 mg by mouth daily       fluticasone-salmeterol (ADVAIR) 500-50 MCG/ACT inhaler Inhale 1 puff into the lungs every 12 hours      hypromellose-dextran (ARTIFICAL TEARS) 0.1-0.3 % SOLN ophthalmic solution Apply 2-3 drops to eye every hour as needed for dry eyes  Qty: 30 mL, Refills: 0    Associated Diagnoses: Dry eyes      ipratropium - albuterol 0.5 mg/2.5 mg/3 mL (DUONEB) 0.5-2.5 (3) MG/3ML neb solution Take 1 vial by nebulization every 6 hours as needed for shortness of breath / dyspnea or wheezing      montelukast (SINGULAIR) 10 MG tablet Take 10 mg by mouth every evening      omeprazole (PRILOSEC) 20 MG DR capsule Take 20 mg by mouth 2 times daily      POTASSIUM CHLORIDE PO Take by mouth daily as needed Dosage unknown - takes PRN when he's having leg cramps      prednisoLONE 5 MG tablet Take 1 tablet (5 mg) by mouth every other day, alternate days take 0.5 tablets (2.5 mg) by mouth.      spironolactone (ALDACTONE) 25 MG tablet Take 1 tablet (25 mg) by mouth daily  Qty: 90 tablet, Refills: 3    Associated Diagnoses: Bilateral lower extremity edema      tiotropium (SPIRIVA) 18 MCG inhalation capsule Inhale 18 mcg into the lungs daily      torsemide (DEMADEX) 20 MG tablet Take 2 tablets (40 mg) by mouth daily  Qty: 180 tablet, Refills: 1    Associated Diagnoses: Bilateral lower extremity edema; Chronic right-sided heart failure (H)         STOP taking these  "medications       warfarin ANTICOAGULANT (COUMADIN) 2.5 MG tablet Comments:   Reason for Stopping:                 Allergies:       No Known Allergies        Consultations This Hospital Stay:        Consultation during this admission received from gastroenterology         Condition and Physical on Discharge:        Discharge condition: Stable   Vitals: Blood pressure 126/64, pulse 60, temperature 97.9  F (36.6  C), temperature source Oral, resp. rate 16, height 1.422 m (4' 8\"), weight 69.8 kg (153 lb 14.4 oz), SpO2 99 %.     Constitutional: Alert and orientated x 3     Lungs: CTAB    Cardiovascular: RRR with no murmur   Abdomen: Bowel sounds are present with no tenderness   Skin: Purplish discoloration of lower legs, erythema improved   Other:          Discharge Time:      Less than 30 minutes.        Image Results From This Hospital Stay (For Non-EPIC Providers):        Results for orders placed or performed during the hospital encounter of 06/09/22   CT Abdomen Pelvis w Contrast    Narrative    EXAM: CT ABDOMEN PELVIS W CONTRAST  LOCATION: [facility]  DATE/TIME: 6/10/2022 12:52 AM    INDICATION: Abdominal pain, acute, nonlocalized  COMPARISON: None.  TECHNIQUE: CT scan of the abdomen and pelvis was performed following injection of IV contrast. Multiplanar reformats were obtained. Dose reduction techniques were used.  CONTRAST: 82 mL Isovue 370    FINDINGS:   LOWER CHEST: Cardiac enlargement with partially visualized bilateral atrial enlargement. Coronary artery calcification. Minimal pulmonary emphysema. No infiltrates or effusions.    HEPATOBILIARY: Cholelithiasis. No biliary dilatation. Liver unremarkable.    PANCREAS: Normal.    SPLEEN: Normal.    ADRENAL GLANDS: Normal.    KIDNEYS/BLADDER: Symmetric renal enhancement. Left lower pole renal cyst, no further follow-up. No urinary collecting system dilatation or calculi. Bladder unremarkable. Marked prostate enlargement.    BOWEL: Colonic " diverticulosis. No dilatation or inflammatory change.    LYMPH NODES: No lymphadenopathy.    VASCULATURE: Normal caliber abdominal aorta. Moderate vascular calcification.    PELVIC ORGANS: No free fluid.    MUSCULOSKELETAL: Fat-containing bilateral inguinal hernias, greater on the right. Minimal degenerative changes both hips and throughout the spine.      Impression    IMPRESSION:   1.  Cholelithiasis. No biliary dilatation.    2.  Colonic diverticulosis without evidence for active inflammation.    3.  No bowel dilatation or obstruction.    4.  Fat-containing bilateral inguinal hernias, greater on the right.   XR Tibia & Fibula Right 2 Views    Narrative    XR TIBIA & FIBULA RT 2 VW 6/13/2022 11:19 AM    HISTORY: Right leg pain when bearing weight after fall    COMPARISON: None.    FINDINGS: No fracture. Arterial calcifications. Benign island of  sclerosis in the medial aspect of the proximal tibial metaphysis.    MICHAEL FARLEY MD         SYSTEM ID:  [id number]           Most Recent Lab Results In EPIC (For Non-EPIC Providers):    Most Recent 3 CBC's:  Recent Labs   Lab Test 06/13/22  0836 06/12/22  0548 06/11/22  2101 06/11/22  0525   WBC 7.1 7.5  --  8.7   HGB 9.1* 8.8* 9.2* 9.2*   * 102*  --  102*    214  --  200      Most Recent 3 BMP's:  Recent Labs   Lab Test 06/13/22  0836 06/12/22  0548 06/11/22  0525    138 136   POTASSIUM 4.5 4.0 4.4   CHLORIDE 110* 111* 106   CO2 23 20 24   BUN 7 14 17   CR 0.64* 0.59* 0.65*   ANIONGAP 3 7 6   CAMPOS 8.3* 7.3* 7.5*   GLC 90 80 79     Most Recent 3 Troponin's:No lab results found.  Most Recent 3 INR's:  Recent Labs   Lab Test 06/13/22  0646 06/12/22  1105 06/11/22 2101   INR 1.28* 1.55* 2.23*     Most Recent 2 LFT's:  Recent Labs   Lab Test 06/09/22  2205 09/04/21  1933   AST 12 19   ALT 23 23   ALKPHOS 73 104   BILITOTAL 0.6 0.5     Most Recent Cholesterol Panel:No lab results found.  Most Recent 6 Bacteria Isolates From Any Culture (See EPIC  Reports for Culture Details):  Recent Labs   Lab Test 09/29/18  1325   CULT Heavy growth  Normal [place]       Most Recent TSH, T4 and HgbA1c:   Recent Labs   Lab Test 09/03/21  1251 08/11/21  1056   TSH 0.83  --    A1C  --  5.6

## 2022-06-13 NOTE — PLAN OF CARE
Pt A/O x4 and calls appropriately. Daughter in the room to translate from Welsh to English. SBA with transfers. Currently on a clear liquid diet. PIV now SL per orders. Currently on Keflex for right leg cellulitis. Abdomen doesn't seem as distended as last night and is less firm to the touch. Per gastroenterology: bleeding likely r/t diverticulosis. Coumadin held to decrease INR. Pt had a moderate amount of bleeding from the rectum at 0000 that was dark red in color and highly viscous. Pt denies abdominal pain. Notified provider but did not receive any new orders.     POC: Resume diet, recheck INR, and CBC. Continue to provide supportive cares.

## 2022-06-13 NOTE — UTILIZATION REVIEW
"  Admission Status; Secondary Review Determination         Under the authority of the Utilization Management Committee, the utilization review process indicated a secondary review on the above patient.  The review outcome is based on review of the medical records, discussions with staff, and applying clinical experience noted on the date of the review.        (xxx)      Inpatient Status Appropriate - This patient's medical care is consistent with medical management for inpatient care and reasonable inpatient medical practice.      () Observation Status Appropriate - This patient does not meet hospital inpatient criteria and is placed in observation status. If this patient's primary payer is Medicare and was admitted as an inpatient, Condition Code 44 should be used and patient status changed to \"observation\".   () Admission Status NOT Appropriate - This patient's medical care is not consistent with medical management for Inpatient or Observation Status.          RATIONALE FOR DETERMINATION     Thierry Samuel is a medically complex 87 year old male with history of heart failure with preserved ejection fraction, A. fib (on Coumadin) with ppm placement, obstructive sleep apnea with CPAP use, pulmonary hypertension, hypertension, COPD, and GERD who was admitted on 6/9/2022 with painless bright red blood per rectum.  His initial hemoglobin was 10.8.  He has had ongoing bleeding per rectum with hemoglobin drop to 8.8.  GI consulted and suspect bleeding from diverticula but given ongoing bleeding, recommended to reverse INR.  Vitamin K was given.  He has required close clinical monitoring, serial hemoglobins, IVF, and prolonged stay due to ongoing bleeding in the setting of anticoagulation.  He is at increased risk for clinical decompensation and adverse events.  IP status is appropriate.      The severity of illness, intensity of service provided, expected LOS and risk for adverse outcome make the care complex, " high risk and appropriate for hospital admission.        The information on this document is developed by the utilization review team in order for the business office to ensure compliance.  This only denotes the appropriateness of proper admission status and does not reflect the quality of care rendered.         The definitions of Inpatient Status and Observation Status used in making the determination above are those provided in the CMS Coverage Manual, Chapter 1 and Chapter 6, section 70.4.      Sincerely,     Asia Delgado MD  Physician Advisor   Utilization Review/ Case Management  [facility].

## 2022-06-13 NOTE — PLAN OF CARE
Shift [id number]  Primary problem: Painless bloody stools     Patient up independently in room with his daughter. Slovak speaking, daughter translates. Clear liquid diet. Right IV site saline locked. Patient requests ICY HOT and a heating pad for his shoulder. PRN TYLENOL given for shoulder and RLE pain. Patient is expected to discharge home today with his daughter.     Zaina Sánchez RN    Vital signs:  Temp: 98.2  F (36.8  C) Temp src: Oral BP: 116/55 Pulse: 60   Resp: 20 SpO2: 97 % O2 Device: None (Room air)

## 2022-06-13 NOTE — PLAN OF CARE
Patient's After Visit Summary was reviewed with patient and daughter.   Patient verbalized understanding of After Visit Summary, recommended follow up and was given an opportunity to ask questions.   Discharge medications sent home with patient/family:  No   Discharged with daughter.

## 2022-06-14 ENCOUNTER — PATIENT OUTREACH (OUTPATIENT)
Dept: CARE COORDINATION | Facility: CLINIC | Age: 87
End: 2022-06-14
Payer: MEDICAID

## 2022-06-14 DIAGNOSIS — Z71.89 OTHER SPECIFIED COUNSELING: ICD-10-CM

## 2022-06-14 NOTE — PROGRESS NOTES
Clinic Care Coordination Contact  [facility]/Voicemail       Clinical Data: Care Coordinator Outreach  Outreach attempted x 1.  Left message on patient's voicemail with call back information and requested return call.  Plan:  Care Coordinator will try to reach patient again in 1-2 business days.    Anika Sorensen  Care Transitions Assistant  [facility]

## 2022-06-15 NOTE — PROGRESS NOTES
Clinic Care Coordination Contact  [facility]/Voicemail       Clinical Data: Care Coordinator Outreach  Outreach attempted x 2.  Left message on patient's voicemail with call back information and requested return call.  Plan: Care Coordinator will do no further outreaches at this time.  Anika Sorensen  Care Transitions Assistant  [facility]

## 2022-08-16 NOTE — PHARMACY-ADMISSION MEDICATION HISTORY
Admission medication history interview status for this patient is complete. See [facility] admission navigator for allergy information, prior to admission medications and immunization status.     Medication history interview done, indicate source(s): Family - daughter  Medication history resources (including written lists, pill bottles, clinic record): Med bottles  Pharmacy: [facility]    Changes made to PTA medication list:  Added: Advair  Changed: albuterol inhaler 6 puffs q8h --> 2 puffs q6h prn, warfarin 5 mg MW, 2.5 mg ROW --> 2.5 mg MWFSat, 5 mg ROW  Reported as Not Taking: None  Removed: Serevent inhaler    Actions taken by pharmacist (provider contacted, etc):None     Additional medication history information: Pt's daughter states medications are dispensed at clinic (no SureScripts history, list updated per med bottles).    Medication reconciliation/reorder completed by provider prior to medication history?  N   (Y/N)       Prior to Admission medications    Medication Sig Last Dose Taking? Auth Provider   albuterol (PROAIR HFA/PROVENTIL HFA/VENTOLIN HFA) 108 (90 Base) MCG/ACT inhaler Inhale 2 puffs into the lungs every 6 hours as needed for shortness of breath / dyspnea or wheezing prn at prn Yes Unknown, Entered By History   atorvastatin (LIPITOR) 10 MG tablet Take 10 mg by mouth daily  6/9/2022 at am Yes Reported, Patient   fluticasone-salmeterol (ADVAIR) 500-50 MCG/ACT inhaler Inhale 1 puff into the lungs every 12 hours 6/9/2022 at am Yes Unknown, Entered By History   hypromellose-dextran (ARTIFICAL TEARS) 0.1-0.3 % SOLN ophthalmic solution Apply 2-3 drops to eye every hour as needed for dry eyes prn at prn Yes Dane Leon MD   ipratropium - albuterol 0.5 mg/2.5 mg/3 mL (DUONEB) 0.5-2.5 (3) MG/3ML neb solution Take 1 vial by nebulization every 6 hours as needed for shortness of breath / dyspnea or wheezing prn at prn Yes Unknown, Entered By History   montelukast (SINGULAIR) 10 MG tablet Take 10 mg by  [Patient] : patient mouth every evening 6/8/2022 at pm Yes Unknown, Entered By History   omeprazole (PRILOSEC) 20 MG DR capsule Take 20 mg by mouth 2 times daily 6/9/2022 at am Yes Unknown, Entered By History   POTASSIUM CHLORIDE PO Take by mouth daily as needed Dosage unknown - takes PRN when he's having leg cramps prn at prn Yes Reported, Patient   prednisoLONE 5 MG tablet Take 1 tablet (5 mg) by mouth every other day, alternate days take 0.5 tablets (2.5 mg) by mouth. 6/9/2022 at am Yes Reported, Patient   spironolactone (ALDACTONE) 25 MG tablet Take 1 tablet (25 mg) by mouth daily 6/9/2022 at am Yes Yo Rod MD   tiotropium (SPIRIVA) 18 MCG inhalation capsule Inhale 18 mcg into the lungs daily 6/9/2022 at am Yes Reported, Patient   torsemide (DEMADEX) 20 MG tablet Take 2 tablets (40 mg) by mouth daily 6/9/2022 at am Yes Symone Solorio APRN CNP   warfarin ANTICOAGULANT (COUMADIN) 2.5 MG tablet Skip your coumadin dose the evening of discharge, then take 2.5 mg on Tuesday and Wednesday, and have your INR checked Thursday as scheduled.  Patient taking differently: Has supply of 5 mg tabs: take 1/2 tablet (2.5 mg) by mouth every Monday, Wednesday, Friday, Saturday and take 1 tablet (5 mg) by mouth rest of the week 6/9/2022 at am Yes Phan Benjamin MD

## 2022-10-05 NOTE — PROGRESS NOTES
Reviewed phone encounter. Appears swelling has improved. Plan to continue current diuretic regimen and follow up as scheduled on 1/14/21.     VERONIQUE [facility]  Pager: [phone]     General Sunscreen Counseling: I recommended a broad spectrum sunscreen with a SPF of 30 or higher.  I explained that SPF 30 sunscreens block approximately 97 percent of the sun's harmful rays.  Sunscreens should be applied at least 15 minutes prior to expected sun exposure and then every 2 hours after that as long as sun exposure continues. If swimming or exercising sunscreen should be reapplied every 45 minutes to an hour after getting wet or sweating.  One ounce, or the equivalent of a shot glass full of sunscreen, is adequate to protect the skin not covered by a bathing suit. I also recommended a lip balm with a sunscreen as well. Sun protective clothing can be used in lieu of sunscreen but must be worn the entire time you are exposed to the sun's rays. Detail Level: Zone

## 2022-10-15 ENCOUNTER — HOSPITAL ENCOUNTER (EMERGENCY)
Facility: CLINIC | Age: 87
Discharge: HOME | End: 2022-10-15
Attending: EMERGENCY MEDICINE | Admitting: EMERGENCY MEDICINE
Payer: MEDICAID

## 2022-10-15 ENCOUNTER — APPOINTMENT (OUTPATIENT)
Dept: CT IMAGING | Facility: CLINIC | Age: 87
End: 2022-10-15
Attending: EMERGENCY MEDICINE
Payer: MEDICAID

## 2022-10-15 VITALS
WEIGHT: 154 LBS | OXYGEN SATURATION: 95 % | TEMPERATURE: 98.4 F | HEIGHT: 61 IN | RESPIRATION RATE: 25 BRPM | SYSTOLIC BLOOD PRESSURE: 113 MMHG | HEART RATE: 62 BPM | BODY MASS INDEX: 29.07 KG/M2 | DIASTOLIC BLOOD PRESSURE: 54 MMHG

## 2022-10-15 DIAGNOSIS — R06.2 WHEEZING: ICD-10-CM

## 2022-10-15 DIAGNOSIS — S81.801A OPEN WOUND OF RIGHT LOWER EXTREMITY, INITIAL ENCOUNTER: ICD-10-CM

## 2022-10-15 LAB
ALBUMIN SERPL BCG-MCNC: 4.2 G/DL (ref 3.5–5.2)
ALP SERPL-CCNC: 100 U/L (ref 40–129)
ALT SERPL W P-5'-P-CCNC: 17 U/L (ref 10–50)
ANION GAP SERPL CALCULATED.3IONS-SCNC: 10 MMOL/L (ref 7–15)
APTT PPP: 28 SECONDS (ref 22–38)
AST SERPL W P-5'-P-CCNC: 19 U/L (ref 10–50)
BASOPHILS # BLD AUTO: 0 10E3/UL (ref 0–0.2)
BASOPHILS NFR BLD AUTO: 0 %
BILIRUB SERPL-MCNC: 0.4 MG/DL
BUN SERPL-MCNC: 21.3 MG/DL (ref 8–23)
CALCIUM SERPL-MCNC: 10 MG/DL (ref 8.8–10.2)
CHLORIDE SERPL-SCNC: 99 MMOL/L (ref 98–107)
CREAT SERPL-MCNC: 0.81 MG/DL (ref 0.67–1.17)
DEPRECATED HCO3 PLAS-SCNC: 28 MMOL/L (ref 22–29)
EOSINOPHIL # BLD AUTO: 0 10E3/UL (ref 0–0.7)
EOSINOPHIL NFR BLD AUTO: 0 %
ERYTHROCYTE [DISTWIDTH] IN BLOOD BY AUTOMATED COUNT: 14.6 % (ref 10–15)
GFR SERPL CREATININE-BSD FRML MDRD: 86 ML/MIN/1.73M2
GLUCOSE SERPL-MCNC: 115 MG/DL (ref 70–99)
HCT VFR BLD AUTO: 47.1 % (ref 40–53)
HGB BLD-MCNC: 14.7 G/DL (ref 13.3–17.7)
HOLD SPECIMEN: NORMAL
IMM GRANULOCYTES # BLD: 0.1 10E3/UL
IMM GRANULOCYTES NFR BLD: 1 %
INR PPP: 1.04 (ref 0.85–1.15)
LYMPHOCYTES # BLD AUTO: 0.8 10E3/UL (ref 0.8–5.3)
LYMPHOCYTES NFR BLD AUTO: 9 %
MCH RBC QN AUTO: 29.4 PG (ref 26.5–33)
MCHC RBC AUTO-ENTMCNC: 31.2 G/DL (ref 31.5–36.5)
MCV RBC AUTO: 94 FL (ref 78–100)
MONOCYTES # BLD AUTO: 0.9 10E3/UL (ref 0–1.3)
MONOCYTES NFR BLD AUTO: 10 %
NEUTROPHILS # BLD AUTO: 7.4 10E3/UL (ref 1.6–8.3)
NEUTROPHILS NFR BLD AUTO: 80 %
NRBC # BLD AUTO: 0 10E3/UL
NRBC BLD AUTO-RTO: 0 /100
PLATELET # BLD AUTO: 345 10E3/UL (ref 150–450)
POTASSIUM SERPL-SCNC: 5.1 MMOL/L (ref 3.4–5.3)
PROT SERPL-MCNC: 7.4 G/DL (ref 6.4–8.3)
RBC # BLD AUTO: 5 10E6/UL (ref 4.4–5.9)
SARS-COV-2 RNA RESP QL NAA+PROBE: NEGATIVE
SODIUM SERPL-SCNC: 137 MMOL/L (ref 136–145)
WBC # BLD AUTO: 9.2 10E3/UL (ref 4–11)

## 2022-10-15 PROCEDURE — 96365 THER/PROPH/DIAG IV INF INIT: CPT | Mod: 59

## 2022-10-15 PROCEDURE — 250N000009 HC RX 250: Performed by: EMERGENCY MEDICINE

## 2022-10-15 PROCEDURE — 87106 FUNGI IDENTIFICATION YEAST: CPT | Performed by: EMERGENCY MEDICINE

## 2022-10-15 PROCEDURE — 87040 BLOOD CULTURE FOR BACTERIA: CPT | Performed by: EMERGENCY MEDICINE

## 2022-10-15 PROCEDURE — 82803 BLOOD GASES ANY COMBINATION: CPT

## 2022-10-15 PROCEDURE — 83605 ASSAY OF LACTIC ACID: CPT

## 2022-10-15 PROCEDURE — C9803 HOPD COVID-19 SPEC COLLECT: HCPCS

## 2022-10-15 PROCEDURE — 999N000105 HC STATISTIC NO DOCUMENTATION TO SUPPORT CHARGE

## 2022-10-15 PROCEDURE — 80053 COMPREHEN METABOLIC PANEL: CPT | Performed by: EMERGENCY MEDICINE

## 2022-10-15 PROCEDURE — 36415 COLL VENOUS BLD VENIPUNCTURE: CPT | Performed by: EMERGENCY MEDICINE

## 2022-10-15 PROCEDURE — 99291 CRITICAL CARE FIRST HOUR: CPT | Mod: 25

## 2022-10-15 PROCEDURE — 94640 AIRWAY INHALATION TREATMENT: CPT

## 2022-10-15 PROCEDURE — 85025 COMPLETE CBC W/AUTO DIFF WBC: CPT | Performed by: EMERGENCY MEDICINE

## 2022-10-15 PROCEDURE — 73701 CT LOWER EXTREMITY W/DYE: CPT | Mod: RT

## 2022-10-15 PROCEDURE — 250N000011 HC RX IP 250 OP 636: Performed by: EMERGENCY MEDICINE

## 2022-10-15 PROCEDURE — U0005 INFEC AGEN DETEC AMPLI PROBE: HCPCS | Performed by: EMERGENCY MEDICINE

## 2022-10-15 PROCEDURE — 99285 EMERGENCY DEPT VISIT HI MDM: CPT | Mod: 25

## 2022-10-15 PROCEDURE — 85610 PROTHROMBIN TIME: CPT | Performed by: EMERGENCY MEDICINE

## 2022-10-15 PROCEDURE — 87070 CULTURE OTHR SPECIMN AEROBIC: CPT | Performed by: EMERGENCY MEDICINE

## 2022-10-15 PROCEDURE — 85730 THROMBOPLASTIN TIME PARTIAL: CPT | Performed by: EMERGENCY MEDICINE

## 2022-10-15 RX ORDER — IPRATROPIUM BROMIDE AND ALBUTEROL SULFATE 2.5; .5 MG/3ML; MG/3ML
3 SOLUTION RESPIRATORY (INHALATION) ONCE
Status: COMPLETED | OUTPATIENT
Start: 2022-10-15 | End: 2022-10-15

## 2022-10-15 RX ORDER — CEFTRIAXONE 1 G/1
1 INJECTION, POWDER, FOR SOLUTION INTRAMUSCULAR; INTRAVENOUS ONCE
Status: COMPLETED | OUTPATIENT
Start: 2022-10-15 | End: 2022-10-15

## 2022-10-15 RX ORDER — IOPAMIDOL 755 MG/ML
500 INJECTION, SOLUTION INTRAVASCULAR ONCE
Status: COMPLETED | OUTPATIENT
Start: 2022-10-15 | End: 2022-10-15

## 2022-10-15 RX ORDER — CEPHALEXIN 500 MG/1
500 CAPSULE ORAL 4 TIMES DAILY
Qty: 40 CAPSULE | Refills: 0 | Status: SHIPPED | OUTPATIENT
Start: 2022-10-16 | End: 2022-10-26

## 2022-10-15 RX ADMIN — CEFTRIAXONE 1 G: 1 INJECTION, POWDER, FOR SOLUTION INTRAMUSCULAR; INTRAVENOUS at 19:07

## 2022-10-15 RX ADMIN — IPRATROPIUM BROMIDE AND ALBUTEROL SULFATE 3 ML: 2.5; .5 SOLUTION RESPIRATORY (INHALATION) at 17:35

## 2022-10-15 RX ADMIN — SODIUM CHLORIDE 65 ML: 9 INJECTION, SOLUTION INTRAVENOUS at 18:19

## 2022-10-15 RX ADMIN — IOPAMIDOL 100 ML: 755 INJECTION, SOLUTION INTRAVENOUS at 18:19

## 2022-10-15 ASSESSMENT — ENCOUNTER SYMPTOMS
SHORTNESS OF BREATH: 0
WOUND: 1
MYALGIAS: 1
FEVER: 0
COUGH: 1

## 2022-10-15 ASSESSMENT — ACTIVITIES OF DAILY LIVING (ADL)
ADLS_ACUITY_SCORE: 35
ADLS_ACUITY_SCORE: 35

## 2022-10-15 NOTE — ED TRIAGE NOTES
Pt here with family member. She states pt had fall 3 weeks ago and sustained wound to R shin. Past 3 days foul smell and increased drainage from wound to R shin as well as R ankle swelling. CMS intact. ABC intact.

## 2022-10-15 NOTE — ED PROVIDER NOTES
History     Chief Complaint:    Leg Swelling      The history is provided by the patient and a relative. The history is limited by a language barrier. A  was used (Sami (daughter interprets)).      Thierry Samuel is a 87 year old male who presents with a right lower extremity wound.  The patient's daughter notes that 3 weeks ago the patient injured his right shin.  The patient has done this before and the daughter manages these at home.  She points out scattered areas of scab on the left lower extremity that appear to be in the process of appropriate healing.  She states that more recently, the wounds on the patient's right lower extremity do not appear to be healing and the leg now appears more swollen, slightly red, but also foul-smelling with some drainage.  They deny fevers, or shortness of breath.  The patient states that he always has some phlegm due to his COPD.    Review of Systems   Constitutional: Negative for fever.   Respiratory: Positive for cough. Negative for shortness of breath.    Cardiovascular: Negative for chest pain.   Musculoskeletal: Positive for myalgias.   Skin: Positive for wound.   All other systems reviewed and are negative.    Allergies:  No Known Allergies    Medications:    [START ON 10/16/2022] cephALEXin (KEFLEX) 500 MG capsule  albuterol (PROAIR HFA/PROVENTIL HFA/VENTOLIN HFA) 108 (90 Base) MCG/ACT inhaler  atorvastatin (LIPITOR) 10 MG tablet  ferrous gluconate (FERGON) 324 (38 Fe) MG tablet  fluticasone-salmeterol (ADVAIR) 500-50 MCG/ACT inhaler  hypromellose-dextran (ARTIFICAL TEARS) 0.1-0.3 % SOLN ophthalmic solution  ipratropium - albuterol 0.5 mg/2.5 mg/3 mL (DUONEB) 0.5-2.5 (3) MG/3ML neb solution  montelukast (SINGULAIR) 10 MG tablet  omeprazole (PRILOSEC) 20 MG DR capsule  POTASSIUM CHLORIDE PO  prednisoLONE 5 MG tablet  spironolactone (ALDACTONE) 25 MG tablet  tiotropium (SPIRIVA) 18 MCG inhalation capsule  torsemide (DEMADEX) 20 MG  tablet         Past Medical History:   Past Surgical History:     Past Medical History:   Diagnosis Date     A-fib (H)      Cardiac pacemaker in situ      CHF (congestive heart failure) (H) 09/27/2018     COPD (chronic obstructive pulmonary disease) (H)      GI bleed      Hypertension      Long term current use of anticoagulant therapy      Moderate to severe pulmonary hypertension (H)      SUSY (obstructive sleep apnea)      SUSY (obstructive sleep apnea)     Past Surgical History:   Procedure Laterality Date     APPENDECTOMY       CARDIAC SURGERY        Patient Active Problem List    Diagnosis Date Noted     Rectal bleeding 06/10/2022     Priority: Medium     Chronic atrial fibrillation (H) 02/18/2022     Priority: Medium     Essential hypertension 02/18/2022     Priority: Medium     Muscle pain 09/04/2021     Priority: Medium     Weakness generalized 09/04/2021     Priority: Medium     Dyspnea, unspecified type 09/04/2021     Priority: Medium     Acute on chronic respiratory failure with hypoxia (H) 08/10/2021     Priority: Medium     COPD exacerbation (H) 05/26/2021     Priority: Medium     Contusion of face, scalp and neck, initial encounter 05/26/2021     Priority: Medium     CHF (congestive heart failure) (H) 09/27/2018     Priority: Medium     COPD with exacerbation (H) 08/20/2013     Priority: Medium          Family History  Family History   Problem Relation Age of Onset     No Known Problems Mother      No Known Problems Father        Social History:  PCP: Trinity Brown  Presents to the ED with his daughter    Physical Exam     Patient Vitals for the past 24 hrs:   BP Temp Temp src Pulse Resp SpO2 Height Weight   10/15/22 1931 -- -- -- 62 25 -- -- --   10/15/22 1930 113/54 -- -- 60 25 -- -- --   10/15/22 1800 130/59 -- -- 60 (!) 39 95 % -- --   10/15/22 1745 125/63 -- -- 59 28 99 % -- --   10/15/22 1715 131/62 -- -- 61 -- -- -- --   10/15/22 1645 135/65 -- -- (!) 47 -- -- -- --   10/15/22 1317 126/55 --  "-- -- -- -- 1.549 m (5' 1\") 69.9 kg (154 lb)   10/15/22 1316 -- 98.4  F (36.9  C) Temporal 67 16 96 % -- --       Physical Exam  Constitutional: Vital signs reviewed as above.   Head: No external signs of trauma noted.  Eyes: Pupils are equal, round, and reactive to light.   Neck: No JVD noted  Cardiovascular: normal rate, Regular rhythm and normal heart sounds.  No murmur heard. Difficult to palpate right DP pulse  Pulmonary/Chest: No respiratory distress. Patient has B/L wheezes. Patient has no rales.   Gastrointestinal: Soft. There is no tenderness.   Musculoskeletal/Extremities: +1 pitting edema on the right ankle pitting edema noted. Normal tone.  Neurological: Patient is alert and oriented to person, place, and time.   Skin: There is no diaphoresis noted. Mild erythema on the RLE especially around the distal wound. There is granulation tissue in both wounds and drainage on the most distal wound.  Psychiatric: The patient appears calm.                Emergency Department Course       Imaging:  CT Tibia Fibula Lower Leg Right w Contrast   Final Result   IMPRESSION:   1.  Soft tissue wound-ulcer in the anterior aspect of the distal calf.   2.  Negative for soft tissue gas, peripherally enhancing soft tissue fluid collection, or deep fascia abnormality.   3.  No fracture or CT findings to suggest osteomyelitis.   4.  Nonspecific subcutaneous thickening in the mid and distal calf. Differential considerations include lymphedema and cellulitis.             Laboratory:  Labs Ordered and Resulted from Time of ED Arrival to Time of ED Departure   COMPREHENSIVE METABOLIC PANEL - Abnormal       Result Value    Sodium 137      Potassium 5.1      Chloride 99      Carbon Dioxide (CO2) 28      Anion Gap 10      Urea Nitrogen 21.3      Creatinine 0.81      Calcium 10.0      Glucose 115 (*)     Alkaline Phosphatase 100      AST 19      ALT 17      Protein Total 7.4      Albumin 4.2      Bilirubin Total 0.4      GFR Estimate 86  "    CBC WITH PLATELETS AND DIFFERENTIAL - Abnormal    WBC Count 9.2      RBC Count 5.00      Hemoglobin 14.7      Hematocrit 47.1      MCV 94      MCH 29.4      MCHC 31.2 (*)     RDW 14.6      Platelet Count 345      % Neutrophils 80      % Lymphocytes 9      % Monocytes 10      % Eosinophils 0      % Basophils 0      % Immature Granulocytes 1      NRBCs per 100 WBC 0      Absolute Neutrophils 7.4      Absolute Lymphocytes 0.8      Absolute Monocytes 0.9      Absolute Eosinophils 0.0      Absolute Basophils 0.0      Absolute Immature Granulocytes 0.1      Absolute NRBCs 0.0     INR - Normal    INR 1.04     PARTIAL THROMBOPLASTIN TIME - Normal    aPTT 28     COVID-19 VIRUS (CORONAVIRUS) BY PCR - Normal    SARS CoV2 PCR Negative     BLOOD CULTURE   BLOOD CULTURE       Procedures:      Emergency Department Course:           Reviewed:    I reviewed nursing notes, vitals and past history    Assessments/Consults:   ED Course as of 10/15/22 2332   Sat Oct 15, 2022   1642 Dr. Verdin' evaluation   1852 Rechecked and updated.       Interventions:  Medications   ipratropium - albuterol 0.5 mg/2.5 mg/3 mL (DUONEB) neb solution 3 mL (3 mLs Nebulization Given 10/15/22 1735)   iopamidol (ISOVUE-370) solution 500 mL (100 mLs Intravenous Given 10/15/22 1819)   Sodium Chloride for CT Scan Flush Use (65 mLs Intravenous Given 10/15/22 1819)   cefTRIAXone (ROCEPHIN) 1 g vial to attach to  mL bag for ADULTS or NS 50 mL bag for PEDS (0 g Intravenous Stopped 10/15/22 1937)       Disposition:  The patient was discharged to home.    Impression & Plan      CMS Diagnoses:         Medical Decision Making:  This 87-year-old male patient presents to the ED due to a leg wound.  Please see the HPI and exam for specifics.  A broad differential was considered including deep space infections in the soft tissues and bone, vascular disease, etc.  The above work-up was undertaken.  Fortunately, there is no soft tissue gas nor evidence of  osteomyelitis.  Laboratory studies are reassuring.  The patient did have some wheezing and notes that due to his COPD he always has a cough.  Wheezing was better after a DuoNeb treatment.  The patient received wound care from nursing on the wounds on the right lower extremity.  I think he can be safely discharged though he should go home on antibiotics.  He was given Rocephin IV prior to discharge.  I will encourage close follow-up and discussion with primary care for home wound care.  I also placed an order to have the [facility] wound care service contact the patient and daughter.  Anticipatory guidance given prior to discharge.    Diagnosis:    ICD-10-CM    1. Open wound of right lower extremity, initial encounter  S81.801A Wound Care Referral      2. Wheezing  R06.2           Discharge Medications:  Discharge Medication List as of 10/15/2022  7:43 PM      START taking these medications    Details   cephALEXin (KEFLEX) 500 MG capsule Take 1 capsule (500 mg) by mouth 4 times daily for 10 days, Disp-40 capsule, R-0, Local Print                    Lopez Verdin, DO  10/15/22 4495     24

## 2022-10-16 NOTE — DISCHARGE INSTRUCTIONS
Diagnosis: Wound of the right lower extremity (this may be due to narrowing of the blood vessels causing decreased blood flow to the right leg); wheezing.  What do you do next:   Continue your home medications unless we have specifically changed them  Take the antibiotics I prescribed as directed.  I placed an order to have the [facility] wound care service contact you about establishing follow-up wound care.  You can also discuss this with your primary care clinic and you can also be educated on home wound care.  Follow up as indicated below    When do you return: If you have uncontrolled fevers, persistent foul-smelling or bloody drainage from the wound, rapidly spreading redness around the wound, severe shortness of breath, or any other symptoms that concern you, please return to the ED for reevaluation.    Thank you for allowing us to care for you today.

## 2022-10-17 LAB
HCO3 BLDV-SCNC: 33 MMOL/L (ref 21–28)
LACTATE BLD-SCNC: 2 MMOL/L
PCO2 BLDV: 59 MM HG (ref 40–50)
PH BLDV: 7.36 [PH] (ref 7.32–7.43)
PO2 BLDV: <15 MM HG (ref 25–47)

## 2022-10-18 ENCOUNTER — TELEPHONE (OUTPATIENT)
Dept: EMERGENCY MEDICINE | Facility: CLINIC | Age: 87
End: 2022-10-18

## 2022-10-18 ENCOUNTER — HOSPITAL ENCOUNTER (OUTPATIENT)
Dept: WOUND CARE | Facility: CLINIC | Age: 87
Discharge: HOME | End: 2022-10-18
Attending: PHYSICIAN ASSISTANT | Admitting: PHYSICIAN ASSISTANT
Payer: MEDICAID

## 2022-10-18 VITALS
SYSTOLIC BLOOD PRESSURE: 132 MMHG | BODY MASS INDEX: 30.23 KG/M2 | HEART RATE: 69 BPM | DIASTOLIC BLOOD PRESSURE: 63 MMHG | TEMPERATURE: 98.9 F | RESPIRATION RATE: 16 BRPM | HEIGHT: 60 IN | WEIGHT: 154 LBS

## 2022-10-18 DIAGNOSIS — L97.912 ULCER OF RIGHT LOWER EXTREMITY WITH FAT LAYER EXPOSED (H): ICD-10-CM

## 2022-10-18 LAB
BACTERIA WND CULT: ABNORMAL
BACTERIA WND CULT: ABNORMAL
GRAM STAIN RESULT: ABNORMAL

## 2022-10-18 PROCEDURE — 97597 DBRDMT OPN WND 1ST 20 CM/<: CPT | Performed by: PHYSICIAN ASSISTANT

## 2022-10-18 PROCEDURE — G0463 HOSPITAL OUTPT CLINIC VISIT: HCPCS | Mod: 25

## 2022-10-18 PROCEDURE — 99204 OFFICE O/P NEW MOD 45 MIN: CPT | Mod: 25 | Performed by: PHYSICIAN ASSISTANT

## 2022-10-18 RX ORDER — FLUCONAZOLE 100 MG/1
100 TABLET ORAL DAILY
Qty: 7 TABLET | Refills: 0 | Status: SHIPPED | OUTPATIENT
Start: 2022-10-18 | End: 2022-10-25

## 2022-10-18 RX ORDER — DOXYCYCLINE 100 MG/1
100 CAPSULE ORAL 2 TIMES DAILY
Qty: 60 CAPSULE | Refills: 0 | Status: SHIPPED | OUTPATIENT
Start: 2022-10-18 | End: 2022-11-17

## 2022-10-18 NOTE — DISCHARGE INSTRUCTIONS
Thierry Samuel      1935    A DME order for supplies has been placed to [facility]. If there are any issues with your order including not receiving the order please call [facility] at [phone] option 3. They can also provide a tracking number for you if you had supplies shipped to you.    Wound Dressing Change: Right anterior and anterior distal lower leg  Cleanse with mild unscented soap and water  Apply 1/10th piece of 4x5 hydrofera blue ready transfer to each wound  Then cover with 1 5x9 ABD and secure with 1 roll gauze and tape  Change daily    Whenever you sit raise your ankle above your hips to promote wound healing.      Mayela Love PA-C October 18, 2022    Call us at [phone] if you have any questions about your wounds, have redness or swelling around your wound, have a fever of 101 or greater or if you have any other problems or concerns. We answer the phone Monday through Friday 8 am to 4 pm, please leave a message as we check the voicemail frequently throughout the day.     If you had a positive experience please indicate that on your patient satisfaction survey form that [facility] will be sending you.    It was a pleasure meeting with you today.  Thank you for allowing me and my team the privilege of caring for you today.  YOU are the reason we are here, and I truly hope we provided you with the excellent service you deserve.  Please let us know if there is anything else we can do for you so that we can be sure you are leaving completely satisfied with your care experience.      If you have any billing related questions please call the [facility] Business office at [phone]. The clinic staff does not handle billing related matters.    If you are scheduled to have a follow up appointment, you will receive a reminder call the day before your visit. On the appointment day please arrive 15 minutes prior to your appointment time. If you are unable to  keep that appointment, please call the clinic to cancel or reschedule. If you are more than 10 minutes late or greater for your appointment, the clinic policy is that you may be asked to reschedule.

## 2022-10-18 NOTE — PROGRESS NOTES
Patient arrived for wound care visit. Certified Wound Care Nurse time spent evaluating patient record, completed a full evaluation and documented wound(s) & naveed-wound skin; provided recommendation based on treatment plan. Applied dressing, reviewed discharge instructions, patient education, and discussed plan of care with appropriate medical team staff members and patient and/or family members.

## 2022-10-18 NOTE — PROGRESS NOTES
[facility]    ASSESSMENT:   1. (L97.912) Ulcer of right lower extremity with fat layer exposed (H)  2. Cellulitis - culture growing candida and MRSA    PLAN/DISCUSSION:   1. Discontinue keflex and start doxy 100mg BID x 10d and fluconazole 100mg every day x 7d  2. Edema very mild, will hold on compression due to concern of inducing more skin damage  3. Wound care plan: Hydrofera Blue transfer ready, abds and gauze, change daily  4. If wounds not significantly improved next month will consider ordering ABIs, besides age has no other risk factors for PAD and has palpable DP but difficult to appreviate PT  5. See bottom of note for detailed wound care and patient instructions    HISTORY OF PRESENT ILLNESS:   Thierry Samuel is an 87 year old male with COPD, CHF, a fib (not anticoagulated), and HTN who presents today for a RLE ulcer. History is interpreted through his daughter as he speak Lithuanian. Injured his leg 3 weeks ago. Was seen in the ED on 10/15 for this wound due to concerns of poor healing and infection. Had CT which was negative for deep pathology. Was given 1g rocephin and discharged on PO keflex. Wound culture now growing MRSA and candida, he has not been notified of these results. Still having significant pain. Per daughter he frequent ibumps into things and has not had problems with healing in the past. He utilizes oil on his skin frequently. Has slight edema at the end of the day but has not been able to tolerate compression garments due to sensitive skin and skin tears.     TREATMENT COURSE:  10/18/22: Presents for initial visit at our clinic.     VITALS: /63 (BP Location: Right arm)   Pulse 69   Temp 98.9  F (37.2  C) (Temporal)   Resp 16   Ht 1.524 m (5')   Wt 69.9 kg (154 lb)   BMI 30.08 kg/m       PHYSICAL EXAM:  GENERAL: Patient is alert and oriented and in no acute distress  CV: palpable DP pulse, unable to appreciate PT  INTEGUMENTARY:   Wound (used by OP  [facility] only) 10/18/22 1426 Right anterior;lower leg laceration (Active)   Thickness/Stage full thickness 10/18/22 1400   Base slough;pink 10/18/22 1400   Periwound intact;swelling 10/18/22 1400   Periwound Temperature warm 10/18/22 1400   Periwound Skin Turgor soft 10/18/22 1400   Edges open 10/18/22 1400   Length (cm) 2.4 10/18/22 1400   Width (cm) 1.4 10/18/22 1400   Depth (cm) 0.3 10/18/22 1400   Wound (cm^2) 3.36 cm^2 10/18/22 1400   Wound Volume (cm^3) 1.01 cm^3 10/18/22 1400   Drainage Characteristics/Odor serosanguineous;yellow 10/18/22 1400   Drainage Amount moderate 10/18/22 1400       Wound (used by OP [facility] only) 10/18/22 1427 Right anterior;distal;lower leg laceration (Active)   Thickness/Stage full thickness 10/18/22 1400   Base slough 10/18/22 1400   Periwound intact;swelling 10/18/22 1400   Periwound Temperature warm 10/18/22 1400   Periwound Skin Turgor soft 10/18/22 1400   Edges open 10/18/22 1400   Length (cm) 3.4 10/18/22 1400   Width (cm) 1.7 10/18/22 1400   Depth (cm) 0.6 10/18/22 1400   Wound (cm^2) 5.78 cm^2 10/18/22 1400   Wound Volume (cm^3) 3.47 cm^3 10/18/22 1400   Drainage Characteristics/Odor serosanguineous;yellow 10/18/22 1400   Drainage Amount moderate 10/18/22 1400           PROCEDURE (leg wounds): 4% topical lidocaine was applied to the wound by the nursing staff. Patient was determined to be capable of making their own medical decisions and informed consent was obtained. Using a 15 blade a selective debridement of non-viable tissue was performed of <20 cm. Hemostasis was achieved with pressure. The patient tolerated the procedure well.      MDM: 50 minutes were spent on the date of the visit reviewing previous chart notes, evaluating patient and developing the treatment plan, this excludes any time spent on procedures.     PATIENT INSTRUCTIONS    Further instructions from your care team       Thierry Samuel      1935    A DME order for supplies has been placed to [facility]. If there are any issues with your order including not receiving the order please call [facility] at [phone] option 3. They can also provide a tracking number for you if you had supplies shipped to you.    Wound Dressing Change: Right anterior and anterior distal lower leg  Cleanse with mild unscented soap and water  Apply 1/10th piece of 4x5 hydrofera blue ready transfer to each wound  Then cover with 1 5x9 ABD and secure with 1 roll gauze and tape  Change daily    Whenever you sit raise your ankle above your hips to promote wound healing.     Electronically signed by Mayela Love PA-C on October 18, 2022

## 2022-10-18 NOTE — TELEPHONE ENCOUNTER
[facility] () Emergency Department Lab result notification    [place] ED lab result protocol used  Culture protocol    Reason for call  Notify of lab results, assess symptoms,  review ED providers recommendations/discharge instructions (if necessary) and advise per ED lab result f/u protocol    Lab Result (including Rx patient on, if applicable)  Final Wound Aerobic Bacterial Culture (specimen - Leg, below knee, right; wound) report on 10/18/22  [facility] Emergency Dept discharge antibiotic prescribed: Cephalexin (Keflex) 500 mg capsule, 1 capsule (500 mg) by mouth 4 times daily for 10 days.  #1. Bacteria, Staphylococcus aureus MRSA,  is [RESISTANT] to antibiotic.  #2: Candida parapsilosis complex Susceptibilities not routinely done  Incision and Drainage performed in the [place] ED: NO  Recommendations in treatment per [facility] ED lab result culture protocol    Information table from Emergency Dept Provider visit on 10/15/22  Symptoms reported at ED visit (Chief complaint, HPI) Chief Complaint:     Leg Swelling        The history is provided by the patient and a relative. The history is limited by a language barrier. A  was used (Thai (daughter interprets)).      Thierry Samuel is a 87 year old male who presents with a right lower extremity wound.  The patient's daughter notes that 3 weeks ago the patient injured his right shin.  The patient has done this before and the daughter manages these at home.  She points out scattered areas of scab on the left lower extremity that appear to be in the process of appropriate healing.  She states that more recently, the wounds on the patient's right lower extremity do not appear to be healing and the leg now appears more swollen, slightly red, but also foul-smelling with some drainage.  They deny fevers, or shortness of breath.  The patient states that he always has some phlegm due to his COPD.   Significant  Medical hx, if applicable (i.e. CKD, diabetes) Reviewed   Allergies No Known Allergies   Weight, if applicable Wt Readings from Last 2 Encounters:   10/15/22 69.9 kg (154 lb)   06/10/22 69.8 kg (153 lb 14.4 oz)      Coumadin/Warfarin [Yes /No] NO   Creatinine Level (mg/dl) Creatinine   Date Value Ref Range Status   10/15/2022 0.81 0.67 - 1.17 mg/dL Final   05/28/2021 0.78 0.66 - 1.25 mg/dL Final      Creatinine clearance (ml/min), if applicable Serum creatinine: 0.81 mg/dL 10/15/22 1704  Estimated creatinine clearance: 54.9 mL/min   ED providers Impression and Plan (applicable information) CMS Diagnoses:       Medical Decision Making:  This 87-year-old male patient presents to the ED due to a leg wound.  Please see the HPI and exam for specifics.  A broad differential was considered including deep space infections in the soft tissues and bone, vascular disease, etc.  The above work-up was undertaken.  Fortunately, there is no soft tissue gas nor evidence of osteomyelitis.  Laboratory studies are reassuring.  The patient did have some wheezing and notes that due to his COPD he always has a cough.  Wheezing was better after a DuoNeb treatment.  The patient received wound care from nursing on the wounds on the right lower extremity.  I think he can be safely discharged though he should go home on antibiotics.  He was given Rocephin IV prior to discharge.  I will encourage close follow-up and discussion with primary care for home wound care.  I also placed an order to have the [place] wound care service contact the patient and daughter.  Anticipatory guidance given prior to discharge.     Diagnosis:      ICD-10-CM     1. Open wound of right lower extremity, initial encounter  S81.801A Wound Care Referral       2. Wheezing  R06.2               Discharge Medications:      Discharge Medication List as of 10/15/2022  7:43 PM           START taking these medications     Details   cephALEXin (KEFLEX) 500 MG capsule Take 1  capsule (500 mg) by mouth 4 times daily for 10 days, Disp-40 capsule, R-0, Local Print                Lopez Verdin DO  10/15/22 0348     ED diagnosis  Open wound of right lower extremity, initial encounter  Wheezing   ED provider  Lopez Verdin DO       RN Assessment (Patient s current Symptoms), include time called.  12:46 PM with assistance of  call to patient - daughter is there and was present at visit - daughter has been calling to try to set up wound care appointment but been unsucceful - continues with drainage/odor - painful - improved with elevation - wounds have not been changed since emergency department daughter was advised to have dressings changed at wound care clinic - hard to tell overall how wounds are doing because covered  - overall pain is the same  Right leg wound 1#: continues with drainage/odor - swelling is significant but the same - wounds are covered unable to see if spreading redness  Right leg wound 2#: worse drainage/odor on this wound swelling is significant but the same - wounds are covered unable to see if spreading redness   Fever: No    Writer was able to contact wound clinic with daughter and set up wound check for today 10/18 2PM they will try their best to make appointment - otherwise scheduling advised appointments are typically 4-6 weeks out.  Writer encouraged daughter to focus on getting to appointment - writer will consult ED provider and give return call after appointment or she may return our call after appointment for recommendations.    [facility] - [place] [place] pended for usej        [facility] Emergency Department Provider Name & Recommendations  S-(situation): 87 M - final aerobic wound culture of right lower extremities positive for MRSA infection with candida RESISTANT to keflex seeking treatment recommendations.  B-(background): See above  A-(assessment): see above   R-(recommendations): ED consult     Consultation Time:   10/18/2022 1:23 PM  Consulted with [facility] () Emergency Department Provider, MD Radha.   Emergency Dept Provider Recommendations:  Provider did discuss culturing top of wound may not give accurate bacterial growth results - lets await visit with wound care clinic today 10/18/22 - please monitor chart for appropriate antibiotic change due to resistance  - plan to ED consult if no antibiotic change is made.          Adam Cash RN  [facility]  Emergency Dept Lab Result RN  Ph# [phone]     Copy of Lab result   Wound Aerobic Bacterial Culture Routine with Gram Stain  Order: [id number]   Status: Final result      Visible to patient: No (inaccessible in MyChart)     Specimen Information: Leg, Below Knee, Right; Wound    3 Result Notes  Culture 4+ Staphylococcus aureus MRSA Abnormal        2+ Candida parapsilosis complex Abnormal     Susceptibilities not routinely done            Gram Stain Result   Abnormal   4+ Gram positive cocci      1+ Yeast      2+ WBC seen              Resulting Agency: [facility]     Susceptibility    Staphylococcus aureus MRSA (1)    Antibiotic Interpretation Sensitivity  Method Status    Oxacillin Resistant >=4 ug/mL RAGHAV Final     Oxacillin susceptible isolates are susceptible to cephalosporins (example: cefazolin and cephalexin) and beta lactam combination agents. Oxacillin resistant isolates are resistant to these agents.       Gentamicin Resistant >=16 ug/mL RAGHAV Final    Erythromycin Resistant >=8 ug/mL RAGHAV Final    Clindamycin Resistant >=8 ug/mL RAGHAV Final    Linezolid Susceptible 4 ug/mL RAGHAV Final    Vancomycin Susceptible 2 ug/mL RAGHAV Final    Tetracycline Susceptible <=1 ug/mL RAGHAV Final    Trimethoprim/Sulfamethoxazole Resistant >16/304 ug/mL RAGHAV Final    Susceptibility Comments    MRSA requires contact precautions.         Specimen Collected: 10/15/22  5:04 PM Last Resulted: 10/18/22  8:24 AM

## 2022-10-20 LAB
BACTERIA BLD CULT: NO GROWTH
BACTERIA BLD CULT: NO GROWTH

## 2022-10-26 ENCOUNTER — HOSPITAL ENCOUNTER (OUTPATIENT)
Dept: WOUND CARE | Facility: CLINIC | Age: 87
Discharge: HOME | End: 2022-10-26
Attending: PHYSICIAN ASSISTANT | Admitting: PHYSICIAN ASSISTANT
Payer: MEDICAID

## 2022-10-26 VITALS — SYSTOLIC BLOOD PRESSURE: 136 MMHG | TEMPERATURE: 97.1 F | DIASTOLIC BLOOD PRESSURE: 67 MMHG | HEART RATE: 66 BPM

## 2022-10-26 DIAGNOSIS — L97.912 ULCER OF RIGHT LOWER EXTREMITY WITH FAT LAYER EXPOSED (H): Primary | ICD-10-CM

## 2022-10-26 PROCEDURE — 97602 WOUND(S) CARE NON-SELECTIVE: CPT

## 2022-10-26 NOTE — PROGRESS NOTES
[facility] Nurse Note    Subject: Thierry Samuel presents for nurse only visit for wound check and dressing change. Pain upon dressing removal and slough. Mild edema. Conferred with Steffanie Love PA-C. Added Plurogel and Spandage.      Exam:  /67 (BP Location: Left arm)   Pulse 66   Temp 97.1  F (36.2  C) (Temporal)   Wound (used by OP [facility] only) 10/18/22 1426 Right anterior;lower leg laceration (Active)   Thickness/Stage full thickness 10/26/22 1356   Base slough;pink 10/26/22 1356   Periwound intact;swelling 10/26/22 1356   Periwound Temperature warm 10/26/22 1356   Periwound Skin Turgor soft 10/26/22 1356   Edges open 10/26/22 1356   Length (cm) 2.2 10/26/22 1356   Width (cm) 1.1 10/26/22 1356   Depth (cm) 0.3 10/26/22 1356   Wound (cm^2) 2.42 cm^2 10/26/22 1356   Wound Volume (cm^3) 0.73 cm^3 10/26/22 1356   Wound healing % 27.98 10/26/22 1356   Drainage Characteristics/Odor serosanguineous;yellow 10/26/22 1356   Drainage Amount moderate 10/26/22 1356   Care, Wound non-select wound debridement performed 10/26/22 1356       Wound (used by OP I only) 10/18/22 1427 Right anterior;distal;lower leg laceration (Active)   Thickness/Stage full thickness 10/26/22 1356   Base slough 10/26/22 1356   Periwound intact;swelling 10/26/22 1356   Periwound Temperature warm 10/26/22 1356   Periwound Skin Turgor soft 10/26/22 1356   Edges open 10/26/22 1356   Length (cm) 3.1 10/26/22 1356   Width (cm) 1.9 10/26/22 1356   Depth (cm) 0.4 10/26/22 1356   Wound (cm^2) 5.89 cm^2 10/26/22 1356   Wound Volume (cm^3) 2.36 cm^3 10/26/22 1356   Wound healing % -1.9 10/26/22 1356   Drainage Characteristics/Odor serosanguineous;creamy 10/26/22 1356   Drainage Amount large 10/26/22 1356   Care, Wound non-select wound debridement performed 10/26/22 1356     Procedure: Topical anesthetic of 4% lidocaine was applied,non-selective debridement was performed, no bleeding occurred. Patient tolerated procedure  well.      Wound redressed as directed below.    Plan: Patient will return to the clinic next month.  October 26, 2022            Further instructions from your care team       Thierry Samuel      1935    A DME order for supplies has been placed to [facility] 10/18 and today. If there are any issues with your order including not receiving the order please call [facility] at [phone] option 3. They can also provide a tracking number for you if you had supplies shipped to you.    Wound Dressing Change: Right anterior and anterior distal lower leg  Cleanse with mild unscented soap and water  Apply Plurogel to 1/10th piece of 4x5 hydrofera blue ready transfer, then apply to each wound  Apply Spandage size 6 (MTSpandage size 3) from just above toes to just below knee  Then cover with 1 5x9 ABD (on top of Spandage) and secure with 1 roll gauze and tape  Change daily or every other day    Whenever you sit raise your ankle above your hips to promote wound healing.    To Order more plurogel: shop[employer][email or web address] or call [phone] to place an order for 0.7 oz tube  Use PLUROHEALS (all caps) at checkout in the discount code section to decrease price to $55     You may wash and re-use Spandage for up to a week (hand wash with mild soap, rinse and hang to dry)     Mayela Love PA-C October 26, 2022    Call us at [phone] if you have any questions about your wounds, have redness or swelling around your wound, have a fever of 101 or greater or if you have any other problems or concerns. We answer the phone Monday through Friday 8 am to 4 pm, please leave a message as we check the voicemail frequently throughout the day.     If you had a positive experience please indicate that on your patient satisfaction survey form that [facility] will be sending you.    It was a pleasure meeting with you today.  Thank you for allowing me and my team the privilege of caring for you today.   YOU are the reason we are here, and I truly hope we provided you with the excellent service you deserve.  Please let us know if there is anything else we can do for you so that we can be sure you are leaving completely satisfied with your care experience.      If you have any billing related questions please call the [facility] Business office at [phone]. The clinic staff does not handle billing related matters.    If you are scheduled to have a follow up appointment, you will receive a reminder call the day before your visit. On the appointment day please arrive 15 minutes prior to your appointment time. If you are unable to keep that appointment, please call the clinic to cancel or reschedule. If you are more than 10 minutes late or greater for your appointment, the clinic policy is that you may be asked to reschedule.

## 2022-11-15 ENCOUNTER — HOSPITAL ENCOUNTER (OUTPATIENT)
Dept: WOUND CARE | Facility: CLINIC | Age: 87
Discharge: HOME | End: 2022-11-15
Attending: SURGERY | Admitting: SURGERY
Payer: MEDICAID

## 2022-11-15 VITALS — DIASTOLIC BLOOD PRESSURE: 61 MMHG | HEART RATE: 66 BPM | SYSTOLIC BLOOD PRESSURE: 109 MMHG | TEMPERATURE: 98.2 F

## 2022-11-15 DIAGNOSIS — L97.912 ULCER OF RIGHT LOWER EXTREMITY WITH FAT LAYER EXPOSED (H): Primary | ICD-10-CM

## 2022-11-15 PROCEDURE — 97602 WOUND(S) CARE NON-SELECTIVE: CPT

## 2022-11-15 PROCEDURE — 99213 OFFICE O/P EST LOW 20 MIN: CPT | Performed by: SURGERY

## 2022-11-15 RX ORDER — NYSTATIN 100000/ML
SUSPENSION, ORAL (FINAL DOSE FORM) ORAL
COMMUNITY
Start: 2022-04-06

## 2022-11-15 NOTE — PROGRESS NOTES
[facility] Progress Note    Subject: Thierry Samuel, seen by physician assistant Ya Love October 18, 2022, patient non-English speaker, daughter served as , she states he has a history of slowly healing wounds, previous anticoagulation for atrial fibrillation that was discontinued, history of congestive heart failure, right lower extremity edema, no current tobacco utilization.  No heel ulceration.  No history of deep venous thromboses.  History of COPD.  Resides with his daughter.  She performs dressing changes.    Patient Active Problem List   Diagnosis     COPD with exacerbation (H)     CHF (congestive heart failure) (H)     COPD exacerbation (H)     Contusion of face, scalp and neck, initial encounter     Acute on chronic respiratory failure with hypoxia (H)     Muscle pain     Weakness generalized     Dyspnea, unspecified type     Chronic atrial fibrillation (H)     Essential hypertension     Rectal bleeding     Ulcer of right lower extremity with fat layer exposed (H)     Past Medical History:   Diagnosis Date     A-fib (H)     s/p pacemaker     Cardiac pacemaker in situ      CHF (congestive heart failure) (H) 09/27/2018     COPD (chronic obstructive pulmonary disease) (H)      GI bleed      Hypertension      Long term current use of anticoagulant therapy      Moderate to severe pulmonary hypertension (H)      SUSY (obstructive sleep apnea)      SUSY (obstructive sleep apnea)      Exam:  /61 (BP Location: Left arm)   Pulse 66   Temp 98.2  F (36.8  C)   Wound (used by OP [facility]I only) 10/18/22 1426 Right anterior;lower leg laceration (Active)   Thickness/Stage full thickness 11/15/22 1441   Base slough;pink 11/15/22 1441   Periwound intact;swelling 11/15/22 1441   Periwound Temperature warm 11/15/22 1441   Periwound Skin Turgor soft 11/15/22 1441   Edges open 11/15/22 1441   Length (cm) 2 11/15/22 1441   Width (cm) 1.5 11/15/22 1441   Depth (cm) 0.4 11/15/22  1441   Wound (cm^2) 3 cm^2 11/15/22 1441   Wound Volume (cm^3) 1.2 cm^3 11/15/22 1441   Wound healing % 10.71 11/15/22 1441   Drainage Characteristics/Odor serosanguineous;yellow 11/15/22 1441   Drainage Amount moderate 11/15/22 1441   Care, Wound non-select wound debridement performed 10/26/22 1356       Wound (used by OP WHI only) 10/18/22 1427 Right anterior;distal;lower leg laceration (Active)   Thickness/Stage full thickness 11/15/22 1441   Base slough 11/15/22 1441   Periwound intact;swelling 11/15/22 1441   Periwound Temperature warm 11/15/22 1441   Periwound Skin Turgor soft 11/15/22 1441   Edges open 11/15/22 1441   Length (cm) 2.1 11/15/22 1441   Width (cm) 2.8 11/15/22 1441   Depth (cm) 0.6 11/15/22 1441   Wound (cm^2) 5.88 cm^2 11/15/22 1441   Wound Volume (cm^3) 3.53 cm^3 11/15/22 1441   Wound healing % -1.73 11/15/22 1441   Drainage Characteristics/Odor serosanguineous;creamy 11/15/22 1441   Drainage Amount large 11/15/22 1441   Care, Wound non-select wound debridement performed 10/26/22 1356     Non-English-speaking, nonpalpable right dorsalis pedis or posterior tibial pulse, palpable prominent right popliteal pulse.  Ischemic appearing right lower extremity ulcerations with minimal granulation tissue, no bone or tendon exposure.  Previous CT scan demonstrated no evidence of tibial osteomyelitis or fractures.  Edema present.  Multiple previous scars from trauma.  No heel ulceration.        Impression: Chronic right lower extremity ulcerations, traumatically induced after fall in shower, ischemic appearing, nonpalpable right pedal pulses with prominent right popliteal pulse.    Plan: Obtain ankle-brachial indices and duplex ultrasound arteries right lower extremity to evaluate for possible popliteal artery aneurysm, given his atrial fibrillation history not on anticoagulation, is also possible he has a embolic event to the right lower extremity.  They have PluroGel, cover with Xeroform, navy strip  EdemaWear, utilize over a stocking Doreen.  Can change dressings every other day.  His daughter is performing dressing changes.  Also apply Vashe hypochlorous acid to wounds prior to PluroGel application. Patient will return to the clinic in 2-3 weeks time      Further instructions from your care team       Thierry Monicaserena Lizzie      1935    A DME order for supplies has been placed to [facility].   If there are any issues with your order including not receiving the order please call [facility] at [phone] option 3.   They can also provide a tracking number for you if you had supplies shipped to you.    If you are not already taking, start taking:   Medications/supplements to aid in healin. Vitamin D3 5,000 iu per day  2. Tereza C 1,000 mg take daily  3. Vitamin B Complex with folic acid take 1 tablet daily   4. Vitamin B 12 1000 mcg daily       Please call the scheduling  to schedule your SERGE testing appointment at [facility]: [phone]  Please have testing completed prior to return visit on 22     Wound Dressing Change: Right anterior and anterior distal lower leg  -After cleansing with mild unscented soap and water, apply small amount of VASHE on gauze, lay into wound bed, let sit for 5-10 minutes, remove gauze (do not rinse) then apply dressing:   -Apply thin layer of Plurogel to wound bed  -Apply 1/10th piece of 5x9 piece of Xeroform to each wound  -Then apply Purple Stripe EdemaWear from toes to knee directly over the Xeroform  -Then outside of Edema Wear, cover with 1 5x9 ABD and secure with 1 roll gauze and tape  Change every other day    Put plurogel in the fridge where it becomes thinner.   You will use less of the plurogel this way, extending the use of the tube, and the plurogel will be more soothing.    To Order more plurogel: shop.[email or web address] or call [phone] to place an order for 0.7 oz tube  Use discount code PLUROHEALS (all  caps) at checkout in the discount code section to decrease price to $55     EdemaWear should be worn 24/7 unless bathing/showering or changing the dressing.   You will wash and reuse the EdemaWear. DO NOT CUT THE EDEMAWEAR. IF IT IS TOO LONG THEN CUFF THE EDEMAWEAR (the EdemaWear can shrink length wise with washing).     Whenever you sit raise your ankle above your hips to promote wound healing.     WESLEY Aguirre M.D. November 15, 2022    Call us at [phone] if you have any questions about your wounds, have redness or swelling around your wound, have a fever of 101 or greater or if you have any other problems or concerns. We answer the phone Monday through Friday 8 am to 4 pm, please leave a message as we check the voicemail frequently throughout the day.     If you had a positive experience please indicate that on your patient satisfaction survey form that [facility] will be sending you.    It was a pleasure meeting with you today.  Thank you for allowing me and my team the privilege of caring for you today.  YOU are the reason we are here, and I truly hope we provided you with the excellent service you deserve.  Please let us know if there is anything else we can do for you so that we can be sure you are leaving completely satisfied with your care experience.      If you have any billing related questions please call the [facility] Business office at [phone]. The clinic staff does not handle billing related matters.    If you are scheduled to have a follow up appointment, you will receive a reminder call the day before your visit. On the appointment day please arrive 15 minutes prior to your appointment time. If you are unable to keep that appointment, please call the clinic to cancel or reschedule. If you are more than 10 minutes late or greater for your appointment, the clinic policy is that you may be asked to reschedule.           Phan Aguirre MD on 11/15/2022 at 3:00 PM        Dictated  using Dragon voice recognition software which may result in transcription errors

## 2022-11-15 NOTE — DISCHARGE INSTRUCTIONS
Thierry Samuel      1935    A DME order for supplies has been placed to [facility].   If there are any issues with your order including not receiving the order please call [facility] at [phone] option 3.   They can also provide a tracking number for you if you had supplies shipped to you.    If you are not already taking, start taking:   Medications/supplements to aid in healing:  Vitamin D3 5,000 iu per day  Tereza C 1,000 mg take daily  Vitamin B Complex with folic acid take 1 tablet daily   Vitamin B 12 1000 mcg daily       Please call the scheduling  to schedule your SERGE testing and arterial duplex ultrasound right leg appointment at [facility]: [phone]  Please have testing completed prior to return visit on 12/13/22     Wound Dressing Change: Right anterior and anterior distal lower leg  -After cleansing with mild unscented soap and water, apply small amount of VASHE on gauze, lay into wound bed, let sit for 5-10 minutes, remove gauze (do not rinse) then apply dressing:   -Apply thin layer of Plurogel to wound bed  -Apply 1/10th piece of 5x9 piece of Xeroform to each wound  -Then apply Purple Stripe EdemaWear from toes to knee directly over the Xeroform  -Then outside of Edema Wear, cover with 1 5x9 ABD and secure with 1 roll gauze and tape  Change every other day    Put plurogel in the fridge where it becomes thinner.   You will use less of the plurogel this way, extending the use of the tube, and the plurogel will be more soothing.    To Order more plurogel: shop.[employer] or call [phone] to place an order for 0.7 oz tube  Use discount code PLUROHEALS (all caps) at checkout in the discount code section to decrease price to $55     EdemaWear should be worn 24/7 unless bathing/showering or changing the dressing.   You will wash and reuse the EdemaWear. DO NOT CUT THE EDEMAWEAR. IF IT IS TOO LONG THEN CUFF THE EDEMAWEAR (the  EdemaWear can shrink length wise with washing).     Whenever you sit raise your ankle above your hips to promote wound healing.     WESLEY Aguirre M.D. November 15, 2022    Call us at [phone] if you have any questions about your wounds, have redness or swelling around your wound, have a fever of 101 or greater or if you have any other problems or concerns. We answer the phone Monday through Friday 8 am to 4 pm, please leave a message as we check the voicemail frequently throughout the day.     If you had a positive experience please indicate that on your patient satisfaction survey form that [facility] will be sending you.    It was a pleasure meeting with you today.  Thank you for allowing me and my team the privilege of caring for you today.  YOU are the reason we are here, and I truly hope we provided you with the excellent service you deserve.  Please let us know if there is anything else we can do for you so that we can be sure you are leaving completely satisfied with your care experience.      If you have any billing related questions please call the [facility] Business office at [phone]. The clinic staff does not handle billing related matters.    If you are scheduled to have a follow up appointment, you will receive a reminder call the day before your visit. On the appointment day please arrive 15 minutes prior to your appointment time. If you are unable to keep that appointment, please call the clinic to cancel or reschedule. If you are more than 10 minutes late or greater for your appointment, the clinic policy is that you may be asked to reschedule.

## 2022-12-01 ENCOUNTER — HOSPITAL ENCOUNTER (OUTPATIENT)
Dept: ULTRASOUND IMAGING | Facility: CLINIC | Age: 87
Discharge: HOME | End: 2022-12-01
Attending: SURGERY
Payer: MEDICAID

## 2022-12-01 DIAGNOSIS — L97.912 ULCER OF RIGHT LOWER EXTREMITY WITH FAT LAYER EXPOSED (H): ICD-10-CM

## 2022-12-01 PROCEDURE — 93926 LOWER EXTREMITY STUDY: CPT | Mod: RT

## 2022-12-01 PROCEDURE — 93922 UPR/L XTREMITY ART 2 LEVELS: CPT

## 2022-12-13 ENCOUNTER — HOSPITAL ENCOUNTER (OUTPATIENT)
Dept: WOUND CARE | Facility: CLINIC | Age: 87
Discharge: HOME | End: 2022-12-13
Attending: EMERGENCY MEDICINE | Admitting: EMERGENCY MEDICINE
Payer: MEDICAID

## 2022-12-13 VITALS
RESPIRATION RATE: 18 BRPM | SYSTOLIC BLOOD PRESSURE: 147 MMHG | HEART RATE: 61 BPM | DIASTOLIC BLOOD PRESSURE: 73 MMHG | TEMPERATURE: 98.4 F

## 2022-12-13 DIAGNOSIS — I73.9 PAD (PERIPHERAL ARTERY DISEASE) (H): ICD-10-CM

## 2022-12-13 DIAGNOSIS — L97.912 ULCER OF RIGHT LOWER EXTREMITY WITH FAT LAYER EXPOSED (H): Primary | ICD-10-CM

## 2022-12-13 DIAGNOSIS — S81.801A OPEN WOUND OF RIGHT LOWER EXTREMITY, INITIAL ENCOUNTER: ICD-10-CM

## 2022-12-13 PROCEDURE — 11042 DBRDMT SUBQ TIS 1ST 20SQCM/<: CPT | Performed by: PHYSICIAN ASSISTANT

## 2022-12-13 NOTE — PROGRESS NOTES
[facility]    ASSESSMENT:   1. (L97.912) Ulcer of right lower extremity with fat layer exposed (H) - improving  2. PAD    PLAN/DISCUSSION:   1. We discussed his arterial US results - he has moderate-severe disease, his wounds are healing so at this point do not recommend intervention. He should wear good shoes and be aware of any new injuries. VAscular disease is often progressive so if bothersome leg pain or new/non-healing wounds would advise consult with vascular service.  2. Continue plurogel, xeroform, EdemaWear  3. See bottom of note for detailed wound care and patient instructions    HISTORY OF PRESENT ILLNESS:   Thierry Samuel is an 87 year old male with COPD, CHF, a fib (not anticoagulated), and HTN who presents today for a RLE ulcer. History is interpreted through his daughter as he speak Gibraltarian. Injured his leg beginning of October. Was seen in the ED on 10/15 for this wound due to concerns of poor healing and infection. Had CT which was negative for deep pathology. Was given 1g rocephin and discharged on PO keflex.  Per daughter he frequent ibumps into things and has not had problems with healing in the past. He utilizes oil on his skin frequently. Has slight edema at the end of the day but has not been able to tolerate compression garments due to sensitive skin and skin tears.     TREATMENT COURSE:  10/18/22: Presents for initial visit at our clinic. Wound culture now growing MRSA and candida, he has not been notified of these results. Started on Hydrofera Blue. Started on appropriate antibiotics.  10/26/22: Added on Plurogel ad EdemaWear due to persistent slough and edema.  11/15/22: Visit with Dr. Hossein Aguirre. Switched to Plurogel and xeroform.   12/1/22: SEREG and arterial US showing moderate-severe arterial disease with R toe pressure of 49.  12/13/22: Returns to clinic. Wounds much improved, new granulation tissue in base. Reports mild claudication with  walking.    VITALS: BP (!) 147/73 (BP Location: Left arm, Patient Position: Sitting, Cuff Size: Adult Regular)   Pulse 61   Temp 98.4  F (36.9  C) (Temporal)   Resp 18      PHYSICAL EXAM:  GENERAL: Patient is alert and oriented and in no acute distress  CV: palpable DP pulse, unable to appreciate PT  INTEGUMENTARY:   Wound (used by OP WHI only) 10/18/22 1426 Right anterior;lower leg laceration (Active)   Thickness/Stage full thickness 12/13/22 1542   Base slough;pink;granulating 12/13/22 1542   Periwound intact;swelling 12/13/22 1542   Periwound Temperature warm 12/13/22 1542   Periwound Skin Turgor soft 12/13/22 1542   Edges open 12/13/22 1542   Length (cm) 1.3 12/13/22 1542   Width (cm) 1 12/13/22 1542   Depth (cm) 0.1 12/13/22 1542   Wound (cm^2) 1.3 cm^2 12/13/22 1542   Wound Volume (cm^3) 0.13 cm^3 12/13/22 1542   Wound healing % 61.31 12/13/22 1542   Drainage Characteristics/Odor serosanguineous;yellow 12/13/22 1542   Drainage Amount moderate 12/13/22 1542   Care, Wound debrided 12/13/22 1542       Wound (used by OP WHI only) 10/18/22 1427 Right anterior;distal;lower leg laceration (Active)   Thickness/Stage full thickness 12/13/22 1542   Base slough;granulating 12/13/22 1542   Periwound intact;swelling 12/13/22 1542   Periwound Temperature warm 12/13/22 1542   Periwound Skin Turgor soft 12/13/22 1542   Edges open 12/13/22 1542   Length (cm) 2.2 12/13/22 1542   Width (cm) 2.4 12/13/22 1542   Depth (cm) 0.3 12/13/22 1542   Wound (cm^2) 5.28 cm^2 12/13/22 1542   Wound Volume (cm^3) 1.58 cm^3 12/13/22 1542   Wound healing % 8.65 12/13/22 1542   Drainage Characteristics/Odor serosanguineous;creamy 12/13/22 1542   Drainage Amount large 12/13/22 1542   Care, Wound debrided 12/13/22 1542         PROCEDURE (both wounds): Nursing staff performed mechanical debridement with dressing removal and cleansing and then applied 4% lidocaine to the wound bed. Patient was determined to be capable of making their own medical  "decisions and informed consent was obtained. Using a 15 blade a surgical debridement was performed down to and including subcutaneous tissue of <20cm2. Hemostasis was achieved with pressure. The patient tolerated the procedure well.      PATIENT INSTRUCTIONS      Further instructions from your care team       Thierry Samuel      1935  A DME order was not completed because supplies were not needed  Medications/supplements to aid in healing:  Vitamin D3 5,000 iu per day  Tereza C 1,000 mg take daily  Vitamin B Complex with folic acid take 1 tablet daily  Vitamin B 12 1000 mcg daily    Wound Dressing Change: Right anterior and anterior distal lower leg  -After cleansing with mild unscented soap and water, apply small amount of VASHE on  gauze, lay into wound bed, let sit for 5-10 minutes, remove gauze (do not rinse) then apply dressing:  -Apply thin layer of Plurogel to wound bed  -Apply 1/10th piece of 5x9 piece of Xeroform to each wound  -Then apply Purple Stripe EdemaWear from toes to knee directly over the Xeroform  Cover with 1/2 5x9 ABD and secure with 1 4\" roll gauze; tape if draining  Change every other day  Put plurogel in the fridge where it becomes thinner.  You will use less of the plurogel this way, extending the use of the tube, and the plurogel will be more soothing.  To Order more plurogel: shop.[employer] or call [phone] to place an order for 0.7 oz tube  Use discount code PLUROHEALS (all caps) at checkout in the discount code section to decrease price to $55  EdemaWear should be worn 24/7 unless bathing/showering or changing the dressing.  You will wash and reuse the EdemaWear. Do not cut, if too long then turn down to cuff.   Whenever you sit raise your ankle above your hips to promote wound healing.     Mayela Love PA-C December 13, 2022    Call us at [phone] if you have any questions about your wounds, have redness or swelling around your wound, have a fever of 101 or " greater or if you have any other problems or concerns. We answer the phone Monday through Friday 8 am to 4 pm, please leave a message as we check the voicemail frequently throughout the day.     If you had a positive experience please indicate that on your patient satisfaction survey form that [facility] will be sending you.  It was a pleasure meeting with you today.  Thank you for allowing me and my team the privilege of caring for you today.  YOU are the reason we are here, and I truly hope we provided you with the excellent service you deserve.  Please let us know if there is anything else we can do for you so that we can be sure you are leaving completely satisfied with your care experience.      If you have any billing related questions please call the [facility] Business office at [phone]. The clinic staff does not handle billing related matters.  If you are scheduled to have a follow up appointment, you will receive a reminder call the day before your visit. On the appointment day please arrive 15 minutes prior to your appointment time. If you are unable to keep that appointment, please call the clinic to cancel or reschedule. If you are more than 10 minutes late or greater for your appointment, the clinic policy is that you may be asked to reschedule.

## 2022-12-13 NOTE — DISCHARGE INSTRUCTIONS
"Thierry Samuel      1935  A DME order was not completed because supplies were not needed  Medications/supplements to aid in healing:  Vitamin D3 5,000 iu per day  Tereza C 1,000 mg take daily  Vitamin B Complex with folic acid take 1 tablet daily  Vitamin B 12 1000 mcg daily    Wound Dressing Change: Right anterior and anterior distal lower leg  -After cleansing with mild unscented soap and water, apply small amount of VASHE on  gauze, lay into wound bed, let sit for 5-10 minutes, remove gauze (do not rinse) then apply dressing:  -Apply thin layer of Plurogel to wound bed  -Apply 1/10th piece of 5x9 piece of Xeroform to each wound  -Then apply Purple Stripe EdemaWear from toes to knee directly over the Xeroform  Cover with 1/2 5x9 ABD and secure with 1 4\" roll gauze; tape if draining  Change every other day  Put plurogel in the fridge where it becomes thinner.  You will use less of the plurogel this way, extending the use of the tube, and the plurogel will be more soothing.  To Order more plurogel: shop.[employer] or call [phone] to place an order for 0.7 oz tube  Use discount code PLUROHEALS (all caps) at checkout in the discount code section to decrease price to $55  EdemaWear should be worn 24/7 unless bathing/showering or changing the dressing.  You will wash and reuse the EdemaWear. Do not cut, if too long then turn down to cuff.   Whenever you sit raise your ankle above your hips to promote wound healing.     Mayela Love PA-C December 13, 2022    Call us at [phone] if you have any questions about your wounds, have redness or swelling around your wound, have a fever of 101 or greater or if you have any other problems or concerns. We answer the phone Monday through Friday 8 am to 4 pm, please leave a message as we check the voicemail frequently throughout the day.     If you had a positive experience please indicate that on your patient satisfaction survey form that [facility] [place] will be sending you.  It was a pleasure meeting with you today.  Thank you for allowing me and my team the privilege of caring for you today.  YOU are the reason we are here, and I truly hope we provided you with the excellent service you deserve.  Please let us know if there is anything else we can do for you so that we can be sure you are leaving completely satisfied with your care experience.      If you have any billing related questions please call the [facility] Business office at [phone]. The clinic staff does not handle billing related matters.  If you are scheduled to have a follow up appointment, you will receive a reminder call the day before your visit. On the appointment day please arrive 15 minutes prior to your appointment time. If you are unable to keep that appointment, please call the clinic to cancel or reschedule. If you are more than 10 minutes late or greater for your appointment, the clinic policy is that you may be asked to reschedule.

## 2023-01-09 ENCOUNTER — TRANSFERRED RECORDS (OUTPATIENT)
Dept: HEALTH INFORMATION MANAGEMENT | Facility: CLINIC | Age: 88
End: 2023-01-09

## 2023-01-10 ENCOUNTER — TRANSFERRED RECORDS (OUTPATIENT)
Dept: HEALTH INFORMATION MANAGEMENT | Facility: CLINIC | Age: 88
End: 2023-01-10
Payer: MEDICAID

## 2023-01-27 ENCOUNTER — HOSPITAL ENCOUNTER (OUTPATIENT)
Dept: WOUND CARE | Facility: CLINIC | Age: 88
Discharge: HOME | End: 2023-01-27
Attending: PHYSICIAN ASSISTANT
Payer: MEDICAID

## 2023-01-27 DIAGNOSIS — L97.912 ULCER OF RIGHT LOWER EXTREMITY WITH FAT LAYER EXPOSED (H): Primary | ICD-10-CM

## 2023-01-27 PROCEDURE — 99213 OFFICE O/P EST LOW 20 MIN: CPT | Performed by: PHYSICIAN ASSISTANT

## 2023-01-27 NOTE — DISCHARGE INSTRUCTIONS
"Thierry Samuel      1935    A DME order not completed, patient has enough supplies    Medications/supplements to aid in healing:  Vitamin D3 5,000 iu per day  Tereza C 1,000 mg take daily  Vitamin B Complex with folic acid take 1 tablet daily  Vitamin B 12 1000 mcg daily    Wound Dressing Change:   Right anterior lower leg  -After cleansing with mild unscented soap and water, apply small amount of VASHE on  gauze, lay into wound bed, let sit for 5-10 minutes, remove gauze (do not rinse) then  apply dressing:  -Apply thin layer of Plurogel to wound bed  -Apply 1/10th piece of 5x9 piece of Xeroform to each wound  -Then apply Purple Stripe EdemaWear from toes to knee directly over the Xeroform  Cover with 1/2 5x9 ABD and secure with 1 4\" roll gauze; tape if draining  Change every other day    Put plurogel in the fridge where it becomes thinner.  You will use less of the plurogel this way, extending the use of the tube, and the  plurogel will be more soothing.   If you out of Plurogel please continue with Xeroform  only    EdemaWear should be worn 24/7 unless bathing/showering or changing the dressing.  You will wash and reuse the EdemaWear. Do not cut, if too long then turn down to cuff.  Whenever you sit raise your ankle above your hips to promote wound healing.    Thank you Wellington for accompany your father to the clinic and doing dressing changes for him      Mayela Love PA-C January 27, 2023    Call us at [phone] if you have any questions about your wounds, have redness or swelling around your wound, have a fever of 101 or greater or if you have any other problems or concerns. We answer the phone Monday through Friday 8 am to 4 pm, please leave a message as we check the voicemail frequently throughout the day.     If you had a positive experience please indicate that on your patient satisfaction survey form that [facility] will be sending you.    It was a pleasure meeting with you " today.  Thank you for allowing me and my team the privilege of caring for you today.  YOU are the reason we are here, and I truly hope we provided you with the excellent service you deserve.  Please let us know if there is anything else we can do for you so that we can be sure you are leaving completely satisfied with your care experience.      If you have any billing related questions please call the [facility] Business office at [phone]. The clinic staff does not handle billing related matters.    If you are scheduled to have a follow up appointment, you will receive a reminder call the day before your visit. On the appointment day please arrive 15 minutes prior to your appointment time. If you are unable to keep that appointment, please call the clinic to cancel or reschedule. If you are more than 10 minutes late or greater for your appointment, the clinic policy is that you may be asked to reschedule.

## 2023-01-27 NOTE — PROGRESS NOTES
[facility]    ASSESSMENT:   1. (L97.912) Ulcer of right lower extremity with fat layer exposed (H) - improving  2. PAD  3. neuropathy    PLAN/DISCUSSION:   1. Discussed toe pain likely neuropathy and circulation related, take care of toes to make sure they aren't rubbing together, can use spacers. Can try OTC analgesics for nerve pain. Follow-up with PCP if worsens or desires more treatment.   2. Continue plurogel, xeroform, EdemaWear until healed   3. See bottom of note for detailed wound care and patient instructions    HISTORY OF PRESENT ILLNESS:   Thierry Samuel is an 87 year old male with COPD, CHF, a fib (not anticoagulated), and HTN who presents today for a RLE ulcer. History is interpreted through his daughter as he speak [place]. Injured his leg beginning of October. Was seen in the ED on 10/15 for this wound due to concerns of poor healing and infection. Had CT which was negative for deep pathology. Was given 1g rocephin and discharged on PO keflex.  Per daughter he frequent ibumps into things and has not had problems with healing in the past. He utilizes oil on his skin frequently. Has slight edema at the end of the day but has not been able to tolerate compression garments due to sensitive skin and skin tears.     TREATMENT COURSE:  10/18/22: Presents for initial visit at our clinic. Wound culture now growing MRSA and candida, he has not been notified of these results. Started on Hydrofera Blue. Started on appropriate antibiotics.  10/26/22: Added on Plurogel ad EdemaWear due to persistent slough and edema.  11/15/22: Visit with Dr. Hossein Aguirre. Switched to Plurogel and xeroform.   12/1/22: SERGE and arterial US showing moderate-severe arterial disease with R toe pressure of 49.  12/13/22: Returns to clinic. Wounds much improved, new granulation tissue in base. Reports mild claudication with walking.We discussed his arterial US results - he has moderate-severe disease, his  wounds are healing so at this point do not recommend intervention. He should wear good shoes and be aware of any new injuries. VAscular disease is often progressive so if bothersome leg pain or new/non-healing wounds would advise consult with vascular service.  01/27/23: Returns to clinic for follow-up. Proximal wound healed, distal wound nearly healed. Very shallow. Has some burning pain between his toes.     VITALS: There were no vitals taken for this visit.     PHYSICAL EXAM:  GENERAL: Patient is alert and oriented and in no acute distress  CV: palpable DP pulse, unable to appreciate PT  INTEGUMENTARY:   Wound (used by OP [facility] only) 10/18/22 1427 Right anterior;distal;lower leg laceration (Active)   Thickness/Stage full thickness 01/27/23 1532   Base granulating;yellow 01/27/23 1532   Periwound intact 01/27/23 1532   Periwound Temperature warm 01/27/23 1532   Periwound Skin Turgor soft 01/27/23 1532   Edges open 01/27/23 1532   Length (cm) 1.3 01/27/23 1532   Width (cm) 1.23 01/27/23 1532   Depth (cm) 0.1 01/27/23 1532   Wound (cm^2) 1.6 cm^2 01/27/23 1532   Wound Volume (cm^3) 0.16 cm^3 01/27/23 1532   Wound healing % 72.32 01/27/23 1532   Drainage Characteristics/Odor serosanguineous;creamy 01/27/23 1532   Drainage Amount large 01/27/23 1532   Care, Wound debrided 12/13/22 1542     MDM: 29 minutes was spent on the day of visit reviewing previous chart notes, assessing the patient and developing the plan of care, this excludes time spent on any procedures.       PATIENT INSTRUCTIONS      Further instructions from your care team       Thierry Samuel      1935    Medications/supplements to aid in healing:  Vitamin D3 5,000 iu per day  Tereza C 1,000 mg take daily  Vitamin B Complex with folic acid take 1 tablet daily  Vitamin B 12 1000 mcg daily    Please call the scheduling  to schedule your SERGE testing and arterial  duplex ultrasound right leg appointment at [facility]  "[place]:  [phone]    Wound Dressing Change: Right anterior and anterior distal lower leg  -After cleansing with mild unscented soap and water, apply small amount of VASHE on  gauze, lay into wound bed, let sit for 5-10 minutes, remove gauze (do not rinse) then  apply dressing:  -Apply thin layer of Plurogel to wound bed  -Apply 1/10th piece of 5x9 piece of Xeroform to each wound  -Then apply Purple Stripe EdemaWear from toes to knee directly over the Xeroform  Cover with 1/2 5x9 ABD and secure with 1 4\" roll gauze; tape if draining  Change every other day    Put plurogel in the fridge where it becomes thinner.  You will use less of the plurogel this way, extending the use of the tube, and the  plurogel will be more soothing.  To Order more plurogel: shop[facility] or call [phone] to place an order  for 0.7 oz tube  Use discount code PLUROHEALS (all caps) at checkout in the discount code section to  decrease price to $55  EdemaWear should be worn 24/7 unless bathing/showering or changing the dressing.  You will wash and reuse the EdemaWear. Do not cut, if too long then turn down to cuff.  Whenever you sit raise your ankle above your hips to promote wound healing.     Mayela Love PA-C January 27, 2023    Call us at [phone] if you have any questions about your wounds, have redness or swelling around your wound, have a fever of 101 or greater or if you have any other problems or concerns. We answer the phone Monday through Friday 8 am to 4 pm, please leave a message as we check the voicemail frequently throughout the day.     If you had a positive experience please indicate that on your patient satisfaction survey form that [facility] will be sending you.    It was a pleasure meeting with you today.  Thank you for allowing me and my team the privilege of caring for you today.  YOU are the reason we are here, and I truly hope we provided you with the excellent service you deserve.  Please " let us know if there is anything else we can do for you so that we can be sure you are leaving completely satisfied with your care experience.      If you have any billing related questions please call the [facility] Business office at [phone]. The clinic staff does not handle billing related matters.    If you are scheduled to have a follow up appointment, you will receive a reminder call the day before your visit. On the appointment day please arrive 15 minutes prior to your appointment time. If you are unable to keep that appointment, please call the clinic to cancel or reschedule. If you are more than 10 minutes late or greater for your appointment, the clinic policy is that you may be asked to reschedule.

## 2023-03-27 NOTE — PROGRESS NOTES
SERVICE DATE: 8/30/2021     Primary Cardiologist: Dr Rod    REASON FOR VISIT:  Thierry Samuel presents for a 2 week follow up on his LE edema after being started on spironolactone. I am meeting him for the first time. His daughter is with and acts as our  per his request.     HISTORY OF PRESENT ILLNESS/ASSESSMENT AND PLAN:  Cardiovascular history includes A. fib on warfarin, s/p pacemaker placement, HTN, HLD and HFpEF-probably mostly right HF/pulmonary HTN. Also has severe COPD on 3 L via nasal cannula as needed, SUSY using CPAP and , history of GI bleed and history chronic left leg wound. Was in to establish care with Dr Rod mid Aug and was noted to have significant LE edema, probably mostly related to right HF. Spironolactone 25mg daily started.    LE edema significantly improved and weight is down. Pt was feeling better with increased energy until he had his PPM check/reprogrammed on 8/25. Since then he has felt fatigued and worsening GUNTER. They have been in contact with the device clinic and will be coming in again tomorrow for programming changes. He also has been having significant cramping in his legs (and hands?) since starting the spironolactone.      1. LE edema, likely multifactorial but mostly related to R HF.   -Significant improvement with spironolactone. BMP stable.  -Unfortunately has developed cramping. Will try stopping his PM dose of furosemide (40mg) and his potassium supplement.   -Continue  furosemide 80mg and spironolactone 25mg daily in the AM  -Call if his weight is going up, taylor 3 lbs in a day or 5lbs in a week    2. S/P PPM  -will see device clinic tomorrow    3. Chronic afib  -On warfarin for AC    4. HFpEF-probably mostly right HF/pulmonary HTN.    5. COPD with oxygen/SUSY    Follow up: with me in 3-4 weeks with a BMP prior.  Of course, the patient was encouraged to contact us sooner with questions or concerns.    42 minutes spent on the date of the encounter  Event Note doing chart review, review of test results, patient visit and documentation     Sarah Lehman PA-C      CURRENT MEDICATIONS:   Current Outpatient Medications   Medication Sig Dispense Refill     ALBUTEROL IN Inhale 6 puffs into the lungs every 8 hours 6 puffs q8h scheduled (Butovent/Salbutamol)       Atorvastatin Calcium (LIPITOR PO) Take 20 mg by mouth every morning        carbamide peroxide (DEBROX) 6.5 % otic solution Place 5 drops into the right ear daily as needed for other (wax) 100 mL 0     Furosemide (LASIX PO) Take 40 mg by mouth every evening       furosemide (LASIX) 80 MG tablet Take 80 mg by mouth every morning       hypromellose-dextran (ARTIFICAL TEARS) 0.1-0.3 % SOLN ophthalmic solution Apply 2-3 drops to eye every hour as needed for dry eyes 30 mL 0     ipratropium - albuterol 0.5 mg/2.5 mg/3 mL (DUONEB) 0.5-2.5 (3) MG/3ML neb solution Take 1 vial by nebulization every 6 hours as needed for shortness of breath / dyspnea or wheezing       montelukast (SINGULAIR) 10 MG tablet Take 10 mg by mouth every evening       omeprazole (PRILOSEC) 20 MG DR capsule Take 20 mg by mouth 2 times daily       POTASSIUM CHLORIDE PO Take 500 mg by mouth daily (medication name is cloruro de potasico 500 mg)       spironolactone (ALDACTONE) 25 MG tablet Take 1 tablet (25 mg) by mouth daily 90 tablet 3     tiotropium (SPIRIVA) 18 MCG inhalation capsule Inhale 18 mcg into the lungs daily       UNABLE TO FIND Inhale 2 puffs into the lungs 2 times daily MEDICATION NAME: Serflu' (salmeterol xinafoate 25 mcg fluticasone propionate 250 mcg)  Inhaler 2 puffs bid.       warfarin ANTICOAGULANT (COUMADIN) 2.5 MG tablet Take by mouth every morning Per 8/10/21: 2.5 mg Monday, 5 mg all other days       losartan (COZAAR) 25 MG tablet Take 25 mg by mouth daily (Patient not taking: Reported on 8/30/2021)       predniSONE (DELTASONE) 20 MG tablet Take 3 tabs by mouth daily x 3 days, then 2 tabs daily x 3 days, then 1 tab daily x 3  days, then 1/2 tab daily x 3 days. (Patient not taking: Reported on 8/30/2021) 20 tablet 0     rifaximin (XIFAXAN) 200 MG tablet Take 400 mg by mouth 2 times daily Rifaxol (Rifaximina) Uses 200mg tabs, 2 tabs bid (4 tabs/day) on first seven days of each month for diverticulosis. (Patient not taking: Reported on 8/30/2021)         ALLERGIES:  No Known Allergies    ROS:  Skin:  Positive for bruising   Eyes:  Positive for visual blurring  ENT:  Positive for hearing loss  Respiratory:  Positive for shortness of breath;dyspnea on exertion;cough  Cardiovascular:    Positive for  Gastroenterology: Negative    Genitourinary:  not assessed    Musculoskeletal:  Positive for back pain  Neurologic:  Positive for numbness or tingling of hands;numbness or tingling of feet  Psychiatric:  Negative    Heme/Lymph/Imm:  Positive for allergies  Endocrine:  Negative      PHYSICAL EXAMINATION:    GENERAL:  The patient is a pleasant 86 year old who appears their stated age.  In no apparent distress.  VITAL SIGNS:  /65   Pulse 76   Ht 1.524 m (5')   Wt 78.5 kg (173 lb)   BMI 33.79 kg/m      RESPIRATORY:  Breathing is nonlabored.  Lungs decreased air movement, scattered crackles throughout  CARDIAC:  RRR  EXTREMITIES:  1+ BLE edema  NEUROLOGIC:  Alert and oriented.    DATA REVIEWED:    Component      Latest Ref Rng & Units 8/25/2021   Sodium      133 - 144 mmol/L 138   Potassium      3.4 - 5.3 mmol/L 4.7   Chloride      94 - 109 mmol/L 104   Carbon Dioxide      20 - 32 mmol/L 30   Anion Gap      3 - 14 mmol/L 4   Urea Nitrogen      7 - 30 mg/dL 22   Creatinine      0.66 - 1.25 mg/dL 0.99   Calcium      8.5 - 10.1 mg/dL 8.9   Glucose      70 - 99 mg/dL 116 (H)   GFR Estimate      >60 mL/min/1.73m2 69

## 2023-12-05 ENCOUNTER — TELEPHONE (OUTPATIENT)
Dept: CARDIOLOGY | Facility: CLINIC | Age: 88
End: 2023-12-05
Payer: MEDICAID

## 2023-12-05 DIAGNOSIS — E78.5 HYPERLIPIDEMIA LDL GOAL <100: ICD-10-CM

## 2023-12-05 DIAGNOSIS — R60.0 BILATERAL LOWER EXTREMITY EDEMA: ICD-10-CM

## 2023-12-05 DIAGNOSIS — I48.20 CHRONIC ATRIAL FIBRILLATION (H): ICD-10-CM

## 2023-12-05 DIAGNOSIS — G47.33 OSA (OBSTRUCTIVE SLEEP APNEA): ICD-10-CM

## 2023-12-05 DIAGNOSIS — I50.30 NYHA CLASS 3 HEART FAILURE WITH PRESERVED EJECTION FRACTION (H): ICD-10-CM

## 2023-12-05 DIAGNOSIS — I50.812 CHRONIC RIGHT-SIDED HEART FAILURE (H): Primary | ICD-10-CM

## 2023-12-05 NOTE — TELEPHONE ENCOUNTER
Daughter requesting to schedule in-clinic appointment, also looks to be due to see Dr Rod.  Ordered entered for MD follow-up and device check scheduled for 12/13/23 in [place].  Offered to set up interpretor for pt's visit, daughter stated that she will be there and that sometimes the interpretor is too fast for the pt.  Encouraged pt's daughter to let us know during the visit if a phone interpretor is needed at any time.    WILLIAM Rajput

## 2023-12-14 ENCOUNTER — ANCILLARY PROCEDURE (OUTPATIENT)
Dept: CARDIOLOGY | Facility: CLINIC | Age: 88
End: 2023-12-14
Attending: INTERNAL MEDICINE
Payer: MEDICAID

## 2023-12-14 DIAGNOSIS — Z95.0 CARDIAC PACEMAKER IN SITU: ICD-10-CM

## 2023-12-14 DIAGNOSIS — I50.812 CHRONIC RIGHT-SIDED HEART FAILURE (H): ICD-10-CM

## 2023-12-14 DIAGNOSIS — I50.812 CHRONIC RIGHT-SIDED HEART FAILURE (H): Primary | ICD-10-CM

## 2023-12-14 PROCEDURE — 93280 PM DEVICE PROGR EVAL DUAL: CPT | Performed by: INTERNAL MEDICINE

## 2023-12-26 LAB
MDC_IDC_MSMT_BATTERY_DTM: NORMAL
MDC_IDC_MSMT_BATTERY_IMPEDANCE: 2894 OHM
MDC_IDC_MSMT_BATTERY_REMAINING_LONGEVITY: 28 MO
MDC_IDC_MSMT_BATTERY_STATUS: NORMAL
MDC_IDC_MSMT_BATTERY_VOLTAGE: 2.73 V
MDC_IDC_MSMT_LEADCHNL_RA_IMPEDANCE_VALUE: 67 OHM
MDC_IDC_MSMT_LEADCHNL_RV_IMPEDANCE_VALUE: 673 OHM
MDC_IDC_MSMT_LEADCHNL_RV_PACING_THRESHOLD_AMPLITUDE: 0.5 V
MDC_IDC_MSMT_LEADCHNL_RV_PACING_THRESHOLD_PULSEWIDTH: 0.4 MS
MDC_IDC_PG_IMPLANT_DTM: NORMAL
MDC_IDC_PG_MFG: NORMAL
MDC_IDC_PG_MODEL: NORMAL
MDC_IDC_PG_SERIAL: NORMAL
MDC_IDC_PG_TYPE: NORMAL
MDC_IDC_SESS_CLINIC_NAME: NORMAL
MDC_IDC_SESS_DTM: NORMAL
MDC_IDC_SESS_TYPE: NORMAL
MDC_IDC_SET_BRADY_HYSTRATE: NORMAL
MDC_IDC_SET_BRADY_LOWRATE: 60 {BEATS}/MIN
MDC_IDC_SET_BRADY_MAX_TRACKING_RATE: 105 {BEATS}/MIN
MDC_IDC_SET_BRADY_MODE: NORMAL
MDC_IDC_SET_LEADCHNL_RV_PACING_AMPLITUDE: 1.5 V
MDC_IDC_SET_LEADCHNL_RV_PACING_CAPTURE_MODE: NORMAL
MDC_IDC_SET_LEADCHNL_RV_PACING_POLARITY: NORMAL
MDC_IDC_SET_LEADCHNL_RV_PACING_PULSEWIDTH: 0.4 MS
MDC_IDC_SET_LEADCHNL_RV_SENSING_POLARITY: NORMAL
MDC_IDC_SET_LEADCHNL_RV_SENSING_SENSITIVITY: 2 MV
MDC_IDC_SET_ZONE_DETECTION_INTERVAL: 333.33 MS
MDC_IDC_SET_ZONE_STATUS: NORMAL
MDC_IDC_SET_ZONE_STATUS: NORMAL
MDC_IDC_SET_ZONE_TYPE: NORMAL
MDC_IDC_SET_ZONE_TYPE: NORMAL
MDC_IDC_SET_ZONE_VENDOR_TYPE: NORMAL
MDC_IDC_SET_ZONE_VENDOR_TYPE: NORMAL
MDC_IDC_STAT_AT_BURDEN_PERCENT: 0 %
MDC_IDC_STAT_AT_DTM_END: NORMAL
MDC_IDC_STAT_AT_DTM_START: NORMAL
MDC_IDC_STAT_BRADY_DTM_END: NORMAL
MDC_IDC_STAT_BRADY_DTM_START: NORMAL
MDC_IDC_STAT_BRADY_RV_PERCENT_PACED: 53 %
MDC_IDC_STAT_EPISODE_RECENT_COUNT: 0
MDC_IDC_STAT_EPISODE_RECENT_COUNT: 1
MDC_IDC_STAT_EPISODE_RECENT_COUNT_DTM_END: NORMAL
MDC_IDC_STAT_EPISODE_RECENT_COUNT_DTM_END: NORMAL
MDC_IDC_STAT_EPISODE_RECENT_COUNT_DTM_START: NORMAL
MDC_IDC_STAT_EPISODE_RECENT_COUNT_DTM_START: NORMAL
MDC_IDC_STAT_EPISODE_TYPE: NORMAL
MDC_IDC_STAT_EPISODE_TYPE: NORMAL

## 2024-03-15 ENCOUNTER — TELEPHONE (OUTPATIENT)
Dept: WOUND CARE | Facility: CLINIC | Age: 89
End: 2024-03-15
Payer: MEDICAID

## 2024-03-15 NOTE — TELEPHONE ENCOUNTER
"Call received from patient daughter, [place], [phone], seeking an appointment for an \"open wound, right foot, toe two - has pain and dressing with gauze. Please review.  Osbaldo Gilliland LPN on 3/15/2024 at 8:45 AM    "

## 2024-03-15 NOTE — TELEPHONE ENCOUNTER
Consult received via Phone, self referral, for wound of the right toe 2    Please schedule with Mayela Love PA-C, Ashlyn Goel NP, or Alber Rm M.D. at [facility] [place] for next available appointment.  (Saw Steffanie a little over a year ago)    **For all providers, Steffanie Barone PA-C, Dr. Manuel, Ashlyn Goel NP or Dr. Roldan, please schedule a follow up 2-3 weeks after initial appointment.**  --If unable to schedule within 2-3 weeks then please place on cancellation list--    Is the patient able to make their own medical decisions? Yes    Can the patient be scheduled on a Thursday? Yes    Is patient a EMELY lift? PLEASE INQUIRE WHEN MAKING THE APPOINTMENT AND PUT IN APPOINTMENT NOTES    Routing to  Wound Healing Scheduling.

## 2024-03-22 ENCOUNTER — HOSPITAL ENCOUNTER (OUTPATIENT)
Dept: WOUND CARE | Facility: CLINIC | Age: 89
Discharge: HOME | End: 2024-03-22
Payer: MEDICAID

## 2024-03-22 VITALS — TEMPERATURE: 96.8 F | SYSTOLIC BLOOD PRESSURE: 114 MMHG | HEART RATE: 60 BPM | DIASTOLIC BLOOD PRESSURE: 59 MMHG

## 2024-03-22 DIAGNOSIS — L97.422 CHRONIC HEEL ULCER, LEFT, WITH FAT LAYER EXPOSED (H): ICD-10-CM

## 2024-03-22 DIAGNOSIS — L97.512 RIGHT SECOND TOE ULCER, WITH FAT LAYER EXPOSED (H): Primary | ICD-10-CM

## 2024-03-22 PROCEDURE — 99203 OFFICE O/P NEW LOW 30 MIN: CPT

## 2024-03-22 PROCEDURE — G0463 HOSPITAL OUTPT CLINIC VISIT: HCPCS | Mod: 25

## 2024-03-22 PROCEDURE — 97602 WOUND(S) CARE NON-SELECTIVE: CPT

## 2024-03-22 NOTE — DISCHARGE INSTRUCTIONS
"Thierry Samuel   1935    A DME order for supplies has been placed to [employer]. If there are any issues with your order including not receiving the order please call [employer] at [phone]. They can also provide a tracking number for you.    Dressing changes outside of clinic are being performed by Family (nikky Pinto)    Plan:  - Podiatry consult. Please call [phone] to set up appointment.  - Talk to PCP about gabapentin, which can help with neuropathic pain.    Wound Dressing Change: left lateral heel  - Wash your hands with soap and water before you begin your dressing change and prepare a clean surface for dressings.  - Cleanse with mild unscented soap and water (such as Cetaphil, Cerave or Dove)   - Apply thick, high-quality moisturizer to intact skin (such as CeraVe, Eucerin, Aquaphor or Vanicream)   - Primary dressing: Apply Mepilex 3x3 border  - Apply toe separator with adhesive backing on bilateral toes- (purchase at [email or web address])  - Apply purple stripe Edemawear from toes to knee  Change every other day    Wound Dressing Change: right medial toe 2  - Wash your hands with soap and water before you begin your dressing change and prepare a clean surface for dressings.  - Cleanse with mild unscented soap and water (such as Cetaphil, Cerave or Dove)   - Apply thick, high-quality moisturizer to intact skin (such as CeraVe, Eucerin, Aquaphor or Vanicream)   - Primary dressing: pea-sized amount Iodosorb gel  - Secondary dressing: one 2x2 gauze and Medipore + Pad 2 x 2 3/8\" or [place] dressing or similar  - Apply toe separator with adhesive backing on bilateral toes- (purchase at [email or web address])  - Apply purple stripe Edemawear from toes to knee  Change every other day    Compression:   EdemaWear should be worn 24/7 unless bathing/showering or changing the dressing. You will wash and reuse the EdemaWear. DO NOT CUT THE EDEMAWEAR. IF IT IS TOO LONG THEN CUFF " THE EDEMAWEAR (the EdemaWear can shrink length wise with washing).   *Compression is recommended lifelong. It will help healing of current wounds and prevent future cellulitis and wounds.     Walk/exercise/foot pumps as much as you can, as you are able.     Elevate: your legs as much as possible. In addition, elevate legs above the level of your heart for 20-30 minutes: 2-3 times each day   Ways to do this:   - Lay on the couch or your bed and prop your legs up on pillows   - Recline back as far as you can go in your recliner and prop your legs on pillows.     Low sodium, high protein diet:  A diet high in protein is important for wound healing, we recommend getting up to 90 grams of protein per day. Taking protein shakes or bars are a good way to get extra protein in your diet.   Good sources of protein:  Pork 26g per 3 oz  Whey protein powder - 24g per scoop (on average)  Greek yogurt - 23g per 8oz   Chicken or Turkey - 23g per 3oz  Fish - 20-25g per 3oz  Beef - 18-23g per 3oz  Navy beans - 20g per cup  Cottage cheese - 14g per 1/2 cup   Lentils - 13g per 1/4 cup  Beef jerky 13g per 1oz  2% milk - 8g per cup  Peanut butter - 8g per 2 tablespoons  Eggs - 6g per egg  Mixed nuts - 6g per 2oz          Main Provider: Ashlyn Goel NP March 22, 2024    Call us at [phone] if you have any questions about your wounds, if you have redness or swelling around your wound, have a fever of 101 degrees Fahrenheit or greater or if you have any other problems or concerns. We answer the phone Monday through Friday 8 am to 4 pm, please leave a message as we check the voicemail frequently throughout the day. If you have a concern over the weekend, please leave a message and we will return your call Monday. If the need is urgent, go to the ER or urgent care.    If you had a positive experience please indicate that on your patient satisfaction survey form that [facility] will be sending you.    It was a pleasure meeting with  you today.  Thank you for allowing me and my team the privilege of caring for you today.  YOU are the reason we are here, and I truly hope we provided you with the excellent service you deserve.  Please let us know if there is anything else we can do for you so that we can be sure you are leaving completely satisfied with your care experience.      If you have any billing related questions please call the [facility] Business office at [phone]. The clinic staff does not handle billing related matters.    If you are scheduled to have a follow up appointment, you will receive a reminder call the day before your visit. On the appointment day please arrive 15 minutes prior to your appointment time. If you are unable to keep that appointment, please call the clinic to cancel or reschedule. If you are more than 10 minutes late or greater for your scheduled appointment time, the clinic policy is that you may be asked to reschedule.

## 2024-03-22 NOTE — PROGRESS NOTES
[facility]    ASSESSMENT:   Right second toe ulcer, with fat layer exposed (H)    2. (L97.422) Chronic heel ulcer, left, with fat layer exposed (H)      PLAN/DISCUSSION:   Discussed toe and bilateral leg and arm pain is likely neuropathy, would follow up with PCP for treatments.  Toes continue to rub together, discussed spacers and referral to podiatry   Wound care plan: Iodosorb to the toe wound, with Mepilex to the left lateral wound. Dressing will be performed by family/friend/caregiver See bottom of note for detailed wound care and patient instructions  Dietary recommendations discussed, see AVS   Discussed importance of compression and elevation.     HISTORY OF PRESENT ILLNESS:   Thierry Samuel is a 89 year old male who has a right second toe wound and left heel wound that was noted over the past few weeks. Hx of COPD, CHF, a fib(not anticoagulated) and hypertension. Reports a high degree of pain on the wounds, but also states he has hypersensitivity and pain with light touch from his bilateral knees down that has been ongoing for a year.     Patient Active Problem List   Diagnosis    COPD with exacerbation (H)    CHF (congestive heart failure) (H)    COPD exacerbation (H)    Contusion of face, scalp and neck, initial encounter    Acute on chronic respiratory failure with hypoxia (H)    Muscle pain    Weakness generalized    Dyspnea, unspecified type    Chronic atrial fibrillation (H)    Essential hypertension    Rectal bleeding    Ulcer of right lower extremity with fat layer exposed (H)    PAD (peripheral artery disease) (H24)    Right second toe ulcer, with fat layer exposed (H)    Chronic heel ulcer, left, with fat layer exposed (H)       MEDICATIONS:   Current Outpatient Medications   Medication    albuterol (PROAIR HFA/PROVENTIL HFA/VENTOLIN HFA) 108 (90 Base) MCG/ACT inhaler    atorvastatin (LIPITOR) 10 MG tablet    ferrous gluconate (FERGON) 324 (38 Fe) MG tablet     fluticasone-salmeterol (ADVAIR) 500-50 MCG/ACT inhaler    hypromellose-dextran (ARTIFICAL TEARS) 0.1-0.3 % SOLN ophthalmic solution    ipratropium - albuterol 0.5 mg/2.5 mg/3 mL (DUONEB) 0.5-2.5 (3) MG/3ML neb solution    montelukast (SINGULAIR) 10 MG tablet    nystatin (MYCOSTATIN) 937643 UNIT/ML suspension    omeprazole (PRILOSEC) 20 MG DR capsule    POTASSIUM CHLORIDE PO    prednisoLONE 5 MG tablet    spironolactone (ALDACTONE) 25 MG tablet    tiotropium (SPIRIVA) 18 MCG inhalation capsule    torsemide (DEMADEX) 20 MG tablet     No current facility-administered medications for this encounter.       VITALS: /59 (BP Location: Left arm, Patient Position: Sitting)   Pulse 60   Temp 96.8  F (36  C) (Temporal)      PHYSICAL EXAM:  GENERAL: Patient is alert and oriented and in no acute distress  CV: pedal pulses palpable  INTEGUMENTARY:   Wound (used by OP [facility] only) 10/18/22 1427 Right anterior;distal;lower leg laceration (Active)       Wound (used by OP [facility] only) 03/22/24 0924 Right medial 2 toe neuropathic ulcer (Active)   Thickness/Stage full thickness 03/22/24 0900   Base pink 03/22/24 0900   Periwound redness 03/22/24 0900   Periwound Temperature warm 03/22/24 0900   Periwound Skin Turgor soft 03/22/24 0900   Edges open 03/22/24 0900   Length (cm) 0.3 03/22/24 0900   Width (cm) 0.4 03/22/24 0900   Depth (cm) 0.1 03/22/24 0900   Wound (cm^2) 0.12 cm^2 03/22/24 0900   Wound Volume (cm^3) 0.01 cm^3 03/22/24 0900   Drainage Characteristics/Odor serous 03/22/24 0900   Drainage Amount small 03/22/24 0900   Care, Wound non-select wound debridement performed. 03/22/24 0900       Wound (used by OP I only) 03/22/24 0925 Left lateral heel fissure (Active)   Thickness/Stage full thickness 03/22/24 0900   Base pink 03/22/24 0900   Periwound dry 03/22/24 0900   Periwound Temperature warm 03/22/24 0900   Periwound Skin Turgor soft 03/22/24 0900   Edges open 03/22/24 0900   Length (cm) 1 03/22/24 0900   Width (cm) 0.2  03/22/24 0900   Depth (cm) 0.1 03/22/24 0900   Wound (cm^2) 0.2 cm^2 03/22/24 0900   Wound Volume (cm^3) 0.02 cm^3 03/22/24 0900   Drainage Characteristics/Odor serosanguineous 03/22/24 0900   Drainage Amount moderate 03/22/24 0900   Care, Wound non-select wound debridement performed. 03/22/24 0900                     MDM: 30 minutes were spent on the date of the visit reviewing previous chart notes, evaluating patient and developing the treatment plan, this excludes any time spent on procedures    PATIENT INSTRUCTIONS      Further instructions from your care team         Thierrychristian Cuencaierdo   1935    A DME order for supplies has been placed to [employer]. If there are any issues with your order including not receiving the order please call [employer] at [phone]. They can also provide a tracking number for you.    Dressing changes outside of clinic are being performed by Family (nikky Pinto)    Plan:  - Podiatry consult. Please call [phone] to set up appointment.  - Talk to PCP about gabapentin, which can help with neuropathic pain.    Wound Dressing Change: left lateral heel  - Wash your hands with soap and water before you begin your dressing change and prepare a clean surface for dressings.  - Cleanse with mild unscented soap and water (such as Cetaphil, Cerave or Dove)   - Apply thick, high-quality moisturizer to intact skin (such as CeraVe, Eucerin, Aquaphor or Vanicream)   - Primary dressing: Apply Mepilex 3x3 border  - Apply toe separator with adhesive backing on bilateral toes- (purchase at [employer])  - Apply purple stripe Edemawear from toes to knee  Change every other day    Wound Dressing Change: right medial toe 2  - Wash your hands with soap and water before you begin your dressing change and prepare a clean surface for dressings.  - Cleanse with mild unscented soap and water (such as Cetaphil, Cerave or Dove)   - Apply thick, high-quality moisturizer to intact  "skin (such as CeraVe, Eucerin, Aquaphor or Vanicream)   - Primary dressing: pea-sized amount Iodosorb gel  - Secondary dressing: one 2x2 gauze and Medipore + Pad 2 x 2 3/8\" or [place] dressing or similar  - Apply toe separator with adhesive backing on bilateral toes- (purchase at [employer])  - Apply purple stripe Edemawear from toes to knee  Change every other day    Compression:   EdemaWear should be worn 24/7 unless bathing/showering or changing the dressing. You will wash and reuse the EdemaWear. DO NOT CUT THE EDEMAWEAR. IF IT IS TOO LONG THEN CUFF THE EDEMAWEAR (the EdemaWear can shrink length wise with washing).   *Compression is recommended lifelong. It will help healing of current wounds and prevent future cellulitis and wounds.     Walk/exercise/foot pumps as much as you can, as you are able.     Elevate: your legs as much as possible. In addition, elevate legs above the level of your heart for 20-30 minutes: 2-3 times each day   Ways to do this:   - Lay on the couch or your bed and prop your legs up on pillows   - Recline back as far as you can go in your recliner and prop your legs on pillows.     Low sodium, high protein diet:  A diet high in protein is important for wound healing, we recommend getting up to 90 grams of protein per day. Taking protein shakes or bars are a good way to get extra protein in your diet.   Good sources of protein:  Pork 26g per 3 oz  Whey protein powder - 24g per scoop (on average)  Greek yogurt - 23g per 8oz   Chicken or Turkey - 23g per 3oz  Fish - 20-25g per 3oz  Beef - 18-23g per 3oz  Navy beans - 20g per cup  Cottage cheese - 14g per 1/2 cup   Lentils - 13g per 1/4 cup  Beef jerky 13g per 1oz  2% milk - 8g per cup  Peanut butter - 8g per 2 tablespoons  Eggs - 6g per egg  Mixed nuts - 6g per 2oz          Main Provider: Ashlyn Goel NP March 22, 2024    Call us at [phone] if you have any questions about your wounds, if you have redness or swelling " around your wound, have a fever of 101 degrees Fahrenheit or greater or if you have any other problems or concerns. We answer the phone Monday through Friday 8 am to 4 pm, please leave a message as we check the voicemail frequently throughout the day. If you have a concern over the weekend, please leave a message and we will return your call Monday. If the need is urgent, go to the ER or urgent care.    If you had a positive experience please indicate that on your patient satisfaction survey form that [facility] will be sending you.    It was a pleasure meeting with you today.  Thank you for allowing me and my team the privilege of caring for you today.  YOU are the reason we are here, and I truly hope we provided you with the excellent service you deserve.  Please let us know if there is anything else we can do for you so that we can be sure you are leaving completely satisfied with your care experience.      If you have any billing related questions please call the [facility] Business office at [phone]. The clinic staff does not handle billing related matters.    If you are scheduled to have a follow up appointment, you will receive a reminder call the day before your visit. On the appointment day please arrive 15 minutes prior to your appointment time. If you are unable to keep that appointment, please call the clinic to cancel or reschedule. If you are more than 10 minutes late or greater for your scheduled appointment time, the clinic policy is that you may be asked to reschedule.          Electronically signed by Ashlyn Goel CNP on March 22, 2024

## 2024-03-26 ENCOUNTER — OFFICE VISIT (OUTPATIENT)
Dept: PODIATRY | Facility: CLINIC | Age: 89
End: 2024-03-26
Payer: MEDICAID

## 2024-03-26 VITALS — BODY MASS INDEX: 30.08 KG/M2 | SYSTOLIC BLOOD PRESSURE: 112 MMHG | HEIGHT: 60 IN | DIASTOLIC BLOOD PRESSURE: 52 MMHG

## 2024-03-26 DIAGNOSIS — M20.61 TOE DEFORMITY, RIGHT: ICD-10-CM

## 2024-03-26 DIAGNOSIS — L97.512 RIGHT SECOND TOE ULCER, WITH FAT LAYER EXPOSED (H): Primary | ICD-10-CM

## 2024-03-26 DIAGNOSIS — L97.422 CHRONIC HEEL ULCER, LEFT, WITH FAT LAYER EXPOSED (H): ICD-10-CM

## 2024-03-26 PROCEDURE — 99243 OFF/OP CNSLTJ NEW/EST LOW 30: CPT | Performed by: PODIATRIST

## 2024-03-26 NOTE — LETTER
3/26/2024         RE: Thierry Samuel  [address] [address]        Dear Colleague,    Thank you for referring your patient, Thierry Samuel, to the [facility]. Please see a copy of my visit note below.    ASSESSMENT:  Encounter Diagnoses   Name Primary?     Right second toe ulcer, with fat layer exposed (H) Yes     Chronic heel ulcer, left, with fat layer exposed (H)      Toe deformity, right      MEDICAL DECISION MAKING:  His main focus of pain is the right second toe and this is related to a distal ulceration secondary to digital deformities and friction in this region.    Given his age, concern for peripheral arterial disease, and associated hallux abduction, there is not a reconstructive option from a surgical standpoint.    The procedure of choice, should the wound not heal and pain persist, is amputation of the toe.  Noninvasive vascular studies will be needed first to ensure he has adequate circulation for healing.    We all agree that avoiding surgery is best.    Recommendations:  Daily cleansing of the wound, blot dry, topical antibiotic, light dressing and monitor for clinical signs of infection.  Continue the use of toe spacers.  I showed spacing more proximally can help separate the toes.    There is no indication for any excisional debridement.    Follow-up in 1 month    Disclaimer: This note consists of symbols derived from keyboarding, dictation and/or voice recognition software. As a result, there may be errors in the script that have gone undetected. Please consider this when interpreting information found in this chart.    Osbaldo Hicks DPM, FACFAS, MS    [place] Department of Podiatry/Foot & Ankle Surgery      ____________________________________________________________________    HPI:       I was asked by Ashlyn Goel NP to evaluate Thierry Samuel in consultation for bilateral foot ulcer.     Thierry was previously  treated at the wound clinic.  He is referred to podiatry for input.    His greatest concern source of pain is the right second toe.  He did not tolerate the Iodosorb ointment.  It caused burning discomfort.  They have applied a lotion.  Uses bilateral toe spacers.  He is accompanied by his daughter today.  She reports known peripheral arterial disease.  She provides interpretation.  Thierry uses a stationary bike for exercise.  *  Past Medical History:   Diagnosis Date     A-fib (H)     s/p pacemaker     Cardiac pacemaker in situ      CHF (congestive heart failure) (H) 09/27/2018     COPD (chronic obstructive pulmonary disease) (H)      GI bleed      Hypertension      Long term current use of anticoagulant therapy      Moderate to severe pulmonary hypertension (H)      SUSY (obstructive sleep apnea)      SUSY (obstructive sleep apnea)    *  *  Past Surgical History:   Procedure Laterality Date     APPENDECTOMY       CARDIAC SURGERY     *  *  Current Outpatient Medications   Medication Sig Dispense Refill     albuterol (PROAIR HFA/PROVENTIL HFA/VENTOLIN HFA) 108 (90 Base) MCG/ACT inhaler Inhale 2 puffs into the lungs every 6 hours as needed for shortness of breath / dyspnea or wheezing       atorvastatin (LIPITOR) 10 MG tablet Take 10 mg by mouth daily        ferrous gluconate (FERGON) 324 (38 Fe) MG tablet Take 1 tablet (324 mg) by mouth daily (with breakfast) 90 tablet 1     fluticasone-salmeterol (ADVAIR) 500-50 MCG/ACT inhaler Inhale 1 puff into the lungs every 12 hours       hypromellose-dextran (ARTIFICAL TEARS) 0.1-0.3 % SOLN ophthalmic solution Apply 2-3 drops to eye every hour as needed for dry eyes 30 mL 0     ipratropium - albuterol 0.5 mg/2.5 mg/3 mL (DUONEB) 0.5-2.5 (3) MG/3ML neb solution Take 1 vial by nebulization every 6 hours as needed for shortness of breath / dyspnea or wheezing       nystatin (MYCOSTATIN) 374700 UNIT/ML suspension RINSE WITH 5 ML 4-5 TIMES PER DAY AND SWALLOW, PLACE DENTURE IN SMALL  BOWL WITH DENTURE SUBMERGED IN MOUTHRINSE       omeprazole (PRILOSEC) 20 MG DR capsule Take 20 mg by mouth 2 times daily       POTASSIUM CHLORIDE PO Take by mouth daily as needed Dosage unknown - takes PRN when he's having leg cramps       prednisoLONE 5 MG tablet Take 1 tablet (5 mg) by mouth every other day, alternate days take 0.5 tablets (2.5 mg) by mouth.       spironolactone (ALDACTONE) 25 MG tablet Take 1 tablet (25 mg) by mouth daily 90 tablet 3     tiotropium (SPIRIVA) 18 MCG inhalation capsule Inhale 18 mcg into the lungs daily       torsemide (DEMADEX) 20 MG tablet Take 2 tablets (40 mg) by mouth daily 180 tablet 1     montelukast (SINGULAIR) 10 MG tablet Take 10 mg by mouth every evening (Patient not taking: Reported on 3/26/2024)           EXAM:    Vitals: /52   Ht 1.524 m (5')   BMI 30.08 kg/m    BMI: Body mass index is 30.08 kg/m .    Constitutional:  Thierry Samuel is in no apparent distress, appears well-nourished.  Cooperative with history and physical exam.    Vascular:  Pedal pulses are none palpable for both the DP and PT arteries, bilateral foot.  CFT < 3 sec.  No edema.      Neuro: Light touch sensation is intact to the L4, L5, S1 distributions  No evidence of weakness, spasticity, or contracture in the lower extremities.     Derm: There is ulceration at the distal medial aspect of the right second toe.  This appears to be the region where the toe contacts the hallux.  This measures 0.4 cm in diameter and is limited to the depth of deeper skin.  No clinical signs of infection.    Posterior left heel skin fissure.  Stable.    Musculoskeletal:    Flatfoot structure.  Bilateral hallux abduction.  Fairly rigid.  Right second toe is drifting dorsally and contacts the hallux distally.        Again, thank you for allowing me to participate in the care of your patient.        Sincerely,        Osbaldo Hicks DPM

## 2024-03-26 NOTE — PROGRESS NOTES
ASSESSMENT:  Encounter Diagnoses   Name Primary?    Right second toe ulcer, with fat layer exposed (H) Yes    Chronic heel ulcer, left, with fat layer exposed (H)     Toe deformity, right      MEDICAL DECISION MAKING:  His main focus of pain is the right second toe and this is related to a distal ulceration secondary to digital deformities and friction in this region.    Given his age, concern for peripheral arterial disease, and associated hallux abduction, there is not a reconstructive option from a surgical standpoint.    The procedure of choice, should the wound not heal and pain persist, is amputation of the toe.  Noninvasive vascular studies will be needed first to ensure he has adequate circulation for healing.    We all agree that avoiding surgery is best.    Recommendations:  Daily cleansing of the wound, blot dry, topical antibiotic, light dressing and monitor for clinical signs of infection.  Continue the use of toe spacers.  I showed spacing more proximally can help separate the toes.    There is no indication for any excisional debridement.    Follow-up in 1 month    Disclaimer: This note consists of symbols derived from keyboarding, dictation and/or voice recognition software. As a result, there may be errors in the script that have gone undetected. Please consider this when interpreting information found in this chart.    Osbaldo Hicks DPM, FACFAS, MS    [place] Department of Podiatry/Foot & Ankle Surgery      ____________________________________________________________________    HPI:       I was asked by Ashlyn Goel NP to evaluate Thierry Samuel in consultation for bilateral foot ulcer.     Thierry was previously treated at the wound clinic.  He is referred to podiatry for input.    His greatest concern source of pain is the right second toe.  He did not tolerate the Iodosorb ointment.  It caused burning discomfort.  They have applied a lotion.  Uses bilateral toe spacers.  He is  accompanied by his daughter today.  She reports known peripheral arterial disease.  She provides interpretation.  Thierry uses a stationary bike for exercise.  *  Past Medical History:   Diagnosis Date    A-fib (H)     s/p pacemaker    Cardiac pacemaker in situ     CHF (congestive heart failure) (H) 09/27/2018    COPD (chronic obstructive pulmonary disease) (H)     GI bleed     Hypertension     Long term current use of anticoagulant therapy     Moderate to severe pulmonary hypertension (H)     SUSY (obstructive sleep apnea)     SUSY (obstructive sleep apnea)    *  *  Past Surgical History:   Procedure Laterality Date    APPENDECTOMY      CARDIAC SURGERY     *  *  Current Outpatient Medications   Medication Sig Dispense Refill    albuterol (PROAIR HFA/PROVENTIL HFA/VENTOLIN HFA) 108 (90 Base) MCG/ACT inhaler Inhale 2 puffs into the lungs every 6 hours as needed for shortness of breath / dyspnea or wheezing      atorvastatin (LIPITOR) 10 MG tablet Take 10 mg by mouth daily       ferrous gluconate (FERGON) 324 (38 Fe) MG tablet Take 1 tablet (324 mg) by mouth daily (with breakfast) 90 tablet 1    fluticasone-salmeterol (ADVAIR) 500-50 MCG/ACT inhaler Inhale 1 puff into the lungs every 12 hours      hypromellose-dextran (ARTIFICAL TEARS) 0.1-0.3 % SOLN ophthalmic solution Apply 2-3 drops to eye every hour as needed for dry eyes 30 mL 0    ipratropium - albuterol 0.5 mg/2.5 mg/3 mL (DUONEB) 0.5-2.5 (3) MG/3ML neb solution Take 1 vial by nebulization every 6 hours as needed for shortness of breath / dyspnea or wheezing      nystatin (MYCOSTATIN) 864832 UNIT/ML suspension RINSE WITH 5 ML 4-5 TIMES PER DAY AND SWALLOW, PLACE DENTURE IN SMALL BOWL WITH DENTURE SUBMERGED IN MOUTHRINSE      omeprazole (PRILOSEC) 20 MG DR capsule Take 20 mg by mouth 2 times daily      POTASSIUM CHLORIDE PO Take by mouth daily as needed Dosage unknown - takes PRN when he's having leg cramps      prednisoLONE 5 MG tablet Take 1 tablet (5 mg) by  mouth every other day, alternate days take 0.5 tablets (2.5 mg) by mouth.      spironolactone (ALDACTONE) 25 MG tablet Take 1 tablet (25 mg) by mouth daily 90 tablet 3    tiotropium (SPIRIVA) 18 MCG inhalation capsule Inhale 18 mcg into the lungs daily      torsemide (DEMADEX) 20 MG tablet Take 2 tablets (40 mg) by mouth daily 180 tablet 1    montelukast (SINGULAIR) 10 MG tablet Take 10 mg by mouth every evening (Patient not taking: Reported on 3/26/2024)           EXAM:    Vitals: /52   Ht 1.524 m (5')   BMI 30.08 kg/m    BMI: Body mass index is 30.08 kg/m .    Constitutional:  Thierry Samuel is in no apparent distress, appears well-nourished.  Cooperative with history and physical exam.    Vascular:  Pedal pulses are none palpable for both the DP and PT arteries, bilateral foot.  CFT < 3 sec.  No edema.      Neuro: Light touch sensation is intact to the L4, L5, S1 distributions  No evidence of weakness, spasticity, or contracture in the lower extremities.     Derm: There is ulceration at the distal medial aspect of the right second toe.  This appears to be the region where the toe contacts the hallux.  This measures 0.4 cm in diameter and is limited to the depth of deeper skin.  No clinical signs of infection.    Posterior left heel skin fissure.  Stable.    Musculoskeletal:    Flatfoot structure.  Bilateral hallux abduction.  Fairly rigid.  Right second toe is drifting dorsally and contacts the hallux distally.

## 2024-03-26 NOTE — PATIENT INSTRUCTIONS
Thank you for choosing [facility]!    DR. WILLAMS'S CLINIC LOCATIONS:     [facility] TRIAGE LINE: [phone]   [address] APPOINTMENTS: [phone]   [place] [address] RADIOLOGY: [phone]   (Every other Tues - Wed - Fri PM) SET UP SURGERY: [phone]    PHYSICAL THERAPY: [phone]   [place] SPECIAL[facility] BILLING QUESTIONS: [phone] [id number] FAX: [phone]   [place] [address]    (Thurs & Fri AM)       Wound Care Recommendations:    1)  Keep the wound covered by a bandage when bathing.    2)  Gently clean the wound with soap water, separate from bath/shower water.      3)  Each day, apply a topical antibiotic ointment to the wound (Neosporin, Triple antibiotic, Bacitracin).   Cover with large band-aid or gauze.      5)  Please seek immediate medical attention if any increasing redness, drainage, smell, or pain related to the wound.     6)  Please return to clinic in the period of time requested by Dr. Willams.

## 2024-08-02 ENCOUNTER — DOCUMENTATION ONLY (OUTPATIENT)
Dept: CARDIOLOGY | Facility: CLINIC | Age: 89
End: 2024-08-02
Payer: MEDICAID

## 2024-08-02 NOTE — PROGRESS NOTES
Received request from [employer], Carol, to fax most recent device check to [facility] at [phone].  Most recent device check report and all attachments faxed to [facility].      WILLIAM Lund

## 2024-08-27 ENCOUNTER — TRANSCRIBE ORDERS (OUTPATIENT)
Dept: OTHER | Age: 89
End: 2024-08-27

## 2024-08-27 DIAGNOSIS — R05.3 CHRONIC COUGH: ICD-10-CM

## 2024-08-27 DIAGNOSIS — J44.9 STAGE 2 MODERATE COPD BY GOLD CLASSIFICATION (H): Primary | ICD-10-CM

## 2025-08-01 NOTE — ED PROVIDER NOTES
History   Chief Complaint:  Shortness of Breath       HPI   Thierry Samuel is a 86 year old male anticoagulated with coumadin for atrial fibrillation with history of COPD who presents with shortness of breath. Per the patient, several days ago he developed increased shortness of breath with a cough. O2 sats at home were 68% today, and her daughter placed him on home O2, and his sats increased to 94%. No chest pain. No fevers/chills. Pt does report a productive cough. He was recently started on montelukast.     Review of Systems   Constitutional: Negative for chills and fever.   Respiratory: Positive for cough and shortness of breath.    Cardiovascular: Negative for chest pain.   All other systems reviewed and are negative.        Allergies:  The patient has no known allergies.     Medications:  Albuterol  Lipitor  Lasix  Losartan  Cozaar  Prilosec  Xifaxan  Spiriva  Coumadin    Past Medical History:    Hypertension  Obstructive sleep apnea  Atrial fibrillation  COPD  CHF    Past Surgical History:    Appendectomy  Cardiac surgery    Social History:  The patient was accompanied to the ER by his daughter  The patient is from [facility]     Physical Exam     Patient Vitals for the past 24 hrs:   BP Temp Temp src Pulse Resp SpO2   08/10/21 1750 -- -- -- -- -- 93 %   08/10/21 1745 119/68 -- -- 76 -- --   08/10/21 1730 -- -- -- -- -- 99 %   08/10/21 1706 -- -- -- -- 24 --   08/10/21 1705 120/53 98.6  F (37  C) Oral 68 20 97 %       Physical Exam    Nursing note and vitals reviewed.  HENT:   Mouth/Throat: Moist mucous membranes.   Eyes: EOMI, nonicteric sclera  Cardiovascular: Normal rate, regular rhythm, no murmurs, rubs, or gallops  Pulmonary/Chest: Mildly increased effort with both crackles and wheezes auscultated. Pt able to speak normally.   Abdominal: Soft. Nontender, nondistended, no guarding or rigidity.   Musculoskeletal: Normal range of motion.   Neurological: Alert. Moves all extremities spontaneously.  DARWIN CARDONA FROM PT W/INTEREST TO RESCHED APPT CX 08/11 SINCE PROVIDER WILL BE OUT OF OFFICE. RTN'D PC TO PT & LMOM TO ADV DARWIN CARDONA & TO CALL BACK IF STILL INTERESTED IN RESCHED     Skin: Skin is warm and dry. No rash noted.   Psychiatric: Normal mood and affect.       Emergency Department Course   ECG  ECG taken at 1739, ECG read at 1745  Atrial fibrillation  Left axis deviation  Right bundle branch block  Inferior infarct, age undetermined  Abnormal ECG   Rate 67 bpm. NH interval * ms. QRS duration 130 ms. QT/QTc 410/433 ms. P-R-T axes * -55 -15.     Imaging:      XR Chest Port 1 View  Right-sided single lead cardiac implantable device redemonstrated. Cardiac silhouette remains enlarged with diffuse interstitial thickening and some patchy opacities of the bases. These findings suggest volume overload/chronic CHF. Central   pulmonary vasculature is prominent. No significant effusions. Osseous structures are similar.  Imaging independently reviewed and agree with radiologist interpretation.         Laboratory:    CBC: WBC 13.5 (H), HGB 13.1 (L),   CMP: Calcium 8.4 (L), Glucose 134 (H), protein total 6.6 (L) o/w WNL (Creatinine 0.85)   Lactic acid whole blood (result time 1746) 1.4   SARS-CoV2 (COVID19) Virus PCR Multiplex: Negative   BNP: 1063  Troponin (Collected 1733): <0.015  INR: 2.91 (H)  blood gas venous: pH: 7.38, PCO2: 51 (H), PO2: 23 (L), Bicarbonate: 30 (H), Oxyhgb: 38 (L), FIO2 2, base excess 3.6 (H)       Emergency Department Course:    Reviewed:  I reviewed nursing notes, vitals and past medical history    Assessments:  1810 I obtained history and examined the patient as noted above.    I rechecked the patient and explained findings.     Consults:   1842 I spoke with Dr. Renard Diaz of the hospitalist service from [facility] regarding patient's presentation, findings, and plan of care.    Interventions:  1818: Duoneb, 6 mL, Nebulization  1819: Solu-Medrol, 125 mg, IV  1819: Lasix, 60 mg, IV  2g rocephin  100mg doxy    Disposition:  The patient was admitted to the hospital under the care of Dr. Renard Diaz.       Impression & Plan       Medical Decision Making:  Patient  presents with several day history of worsening shortness of breath in the context of cough and increased beating production.  He has history of COPD and needed his home oxygen today when he was visibly pale and home pulse oximeter was reading in the 60s.  On exam, he has wheezes and crackles.  We will treat him for CHF exacerbation with Lasix, COPD exacerbation with nebulizers and Solu-Medrol, and he also has leukocytosis with increase sputum production therefore we will also start pneumonia coverage.  Doxycycline was chosen given it has the least amount of effect on INR for atypical coverage.  Rocephin given as well.  Admission indicated for further evaluation and treatment.  He and his daughter and agree with the plan.  Dr. Diaz from our hospitalist service accepts patient for admission.    Covid-19  Thierry Samuel was evaluated during a global COVID-19 pandemic, which necessitated consideration that the patient might be at risk for infection with the SARS-CoV-2 virus that causes COVID-19.   Applicable protocols for evaluation were followed during the patient's care.   COVID-19 was considered as part of the patient's evaluation. The plan for testing is:  a test was obtained during this visit.    Diagnosis:    ICD-10-CM    1. COPD exacerbation (H)  J44.1    2. Acute on chronic congestive heart failure, unspecified heart failure type (H)  I50.9    3. Acute on chronic respiratory failure with hypoxia (H)  J96.21        Scribe Disclosure:  I, Ric Burt () and Sebastián Azar (trainee), am serving as a scribe at 5:32 PM on 8/10/2021 to document services personally performed by Jesus Pinedo MD based on my observations and the provider's statements to me.             Jesus Pinedo MD  08/11/21 0041